# Patient Record
Sex: FEMALE | Race: BLACK OR AFRICAN AMERICAN | Employment: UNEMPLOYED | URBAN - METROPOLITAN AREA
[De-identification: names, ages, dates, MRNs, and addresses within clinical notes are randomized per-mention and may not be internally consistent; named-entity substitution may affect disease eponyms.]

---

## 2018-10-05 ENCOUNTER — HOSPITAL ENCOUNTER (OUTPATIENT)
Age: 33
Setting detail: SPECIMEN
Discharge: HOME | End: 2018-10-05
Payer: COMMERCIAL

## 2018-10-05 DIAGNOSIS — Z11.4 ENCOUNTER FOR SCREENING FOR HIV: ICD-10-CM

## 2018-10-05 DIAGNOSIS — Z13.220 SCREENING CHOLESTEROL LEVEL: ICD-10-CM

## 2018-10-05 DIAGNOSIS — E66.01 CLASS 3 SEVERE OBESITY DUE TO EXCESS CALORIES WITHOUT SERIOUS COMORBIDITY WITH BODY MASS INDEX (BMI) OF 60.0 TO 69.9 IN ADULT (HCC): ICD-10-CM

## 2018-10-05 DIAGNOSIS — Z13.1 ENCOUNTER FOR SCREENING FOR DIABETES MELLITUS: ICD-10-CM

## 2018-10-05 DIAGNOSIS — Z83.3 FAMILY HISTORY OF DIABETES MELLITUS TYPE II: ICD-10-CM

## 2018-10-05 LAB
ABSOLUTE EOS #: <0.03 K/UL (ref 0–0.44)
ABSOLUTE IMMATURE GRANULOCYTE: <0.03 K/UL (ref 0–0.3)
ABSOLUTE LYMPH #: 2.59 K/UL (ref 1.1–3.7)
ABSOLUTE MONO #: 0.8 K/UL (ref 0.1–1.2)
ALBUMIN SERPL-MCNC: 3.8 G/DL (ref 3.5–5.2)
ALBUMIN/GLOBULIN RATIO: 1.1 (ref 1–2.5)
ALP BLD-CCNC: 64 U/L (ref 35–104)
ALT SERPL-CCNC: 14 U/L (ref 5–33)
ANION GAP SERPL CALCULATED.3IONS-SCNC: 11 MMOL/L (ref 9–17)
AST SERPL-CCNC: 11 U/L
BASOPHILS # BLD: 0 % (ref 0–2)
BASOPHILS ABSOLUTE: 0.04 K/UL (ref 0–0.2)
BILIRUB SERPL-MCNC: 0.22 MG/DL (ref 0.3–1.2)
BUN BLDV-MCNC: 7 MG/DL (ref 6–20)
BUN/CREAT BLD: ABNORMAL (ref 9–20)
CALCIUM SERPL-MCNC: 9 MG/DL (ref 8.6–10.4)
CHLORIDE BLD-SCNC: 97 MMOL/L (ref 98–107)
CHOLESTEROL/HDL RATIO: 3.6
CHOLESTEROL: 205 MG/DL
CO2: 27 MMOL/L (ref 20–31)
CREAT SERPL-MCNC: 0.64 MG/DL (ref 0.5–0.9)
DIFFERENTIAL TYPE: ABNORMAL
EOSINOPHILS RELATIVE PERCENT: 0 % (ref 1–4)
ESTIMATED AVERAGE GLUCOSE: 123 MG/DL
GFR AFRICAN AMERICAN: >60 ML/MIN
GFR NON-AFRICAN AMERICAN: >60 ML/MIN
GFR SERPL CREATININE-BSD FRML MDRD: ABNORMAL ML/MIN/{1.73_M2}
GFR SERPL CREATININE-BSD FRML MDRD: ABNORMAL ML/MIN/{1.73_M2}
GLUCOSE BLD-MCNC: 98 MG/DL (ref 70–99)
HBA1C MFR BLD: 5.9 % (ref 4–6)
HCT VFR BLD CALC: 41.3 % (ref 36.3–47.1)
HDLC SERPL-MCNC: 57 MG/DL
HEMOGLOBIN: 12.8 G/DL (ref 11.9–15.1)
HIV AG/AB: NONREACTIVE
IMMATURE GRANULOCYTES: 0 %
LDL CHOLESTEROL: 134 MG/DL (ref 0–130)
LYMPHOCYTES # BLD: 29 % (ref 24–43)
MCH RBC QN AUTO: 26.5 PG (ref 25.2–33.5)
MCHC RBC AUTO-ENTMCNC: 31 G/DL (ref 28.4–34.8)
MCV RBC AUTO: 85.5 FL (ref 82.6–102.9)
MONOCYTES # BLD: 9 % (ref 3–12)
NRBC AUTOMATED: 0 PER 100 WBC
PDW BLD-RTO: 13.1 % (ref 11.8–14.4)
PLATELET # BLD: 366 K/UL (ref 138–453)
PLATELET ESTIMATE: ABNORMAL
PMV BLD AUTO: 11 FL (ref 8.1–13.5)
POTASSIUM SERPL-SCNC: 4.5 MMOL/L (ref 3.7–5.3)
RBC # BLD: 4.83 M/UL (ref 3.95–5.11)
RBC # BLD: ABNORMAL 10*6/UL
SEG NEUTROPHILS: 62 % (ref 36–65)
SEGMENTED NEUTROPHILS ABSOLUTE COUNT: 5.54 K/UL (ref 1.5–8.1)
SODIUM BLD-SCNC: 135 MMOL/L (ref 135–144)
TOTAL PROTEIN: 7.3 G/DL (ref 6.4–8.3)
TRIGL SERPL-MCNC: 69 MG/DL
VLDLC SERPL CALC-MCNC: ABNORMAL MG/DL (ref 1–30)
WBC # BLD: 9 K/UL (ref 3.5–11.3)
WBC # BLD: ABNORMAL 10*3/UL

## 2018-11-20 ENCOUNTER — HOSPITAL ENCOUNTER (OUTPATIENT)
Facility: CLINIC | Age: 33
Discharge: HOME | End: 2018-11-22
Payer: MEDICARE

## 2018-11-20 ENCOUNTER — HOSPITAL ENCOUNTER (OUTPATIENT)
Dept: GENERAL RADIOLOGY | Facility: CLINIC | Age: 33
Discharge: HOME | End: 2018-11-22
Payer: MEDICARE

## 2018-11-20 DIAGNOSIS — R07.1 CHEST PAIN ON BREATHING: ICD-10-CM

## 2018-11-20 DIAGNOSIS — J15.0: ICD-10-CM

## 2018-11-20 PROCEDURE — 71046 X-RAY EXAM CHEST 2 VIEWS: CPT

## 2019-02-06 PROBLEM — E66.01 CLASS 3 SEVERE OBESITY DUE TO EXCESS CALORIES WITHOUT SERIOUS COMORBIDITY WITH BODY MASS INDEX (BMI) OF 60.0 TO 69.9 IN ADULT (HCC): Status: ACTIVE | Noted: 2019-02-06

## 2019-02-06 PROBLEM — E66.813 CLASS 3 SEVERE OBESITY DUE TO EXCESS CALORIES WITHOUT SERIOUS COMORBIDITY WITH BODY MASS INDEX (BMI) OF 60.0 TO 69.9 IN ADULT: Status: ACTIVE | Noted: 2019-02-06

## 2019-02-06 PROBLEM — I10 ESSENTIAL HYPERTENSION: Status: ACTIVE | Noted: 2019-02-06

## 2019-04-15 ENCOUNTER — HOSPITAL ENCOUNTER (OUTPATIENT)
Age: 34
Setting detail: SPECIMEN
Discharge: HOME | End: 2019-04-15
Payer: MEDICARE

## 2019-04-15 ENCOUNTER — OFFICE VISIT (OUTPATIENT)
Dept: OBGYN CLINIC | Age: 34
End: 2019-04-15
Payer: MEDICARE

## 2019-04-15 VITALS
HEART RATE: 91 BPM | BODY MASS INDEX: 51.91 KG/M2 | SYSTOLIC BLOOD PRESSURE: 158 MMHG | DIASTOLIC BLOOD PRESSURE: 106 MMHG | WEIGHT: 293 LBS | HEIGHT: 63 IN

## 2019-04-15 DIAGNOSIS — Z01.419 WOMEN'S ANNUAL ROUTINE GYNECOLOGICAL EXAMINATION: Primary | ICD-10-CM

## 2019-04-15 DIAGNOSIS — E28.9 OVARIAN DYSFUNCTION: ICD-10-CM

## 2019-04-15 PROCEDURE — 99385 PREV VISIT NEW AGE 18-39: CPT | Performed by: OBSTETRICS & GYNECOLOGY

## 2019-04-15 ASSESSMENT — ENCOUNTER SYMPTOMS
COUGH: 0
ABDOMINAL PAIN: 0
SHORTNESS OF BREATH: 0
BACK PAIN: 0

## 2019-04-15 NOTE — PROGRESS NOTES
Tampons/Pads in a day: 4  Last Pap: 3       Any history of abnormal paps no    PriorColpo/Biopsy n/a     Last Mammogram n/a  Contraception: none  Complications: none  STDs: history of chlamydia during pregnancy in 2009  Psychosocial History: Occupation:   Nursing school and nurse aid at [address]   Caffeine Yes, 1 cup coffee    At risk for depression No    Abuse:   No  Seatbelt:   Yes  Exercise:  Rare    Social History     Socioeconomic History    Marital status: Single     Spouse name: Not on file    Number of children: Not on file    Years of education: Not on file    Highest education level: Not on file   Occupational History    Not on file   Social Needs    Financial resource strain: Not on file    Food insecurity:     Worry: Not on file     Inability: Not on file    Transportation needs:     Medical: Not on file     Non-medical: Not on file   Tobacco Use    Smoking status: Never Smoker    Smokeless tobacco: Never Used   Substance and Sexual Activity    Alcohol use: No    Drug use: No    Sexual activity: Not Currently   Lifestyle    Physical activity:     Days per week: Not on file     Minutes per session: Not on file    Stress: Not on file   Relationships    Social connections:     Talks on phone: Not on file     Gets together: Not on file     Attends Latter day service: Not on file     Active member of club or organization: Not on file     Attends meetings of clubs or organizations: Not on file     Relationship status: Not on file    Intimate partner violence:     Fear of current or ex partner: Not on file     Emotionally abused: Not on file     Physically abused: Not on file     Forced sexual activity: Not on file   Other Topics Concern    Not on file   Social History Narrative    ** Merged History Encounter **            Family History   Problem Relation Age of Onset    Heart Disease Mother     Other Mother     Heart Disease Father     Other Father     Depression Mother     Diabetes F. R.O.M. Neurologic Exam: Grossly intact without noted sensorimotor deficits and oriented x 3. Assessment/Plan:   Unremarkable annual Gyn exam.    Cervical Cytology Evaluation begins at 25years old. If Negative Cytology, Follow-up screening per current guidelines. Infertility - labs and u/s ordered. Semen analysis slip given for boyfriend to have done. If all normal will have pt do AMH and possibly HSG. Mammograms every 1year. If 38 yo and last mammogram was negative. Birth control and barrier recommendations discussed. STD counseling and prevention reviewed. Routine health maintenance per patients PCP.   Pt to follow up for annual exam in 1 year     Ab Walkre MD  [address]

## 2019-04-16 LAB
HPV SAMPLE: ABNORMAL
HPV, GENOTYPE 16: NOT DETECTED
HPV, GENOTYPE 18: NOT DETECTED
HPV, HIGH RISK OTHER: DETECTED
HPV, INTERPRETATION: ABNORMAL
SPECIMEN DESCRIPTION: ABNORMAL

## 2019-04-19 ENCOUNTER — HOSPITAL ENCOUNTER (OUTPATIENT)
Age: 34
Discharge: HOME | End: 2019-04-19
Payer: MEDICARE

## 2019-04-19 ENCOUNTER — HOSPITAL ENCOUNTER (OUTPATIENT)
Dept: ULTRASOUND IMAGING | Age: 34
Discharge: HOME | End: 2019-04-21
Payer: MEDICARE

## 2019-04-19 DIAGNOSIS — E28.9 OVARIAN DYSFUNCTION: ICD-10-CM

## 2019-04-19 LAB
HCT VFR BLD CALC: 37.7 % (ref 36–46)
HEMOGLOBIN: 12.4 G/DL (ref 12–16)
MCH RBC QN AUTO: 27 PG (ref 26–34)
MCHC RBC AUTO-ENTMCNC: 32.9 G/DL (ref 31–37)
MCV RBC AUTO: 82 FL (ref 80–100)
NRBC AUTOMATED: NORMAL PER 100 WBC
PDW BLD-RTO: 13.6 % (ref 11.5–14.9)
PLATELET # BLD: 373 K/UL (ref 150–450)
PMV BLD AUTO: 8.7 FL (ref 6–12)
PROGESTERONE LEVEL: 2.85 NG/ML
RBC # BLD: 4.59 M/UL (ref 4–5.2)
TSH SERPL DL<=0.05 MIU/L-ACNC: 2.3 MIU/L (ref 0.3–5)
WBC # BLD: 9.6 K/UL (ref 3.5–11)

## 2019-04-19 PROCEDURE — 85027 COMPLETE CBC AUTOMATED: CPT

## 2019-04-19 PROCEDURE — 84144 ASSAY OF PROGESTERONE: CPT

## 2019-04-19 PROCEDURE — 36415 COLL VENOUS BLD VENIPUNCTURE: CPT

## 2019-04-19 PROCEDURE — 84443 ASSAY THYROID STIM HORMONE: CPT

## 2019-04-19 PROCEDURE — 76856 US EXAM PELVIC COMPLETE: CPT

## 2019-04-22 ENCOUNTER — OFFICE VISIT (OUTPATIENT)
Dept: FAMILY MEDICINE CLINIC | Age: 34
End: 2019-04-22
Payer: MEDICARE

## 2019-04-22 VITALS
SYSTOLIC BLOOD PRESSURE: 167 MMHG | DIASTOLIC BLOOD PRESSURE: 102 MMHG | WEIGHT: 293 LBS | HEART RATE: 91 BPM | HEIGHT: 63 IN | RESPIRATION RATE: 16 BRPM | OXYGEN SATURATION: 98 % | BODY MASS INDEX: 51.91 KG/M2

## 2019-04-22 DIAGNOSIS — I10 ESSENTIAL HYPERTENSION: Primary | ICD-10-CM

## 2019-04-22 DIAGNOSIS — E66.01 CLASS 3 SEVERE OBESITY DUE TO EXCESS CALORIES WITHOUT SERIOUS COMORBIDITY WITH BODY MASS INDEX (BMI) OF 60.0 TO 69.9 IN ADULT (HCC): ICD-10-CM

## 2019-04-22 DIAGNOSIS — B37.9 CANDIDIASIS: ICD-10-CM

## 2019-04-22 PROCEDURE — G8417 CALC BMI ABV UP PARAM F/U: HCPCS | Performed by: FAMILY MEDICINE

## 2019-04-22 PROCEDURE — G8427 DOCREV CUR MEDS BY ELIG CLIN: HCPCS | Performed by: FAMILY MEDICINE

## 2019-04-22 PROCEDURE — 1036F TOBACCO NON-USER: CPT | Performed by: FAMILY MEDICINE

## 2019-04-22 PROCEDURE — 99204 OFFICE O/P NEW MOD 45 MIN: CPT | Performed by: FAMILY MEDICINE

## 2019-04-22 RX ORDER — PHENTERMINE HYDROCHLORIDE 37.5 MG/1
37.5 TABLET ORAL
Qty: 30 TABLET | Refills: 0 | Status: SHIPPED | OUTPATIENT
Start: 2019-04-22 | End: 2019-05-22 | Stop reason: SDUPTHER

## 2019-04-22 RX ORDER — NYSTATIN 100000 U/G
CREAM TOPICAL
Qty: 1 TUBE | Refills: 2 | Status: SHIPPED | OUTPATIENT
Start: 2019-04-22 | End: 2020-10-20

## 2019-04-22 RX ORDER — METOPROLOL SUCCINATE 100 MG/1
100 TABLET, EXTENDED RELEASE ORAL DAILY
Qty: 30 TABLET | Refills: 3 | Status: SHIPPED | OUTPATIENT
Start: 2019-04-22 | End: 2019-08-21 | Stop reason: SDUPTHER

## 2019-04-22 ASSESSMENT — PATIENT HEALTH QUESTIONNAIRE - PHQ9
SUM OF ALL RESPONSES TO PHQ QUESTIONS 1-9: 0
SUM OF ALL RESPONSES TO PHQ9 QUESTIONS 1 & 2: 0
2. FEELING DOWN, DEPRESSED OR HOPELESS: 0
1. LITTLE INTEREST OR PLEASURE IN DOING THINGS: 0
SUM OF ALL RESPONSES TO PHQ QUESTIONS 1-9: 0

## 2019-04-22 NOTE — PROGRESS NOTES
1 tablet by mouth daily 30 tablet 5    albuterol sulfate HFA (PROVENTIL HFA) 108 (90 Base) MCG/ACT inhaler Inhale 2 puffs into the lungs every 6 hours as needed for Wheezing 1 Inhaler 3    clotrimazole-betamethasone (LOTRISONE) 1-0.05 % cream Apply topically 2 times daily. 45 g 0     No current facility-administered medications for this visit. ALLERGIES:    Allergies   Allergen Reactions    Vancomycin Itching       Social History     Tobacco Use    Smoking status: Never Smoker    Smokeless tobacco: Never Used   Substance Use Topics    Alcohol use: No        LDL Cholesterol (mg/dL)   Date Value   10/05/2018 134 (H)     HDL (mg/dL)   Date Value   10/05/2018 57     BUN (mg/dL)   Date Value   10/05/2018 7     CREATININE (mg/dL)   Date Value   10/05/2018 0.64     Glucose (mg/dL)   Date Value   10/05/2018 98     Hemoglobin A1C (%)   Date Value   10/05/2018 5.9              Subjective:      HPI  Here today as a new patient to establish care she's had an ongoing history of hypertension which looks like it's not been very well controlled she is faithful about taking her medications daily  She had recent laboratory studies done which ESSENTIALLY normal other than she's had some elevated cholesterol  She also would like to do something to help with weight loss and wonders if there is any medications that will help with appetite    Is a rash underneath her abdominal fold    Review of Systems:     Constitutional: Negative for fever, appetite change and fatigue. Family social and medical history reviewed and unchanged     HENT: Negative. Negative for nosebleeds, trouble swallowing and neck pain. Eyes: Negative for photophobia and visual disturbance. Respiratory: Negative. Negative for chest tightness and shortness of breath. Cardiovascular: Negative. Negative for chest pain and leg swelling. Gastrointestinal: Negative. Negative for abdominal pain and blood in stool.    Endocrine: Negative for cold intolerance and polyuria. Genitourinary: Negative for dysuria and hematuria. Musculoskeletal: Negative. Skin: Negative for rash. Allergic/Immunologic: Negative. Neurological: Negative. Negative for dizziness, weakness and numbness. Hematological: Negative. Negative for adenopathy. Does not bruise/bleed easily. Psychiatric/Behavioral: Negative for sleep disturbance, dysphoric mood and  decreased concentration. The patient is not nervous/anxious. Objective:     Physical Exam:     Nursing note and vitals reviewed. BP (!) 167/102   Pulse 91   Resp 16   Ht 5' 3\" (1.6 m)   Wt (!) 350 lb (158.8 kg)   LMP 03/26/2019 (Approximate)   SpO2 98%   BMI 62.00 kg/m²   Constitutional: She is oriented to person, place, and time. She   appears well-developed and well-nourished. HENT:   Head: Normocephalic and atraumatic. Right Ear: External ear normal. Tympanic membrane is not erythematous. No middle ear effusion. Left Ear: External ear normal. Tympanic membrane is not erythematous. No middle ear effusion. Nose: No mucosal edema. Mouth/Throat: Oropharynx is clear and moist. No posterior oropharyngeal erythema. Eyes: Conjunctivae and EOM are normal. Pupils are equal, round, and reactive to light. Neck: Normal range of motion. Neck supple. No thyromegaly present. Cardiovascular: Normal rate, regular rhythm and normal heart sounds. No murmur heard. Pulmonary/Chest: Effort normal and breath sounds normal. She has no wheezes. Shehas no rales. Abdominal: Soft. Bowel sounds are normal. She exhibits no distension and no mass. There is no tenderness. There is no rebound and no guarding. erythema under her abdominal apron Genitourinary/Anorectal:deferred  Musculoskeletal: Normal range of motion. She exhibits no edema or tenderness. Lymphadenopathy: She has no cervical adenopathy. Neurological: She is alert and oriented to person, place, and time. She has normal reflexes.    Skin: Skin is warm and dry. No rash noted. Psychiatric: She has a normal mood and affect. Her   behavior is normal.       Assessment:      1. Essential hypertension    2. Class 3 severe obesity due to excess calories without serious comorbidity with body mass index (BMI) of 60.0 to 69.9 in [facility])    3. Candidiasis          Plan:      Call or return to clinic prn if these symptoms worsen or fail to improve as anticipated. I have reviewed the instructions with the patient, answering all questions to her satisfaction. No follow-ups on file. No orders of the defined types were placed in this encounter. Orders Placed This Encounter   Medications    metoprolol succinate (TOPROL XL) 100 MG extended release tablet     Sig: Take 1 tablet by mouth daily     Dispense:  30 tablet     Refill:  3    phentermine (ADIPEX-P) 37.5 MG tablet     Sig: Take 1 tablet by mouth every morning (before breakfast) for 30 days. Dispense:  30 tablet     Refill:  0    nystatin (MYCOSTATIN) 993802 UNIT/GM cream     Sig: Apply topically 2 times daily.      Dispense:  1 Tube     Refill:  2   Discussed current medications stop amlodipine follow-up in the office in one month Electronically signed by Alysia Chavez DO on 4/22/2019 at 3:18 PM

## 2019-04-24 LAB — CYTOLOGY REPORT: NORMAL

## 2019-05-22 ENCOUNTER — OFFICE VISIT (OUTPATIENT)
Dept: FAMILY MEDICINE CLINIC | Age: 34
End: 2019-05-22
Payer: MEDICARE

## 2019-05-22 VITALS
HEIGHT: 63 IN | SYSTOLIC BLOOD PRESSURE: 125 MMHG | OXYGEN SATURATION: 99 % | DIASTOLIC BLOOD PRESSURE: 67 MMHG | HEART RATE: 74 BPM | BODY MASS INDEX: 51.91 KG/M2 | WEIGHT: 293 LBS

## 2019-05-22 DIAGNOSIS — E66.01 CLASS 3 SEVERE OBESITY DUE TO EXCESS CALORIES WITHOUT SERIOUS COMORBIDITY WITH BODY MASS INDEX (BMI) OF 60.0 TO 69.9 IN ADULT (HCC): ICD-10-CM

## 2019-05-22 PROCEDURE — 1036F TOBACCO NON-USER: CPT | Performed by: FAMILY MEDICINE

## 2019-05-22 PROCEDURE — G8417 CALC BMI ABV UP PARAM F/U: HCPCS | Performed by: FAMILY MEDICINE

## 2019-05-22 PROCEDURE — 99213 OFFICE O/P EST LOW 20 MIN: CPT | Performed by: FAMILY MEDICINE

## 2019-05-22 PROCEDURE — G8427 DOCREV CUR MEDS BY ELIG CLIN: HCPCS | Performed by: FAMILY MEDICINE

## 2019-05-22 RX ORDER — PHENTERMINE HYDROCHLORIDE 37.5 MG/1
37.5 TABLET ORAL
Qty: 30 TABLET | Refills: 0 | Status: SHIPPED | OUTPATIENT
Start: 2019-05-22 | End: 2019-06-19 | Stop reason: SDUPTHER

## 2019-05-24 ENCOUNTER — HOSPITAL ENCOUNTER (OUTPATIENT)
Age: 34
Setting detail: SPECIMEN
Discharge: HOME | End: 2019-05-24
Payer: MEDICARE

## 2019-05-24 ENCOUNTER — PROCEDURE VISIT (OUTPATIENT)
Dept: OBGYN CLINIC | Age: 34
End: 2019-05-24
Payer: MEDICARE

## 2019-05-24 VITALS
HEIGHT: 63 IN | WEIGHT: 293 LBS | HEART RATE: 88 BPM | DIASTOLIC BLOOD PRESSURE: 88 MMHG | SYSTOLIC BLOOD PRESSURE: 138 MMHG | BODY MASS INDEX: 51.91 KG/M2

## 2019-05-24 DIAGNOSIS — R87.810 CERVICAL HIGH RISK HPV (HUMAN PAPILLOMAVIRUS) TEST POSITIVE: ICD-10-CM

## 2019-05-24 DIAGNOSIS — R87.612 LGSIL ON PAP SMEAR OF CERVIX: Primary | ICD-10-CM

## 2019-05-24 PROCEDURE — 57454 BX/CURETT OF CERVIX W/SCOPE: CPT | Performed by: OBSTETRICS & GYNECOLOGY

## 2019-05-29 LAB — SURGICAL PATHOLOGY REPORT: NORMAL

## 2019-06-19 ENCOUNTER — OFFICE VISIT (OUTPATIENT)
Dept: FAMILY MEDICINE CLINIC | Age: 34
End: 2019-06-19
Payer: MEDICARE

## 2019-06-19 VITALS
BODY MASS INDEX: 51.91 KG/M2 | HEART RATE: 79 BPM | HEIGHT: 63 IN | SYSTOLIC BLOOD PRESSURE: 114 MMHG | WEIGHT: 293 LBS | DIASTOLIC BLOOD PRESSURE: 78 MMHG

## 2019-06-19 DIAGNOSIS — E66.01 CLASS 3 SEVERE OBESITY DUE TO EXCESS CALORIES WITHOUT SERIOUS COMORBIDITY WITH BODY MASS INDEX (BMI) OF 60.0 TO 69.9 IN ADULT (HCC): ICD-10-CM

## 2019-06-19 PROCEDURE — G8417 CALC BMI ABV UP PARAM F/U: HCPCS | Performed by: FAMILY MEDICINE

## 2019-06-19 PROCEDURE — 1036F TOBACCO NON-USER: CPT | Performed by: FAMILY MEDICINE

## 2019-06-19 PROCEDURE — G8427 DOCREV CUR MEDS BY ELIG CLIN: HCPCS | Performed by: FAMILY MEDICINE

## 2019-06-19 PROCEDURE — 99213 OFFICE O/P EST LOW 20 MIN: CPT | Performed by: FAMILY MEDICINE

## 2019-06-19 RX ORDER — PHENTERMINE HYDROCHLORIDE 37.5 MG/1
37.5 TABLET ORAL
Qty: 30 TABLET | Refills: 0 | Status: SHIPPED | OUTPATIENT
Start: 2019-06-19 | End: 2019-06-21 | Stop reason: SDUPTHER

## 2019-06-19 ASSESSMENT — PATIENT HEALTH QUESTIONNAIRE - PHQ9
SUM OF ALL RESPONSES TO PHQ QUESTIONS 1-9: 0
1. LITTLE INTEREST OR PLEASURE IN DOING THINGS: 0
SUM OF ALL RESPONSES TO PHQ QUESTIONS 1-9: 0
SUM OF ALL RESPONSES TO PHQ9 QUESTIONS 1 & 2: 0
2. FEELING DOWN, DEPRESSED OR HOPELESS: 0

## 2019-06-19 NOTE — PROGRESS NOTES
tablet 5    albuterol sulfate HFA (PROVENTIL HFA) 108 (90 Base) MCG/ACT inhaler Inhale 2 puffs into the lungs every 6 hours as needed for Wheezing 1 Inhaler 3    clotrimazole-betamethasone (LOTRISONE) 1-0.05 % cream Apply topically 2 times daily. 45 g 0     No current facility-administered medications for this visit. ALLERGIES:    Allergies   Allergen Reactions    Vancomycin Itching       Social History     Tobacco Use    Smoking status: Never Smoker    Smokeless tobacco: Never Used   Substance Use Topics    Alcohol use: No        LDL Cholesterol (mg/dL)   Date Value   10/05/2018 134 (H)     HDL (mg/dL)   Date Value   10/05/2018 57     BUN (mg/dL)   Date Value   10/05/2018 7     CREATININE (mg/dL)   Date Value   10/05/2018 0.64     Glucose (mg/dL)   Date Value   10/05/2018 98     Hemoglobin A1C (%)   Date Value   10/05/2018 5.9              Subjective:      HPI  She is here today for weight check she is doing well with lost 4 pounds she is having no complaints or problems    Review of Systems:     Constitutional: Negative for fever, appetite change and fatigue. Family social and medical history reviewed and unchanged     HENT: Negative. Negative for nosebleeds, trouble swallowing and neck pain. Eyes: Negative for photophobia and visual disturbance. Respiratory: Negative. Negative for chest tightness and shortness of breath. Cardiovascular: Negative. Negative for chest pain and leg swelling. Gastrointestinal: Negative. Negative for abdominal pain and blood in stool. Endocrine: Negative for cold intolerance and polyuria. Genitourinary: Negative for dysuria and hematuria. Musculoskeletal: Negative. Skin: Negative for rash. Allergic/Immunologic: Negative. Neurological: Negative. Negative for dizziness, weakness and numbness. Hematological: Negative. Negative for adenopathy. Does not bruise/bleed easily.    Psychiatric/Behavioral: Negative for sleep disturbance, dysphoric mood and  decreased concentration. The patient is not nervous/anxious. Objective:     Physical Exam:     Nursing note and vitals reviewed. /78   Pulse 79   Ht 5' 2.99\" (1.6 m)   Wt (!) 330 lb (149.7 kg)   LMP 05/19/2019   BMI 58.47 kg/m²   Constitutional: She is oriented to person, place, and time. She   appears well-developed and well-nourished. HENT:   Head: Normocephalic and atraumatic. Right Ear: External ear normal. Tympanic membrane is not erythematous. No middle ear effusion. Left Ear: External ear normal. Tympanic membrane is not erythematous. No middle ear effusion. Nose: No mucosal edema. Mouth/Throat: Oropharynx is clear and moist. No posterior oropharyngeal erythema. Eyes: Conjunctivae and EOM are normal. Pupils are equal, round, and reactive to light. Neck: Normal range of motion. Neck supple. No thyromegaly present. Cardiovascular: Normal rate, regular rhythm and normal heart sounds. No murmur heard. Pulmonary/Chest: Effort normal and breath sounds normal. She has no wheezes. Shehas no rales. Abdominal: Soft. Bowel sounds are normal. She exhibits no distension and no mass. There is no tenderness. There is no rebound and no guarding. Genitourinary/Anorectal:deferred  Musculoskeletal: Normal range of motion. She exhibits no edema or tenderness. Lymphadenopathy: She has no cervical adenopathy. Neurological: She is alert and oriented to person, place, and time. She has normal reflexes. Skin: Skin is warm and dry. No rash noted. Psychiatric: She has a normal mood and affect. Her   behavior is normal.       Assessment:      1. Class 3 severe obesity due to excess calories without serious comorbidity with body mass index (BMI) of 60.0 to 69.9 in adult [facility])          Plan:      Call or return to clinic prn if these symptoms worsen or fail to improve as anticipated.   I have reviewed the instructions with the patient, answering all questions to her satisfaction. No follow-ups on file. No orders of the defined types were placed in this encounter. Orders Placed This Encounter   Medications    phentermine (ADIPEX-P) 37.5 MG tablet     Sig: Take 1 tablet by mouth every morning (before breakfast) for 30 days.      Dispense:  30 tablet     Refill:  0     Continue to diet and exercise and follow-up in the office in one month  Electronically signed by Ramu Briceño DO on 6/19/2019 at 1:09 PM

## 2019-06-21 ENCOUNTER — TELEPHONE (OUTPATIENT)
Dept: FAMILY MEDICINE CLINIC | Age: 34
End: 2019-06-21

## 2019-06-21 DIAGNOSIS — E66.01 CLASS 3 SEVERE OBESITY DUE TO EXCESS CALORIES WITHOUT SERIOUS COMORBIDITY WITH BODY MASS INDEX (BMI) OF 60.0 TO 69.9 IN ADULT (HCC): ICD-10-CM

## 2019-06-21 RX ORDER — PHENTERMINE HYDROCHLORIDE 37.5 MG/1
37.5 TABLET ORAL
Qty: 30 TABLET | Refills: 0 | Status: SHIPPED | OUTPATIENT
Start: 2019-06-21 | End: 2019-07-21

## 2019-06-21 NOTE — TELEPHONE ENCOUNTER
Pt requests her adipex script be sent to [facility] due to med being too expensive at [facility].

## 2019-07-17 ENCOUNTER — OFFICE VISIT (OUTPATIENT)
Dept: FAMILY MEDICINE CLINIC | Age: 34
End: 2019-07-17
Payer: MEDICARE

## 2019-07-17 VITALS
OXYGEN SATURATION: 100 % | WEIGHT: 293 LBS | RESPIRATION RATE: 18 BRPM | HEART RATE: 73 BPM | BODY MASS INDEX: 53.92 KG/M2 | HEIGHT: 62 IN | DIASTOLIC BLOOD PRESSURE: 99 MMHG | SYSTOLIC BLOOD PRESSURE: 145 MMHG

## 2019-07-17 DIAGNOSIS — E66.01 OBESITY, MORBID, BMI 50 OR HIGHER (HCC): Primary | ICD-10-CM

## 2019-07-17 PROCEDURE — G8417 CALC BMI ABV UP PARAM F/U: HCPCS | Performed by: FAMILY MEDICINE

## 2019-07-17 PROCEDURE — G8427 DOCREV CUR MEDS BY ELIG CLIN: HCPCS | Performed by: FAMILY MEDICINE

## 2019-07-17 PROCEDURE — 1036F TOBACCO NON-USER: CPT | Performed by: FAMILY MEDICINE

## 2019-07-17 PROCEDURE — 99213 OFFICE O/P EST LOW 20 MIN: CPT | Performed by: FAMILY MEDICINE

## 2019-07-17 ASSESSMENT — ENCOUNTER SYMPTOMS: SHORTNESS OF BREATH: 0

## 2019-07-17 NOTE — PROGRESS NOTES
Baljit Newsome MD  [address] MEDICINE  [id number] [address]  Dept: [phone]    Rosales Mayorga is a 36 y.o. female who presents today for hermedical conditions/complaints as noted below.   Rosales Mayorga is here today c/o 1 Month Follow-Up       HPI:     HPI    Here for weight check, on Adipex x 3 months  Denies tachycardia, headache, palpitations, tremors, insomnia  Has lost 5 lbs since last visit  Going through a lot of stress through school, sometimes emotionally overeats, cut out pop, walking more, doing portion control  Wt Readings from Last 3 Encounters:   19 (!) 325 lb 9.6 oz (147.7 kg)   19 (!) 330 lb (149.7 kg)   19 (!) 334 lb (151.5 kg)     BP Readings from Last 3 Encounters:   19 (!) 145/99   19 114/78   19 138/88       Patient Active Problem List   Diagnosis    Hyperlipidemia    Pre-diabetes    Class 3 severe obesity due to excess calories without serious comorbidity with body mass index (BMI) of 60.0 to 69.9 in [facility])    Essential hypertension       Past Medical History:   Diagnosis Date    Class 3 severe obesity due to excess calories without serious comorbidity with body mass index (BMI) of 60.0 to 69.9 in [facility]) 2019    Essential hypertension 2019    Hyperlipidemia 2016    Hyperlipidemia      Past Surgical History:   Procedure Laterality Date     SECTION       Family History   Problem Relation Age of Onset    Heart Disease Mother     Other Mother     Heart Disease Father     Other Father     Depression Mother     Diabetes Mother     High Blood Pressure Mother     Diabetes Maternal Grandmother     High Blood Pressure Maternal Grandmother     Kidney Disease Maternal Grandmother     Cancer Maternal Grandfather         prostate    Diabetes Maternal Grandfather     Heart Disease Maternal Grandfather     High Blood Pressure

## 2019-07-29 ENCOUNTER — HOSPITAL ENCOUNTER (OUTPATIENT)
Age: 34
Setting detail: SPECIMEN
Discharge: HOME | End: 2019-07-29
Payer: MEDICARE

## 2019-07-29 ENCOUNTER — OFFICE VISIT (OUTPATIENT)
Dept: OBGYN CLINIC | Age: 34
End: 2019-07-29
Payer: MEDICARE

## 2019-07-29 VITALS
HEART RATE: 80 BPM | BODY MASS INDEX: 51.91 KG/M2 | HEIGHT: 63 IN | SYSTOLIC BLOOD PRESSURE: 130 MMHG | WEIGHT: 293 LBS | DIASTOLIC BLOOD PRESSURE: 76 MMHG

## 2019-07-29 DIAGNOSIS — Z20.2 POSSIBLE EXPOSURE TO STD: ICD-10-CM

## 2019-07-29 DIAGNOSIS — N92.0 MENORRHAGIA WITH REGULAR CYCLE: Primary | ICD-10-CM

## 2019-07-29 PROCEDURE — 99213 OFFICE O/P EST LOW 20 MIN: CPT | Performed by: OBSTETRICS & GYNECOLOGY

## 2019-07-29 PROCEDURE — G8417 CALC BMI ABV UP PARAM F/U: HCPCS | Performed by: OBSTETRICS & GYNECOLOGY

## 2019-07-29 PROCEDURE — 1036F TOBACCO NON-USER: CPT | Performed by: OBSTETRICS & GYNECOLOGY

## 2019-07-29 PROCEDURE — G8427 DOCREV CUR MEDS BY ELIG CLIN: HCPCS | Performed by: OBSTETRICS & GYNECOLOGY

## 2019-07-29 ASSESSMENT — ENCOUNTER SYMPTOMS
BACK PAIN: 0
ABDOMINAL PAIN: 0
COUGH: 0
SHORTNESS OF BREATH: 0

## 2019-07-29 NOTE — PROGRESS NOTES
1 tablet by mouth daily 30 tablet 3    nystatin (MYCOSTATIN) 828202 UNIT/GM cream Apply topically 2 times daily. 1 Tube 2    albuterol sulfate HFA (PROVENTIL HFA) 108 (90 Base) MCG/ACT inhaler Inhale 2 puffs into the lungs every 6 hours as needed for Wheezing 1 Inhaler 3    clotrimazole-betamethasone (LOTRISONE) 1-0.05 % cream Apply topically 2 times daily. 45 g 0     No current facility-administered medications for this visit.       Social History     Socioeconomic History    Marital status: Single     Spouse name: Not on file    Number of children: 1    Years of education: Not on file    Highest education level: Not on file   Occupational History    Not on file   Social Needs    Financial resource strain: Not on file    Food insecurity:     Worry: Not on file     Inability: Not on file    Transportation needs:     Medical: Not on file     Non-medical: Not on file   Tobacco Use    Smoking status: Never Smoker    Smokeless tobacco: Never Used   Substance and Sexual Activity    Alcohol use: No    Drug use: No    Sexual activity: Not Currently   Lifestyle    Physical activity:     Days per week: Not on file     Minutes per session: Not on file    Stress: Not on file   Relationships    Social connections:     Talks on phone: Not on file     Gets together: Not on file     Attends Cheondoism service: Not on file     Active member of club or organization: Not on file     Attends meetings of clubs or organizations: Not on file     Relationship status: Not on file    Intimate partner violence:     Fear of current or ex partner: Not on file     Emotionally abused: Not on file     Physically abused: Not on file     Forced sexual activity: Not on file   Other Topics Concern    Not on file   Social History Narrative    ** Merged History Encounter **          Family History   Problem Relation Age of Onset    Heart Disease Mother     Other Mother     Heart Disease Father     Other Father     Depression

## 2019-07-30 LAB
C. TRACHOMATIS DNA ,URINE: NEGATIVE
N. GONORRHOEAE DNA, URINE: NEGATIVE
SOURCE: ABNORMAL
SPECIMEN DESCRIPTION: NORMAL
TRICHOMONAS VAGINALI, MOLECULAR: POSITIVE

## 2019-07-31 ENCOUNTER — TELEPHONE (OUTPATIENT)
Dept: OBGYN CLINIC | Age: 34
End: 2019-07-31

## 2019-07-31 RX ORDER — METRONIDAZOLE 500 MG/1
2000 TABLET ORAL ONCE
Qty: 4 TABLET | Refills: 0 | Status: SHIPPED | OUTPATIENT
Start: 2019-07-31 | End: 2019-07-31

## 2019-08-21 ENCOUNTER — OFFICE VISIT (OUTPATIENT)
Dept: FAMILY MEDICINE CLINIC | Age: 34
End: 2019-08-21
Payer: MEDICARE

## 2019-08-21 VITALS
SYSTOLIC BLOOD PRESSURE: 146 MMHG | OXYGEN SATURATION: 100 % | HEART RATE: 64 BPM | WEIGHT: 293 LBS | DIASTOLIC BLOOD PRESSURE: 99 MMHG | BODY MASS INDEX: 51.91 KG/M2 | HEIGHT: 63 IN

## 2019-08-21 DIAGNOSIS — I10 ESSENTIAL HYPERTENSION: ICD-10-CM

## 2019-08-21 DIAGNOSIS — E66.01 CLASS 3 SEVERE OBESITY DUE TO EXCESS CALORIES WITHOUT SERIOUS COMORBIDITY WITH BODY MASS INDEX (BMI) OF 60.0 TO 69.9 IN ADULT (HCC): Primary | ICD-10-CM

## 2019-08-21 PROCEDURE — G8417 CALC BMI ABV UP PARAM F/U: HCPCS | Performed by: FAMILY MEDICINE

## 2019-08-21 PROCEDURE — 1036F TOBACCO NON-USER: CPT | Performed by: FAMILY MEDICINE

## 2019-08-21 PROCEDURE — G8427 DOCREV CUR MEDS BY ELIG CLIN: HCPCS | Performed by: FAMILY MEDICINE

## 2019-08-21 PROCEDURE — 99213 OFFICE O/P EST LOW 20 MIN: CPT | Performed by: FAMILY MEDICINE

## 2019-08-21 RX ORDER — METOPROLOL SUCCINATE 100 MG/1
TABLET, EXTENDED RELEASE ORAL
Qty: 30 TABLET | Refills: 0 | Status: SHIPPED | OUTPATIENT
Start: 2019-08-21 | End: 2019-09-25 | Stop reason: SDUPTHER

## 2019-08-21 ASSESSMENT — PATIENT HEALTH QUESTIONNAIRE - PHQ9
SUM OF ALL RESPONSES TO PHQ QUESTIONS 1-9: 0
1. LITTLE INTEREST OR PLEASURE IN DOING THINGS: 0
SUM OF ALL RESPONSES TO PHQ QUESTIONS 1-9: 0
2. FEELING DOWN, DEPRESSED OR HOPELESS: 0
SUM OF ALL RESPONSES TO PHQ9 QUESTIONS 1 & 2: 0

## 2019-09-05 ENCOUNTER — TELEPHONE (OUTPATIENT)
Dept: BARIATRICS/WEIGHT MGMT | Age: 34
End: 2019-09-05

## 2019-09-25 DIAGNOSIS — I10 ESSENTIAL HYPERTENSION: ICD-10-CM

## 2019-09-25 RX ORDER — METOPROLOL SUCCINATE 100 MG/1
TABLET, EXTENDED RELEASE ORAL
Qty: 30 TABLET | Refills: 0 | Status: SHIPPED | OUTPATIENT
Start: 2019-09-25 | End: 2019-10-30 | Stop reason: SDUPTHER

## 2019-10-30 ENCOUNTER — OFFICE VISIT (OUTPATIENT)
Dept: FAMILY MEDICINE CLINIC | Age: 34
End: 2019-10-30
Payer: MEDICARE

## 2019-10-30 VITALS
BODY MASS INDEX: 51.91 KG/M2 | HEART RATE: 71 BPM | RESPIRATION RATE: 16 BRPM | OXYGEN SATURATION: 100 % | WEIGHT: 293 LBS | HEIGHT: 63 IN

## 2019-10-30 DIAGNOSIS — I10 ESSENTIAL HYPERTENSION: ICD-10-CM

## 2019-10-30 DIAGNOSIS — R73.03 PRE-DIABETES: Primary | ICD-10-CM

## 2019-10-30 DIAGNOSIS — E66.01 CLASS 3 SEVERE OBESITY DUE TO EXCESS CALORIES WITHOUT SERIOUS COMORBIDITY WITH BODY MASS INDEX (BMI) OF 60.0 TO 69.9 IN ADULT (HCC): ICD-10-CM

## 2019-10-30 DIAGNOSIS — Z23 NEED FOR INFLUENZA VACCINATION: ICD-10-CM

## 2019-10-30 LAB — HBA1C MFR BLD: 5.7 %

## 2019-10-30 PROCEDURE — G8482 FLU IMMUNIZE ORDER/ADMIN: HCPCS | Performed by: FAMILY MEDICINE

## 2019-10-30 PROCEDURE — 90686 IIV4 VACC NO PRSV 0.5 ML IM: CPT | Performed by: FAMILY MEDICINE

## 2019-10-30 PROCEDURE — 90471 IMMUNIZATION ADMIN: CPT | Performed by: FAMILY MEDICINE

## 2019-10-30 PROCEDURE — 99213 OFFICE O/P EST LOW 20 MIN: CPT | Performed by: FAMILY MEDICINE

## 2019-10-30 PROCEDURE — 83036 HEMOGLOBIN GLYCOSYLATED A1C: CPT | Performed by: FAMILY MEDICINE

## 2019-10-30 PROCEDURE — 1036F TOBACCO NON-USER: CPT | Performed by: FAMILY MEDICINE

## 2019-10-30 PROCEDURE — G8417 CALC BMI ABV UP PARAM F/U: HCPCS | Performed by: FAMILY MEDICINE

## 2019-10-30 PROCEDURE — G8427 DOCREV CUR MEDS BY ELIG CLIN: HCPCS | Performed by: FAMILY MEDICINE

## 2019-10-30 RX ORDER — METOPROLOL SUCCINATE 100 MG/1
TABLET, EXTENDED RELEASE ORAL
Qty: 30 TABLET | Refills: 5 | Status: SHIPPED | OUTPATIENT
Start: 2019-10-30 | End: 2020-05-28 | Stop reason: SDUPTHER

## 2019-10-30 RX ORDER — PHENTERMINE HYDROCHLORIDE 37.5 MG/1
37.5 TABLET ORAL
Qty: 30 TABLET | Refills: 0 | Status: SHIPPED | OUTPATIENT
Start: 2019-10-30 | End: 2019-11-29 | Stop reason: SDUPTHER

## 2019-10-30 ASSESSMENT — PATIENT HEALTH QUESTIONNAIRE - PHQ9
2. FEELING DOWN, DEPRESSED OR HOPELESS: 0
1. LITTLE INTEREST OR PLEASURE IN DOING THINGS: 0
SUM OF ALL RESPONSES TO PHQ QUESTIONS 1-9: 0
SUM OF ALL RESPONSES TO PHQ QUESTIONS 1-9: 0
SUM OF ALL RESPONSES TO PHQ9 QUESTIONS 1 & 2: 0

## 2019-11-04 ENCOUNTER — HOSPITAL ENCOUNTER (OUTPATIENT)
Age: 34
Setting detail: SPECIMEN
Discharge: HOME | End: 2019-11-04
Payer: MEDICARE

## 2019-11-04 ENCOUNTER — OFFICE VISIT (OUTPATIENT)
Dept: OBGYN CLINIC | Age: 34
End: 2019-11-04
Payer: MEDICARE

## 2019-11-04 VITALS
DIASTOLIC BLOOD PRESSURE: 89 MMHG | BODY MASS INDEX: 51.91 KG/M2 | SYSTOLIC BLOOD PRESSURE: 139 MMHG | HEIGHT: 63 IN | WEIGHT: 293 LBS | HEART RATE: 71 BPM

## 2019-11-04 DIAGNOSIS — Z86.19 HISTORY OF TRICHOMONAL VAGINITIS: Primary | ICD-10-CM

## 2019-11-04 DIAGNOSIS — Z86.19 HISTORY OF TRICHOMONAL VAGINITIS: ICD-10-CM

## 2019-11-04 DIAGNOSIS — Z11.3 SCREENING EXAMINATION FOR STD (SEXUALLY TRANSMITTED DISEASE): ICD-10-CM

## 2019-11-04 LAB
DIRECT EXAM: ABNORMAL
Lab: ABNORMAL
SPECIMEN DESCRIPTION: ABNORMAL

## 2019-11-04 PROCEDURE — G8417 CALC BMI ABV UP PARAM F/U: HCPCS | Performed by: OBSTETRICS & GYNECOLOGY

## 2019-11-04 PROCEDURE — 1036F TOBACCO NON-USER: CPT | Performed by: OBSTETRICS & GYNECOLOGY

## 2019-11-04 PROCEDURE — G8428 CUR MEDS NOT DOCUMENT: HCPCS | Performed by: OBSTETRICS & GYNECOLOGY

## 2019-11-04 PROCEDURE — G8482 FLU IMMUNIZE ORDER/ADMIN: HCPCS | Performed by: OBSTETRICS & GYNECOLOGY

## 2019-11-04 PROCEDURE — 99213 OFFICE O/P EST LOW 20 MIN: CPT | Performed by: OBSTETRICS & GYNECOLOGY

## 2019-11-04 ASSESSMENT — ENCOUNTER SYMPTOMS
SHORTNESS OF BREATH: 0
BACK PAIN: 0
ABDOMINAL PAIN: 0
COUGH: 0

## 2019-11-05 RX ORDER — METRONIDAZOLE 500 MG/1
500 TABLET ORAL 2 TIMES DAILY
Qty: 14 TABLET | Refills: 0 | Status: SHIPPED | OUTPATIENT
Start: 2019-11-05 | End: 2019-11-12

## 2019-11-29 ENCOUNTER — OFFICE VISIT (OUTPATIENT)
Dept: FAMILY MEDICINE CLINIC | Age: 34
End: 2019-11-29
Payer: MEDICARE

## 2019-11-29 VITALS
HEIGHT: 63 IN | SYSTOLIC BLOOD PRESSURE: 138 MMHG | DIASTOLIC BLOOD PRESSURE: 88 MMHG | RESPIRATION RATE: 16 BRPM | WEIGHT: 293 LBS | HEART RATE: 73 BPM | BODY MASS INDEX: 51.91 KG/M2 | OXYGEN SATURATION: 99 %

## 2019-11-29 DIAGNOSIS — E66.01 CLASS 3 SEVERE OBESITY DUE TO EXCESS CALORIES WITHOUT SERIOUS COMORBIDITY WITH BODY MASS INDEX (BMI) OF 60.0 TO 69.9 IN ADULT (HCC): ICD-10-CM

## 2019-11-29 PROCEDURE — 99213 OFFICE O/P EST LOW 20 MIN: CPT | Performed by: FAMILY MEDICINE

## 2019-11-29 PROCEDURE — G8482 FLU IMMUNIZE ORDER/ADMIN: HCPCS | Performed by: FAMILY MEDICINE

## 2019-11-29 PROCEDURE — G8417 CALC BMI ABV UP PARAM F/U: HCPCS | Performed by: FAMILY MEDICINE

## 2019-11-29 PROCEDURE — G8427 DOCREV CUR MEDS BY ELIG CLIN: HCPCS | Performed by: FAMILY MEDICINE

## 2019-11-29 PROCEDURE — 1036F TOBACCO NON-USER: CPT | Performed by: FAMILY MEDICINE

## 2019-11-29 RX ORDER — FLUCONAZOLE 150 MG/1
150 TABLET ORAL ONCE
Qty: 2 TABLET | Refills: 0 | Status: SHIPPED | OUTPATIENT
Start: 2019-11-29 | End: 2019-11-29

## 2019-11-29 RX ORDER — PHENTERMINE HYDROCHLORIDE 37.5 MG/1
37.5 TABLET ORAL
Qty: 30 TABLET | Refills: 0 | Status: SHIPPED | OUTPATIENT
Start: 2019-11-29 | End: 2020-07-08 | Stop reason: SDUPTHER

## 2020-05-05 ENCOUNTER — TELEMEDICINE (OUTPATIENT)
Dept: FAMILY MEDICINE CLINIC | Age: 35
End: 2020-05-05
Payer: MEDICARE

## 2020-05-05 VITALS — WEIGHT: 293 LBS | BODY MASS INDEX: 63.95 KG/M2

## 2020-05-05 PROCEDURE — 99213 OFFICE O/P EST LOW 20 MIN: CPT | Performed by: FAMILY MEDICINE

## 2020-05-05 PROCEDURE — G8417 CALC BMI ABV UP PARAM F/U: HCPCS | Performed by: FAMILY MEDICINE

## 2020-05-05 PROCEDURE — G8427 DOCREV CUR MEDS BY ELIG CLIN: HCPCS | Performed by: FAMILY MEDICINE

## 2020-05-05 PROCEDURE — 1036F TOBACCO NON-USER: CPT | Performed by: FAMILY MEDICINE

## 2020-05-05 RX ORDER — PHENTERMINE AND TOPIRAMATE 15; 92 MG/1; MG/1
1 CAPSULE, EXTENDED RELEASE ORAL DAILY
Qty: 30 CAPSULE | Refills: 0 | Status: SHIPPED | OUTPATIENT
Start: 2020-05-05 | End: 2020-06-04

## 2020-05-05 ASSESSMENT — PATIENT HEALTH QUESTIONNAIRE - PHQ9
SUM OF ALL RESPONSES TO PHQ9 QUESTIONS 1 & 2: 0
SUM OF ALL RESPONSES TO PHQ QUESTIONS 1-9: 0
SUM OF ALL RESPONSES TO PHQ QUESTIONS 1-9: 0
2. FEELING DOWN, DEPRESSED OR HOPELESS: 0
1. LITTLE INTEREST OR PLEASURE IN DOING THINGS: 0

## 2020-05-28 RX ORDER — METOPROLOL SUCCINATE 100 MG/1
TABLET, EXTENDED RELEASE ORAL
Qty: 30 TABLET | Refills: 5 | Status: SHIPPED | OUTPATIENT
Start: 2020-05-28 | End: 2021-01-20 | Stop reason: SDUPTHER

## 2020-07-08 ENCOUNTER — OFFICE VISIT (OUTPATIENT)
Dept: FAMILY MEDICINE CLINIC | Age: 35
End: 2020-07-08
Payer: MEDICARE

## 2020-07-08 VITALS
OXYGEN SATURATION: 100 % | WEIGHT: 293 LBS | TEMPERATURE: 97.7 F | SYSTOLIC BLOOD PRESSURE: 136 MMHG | BODY MASS INDEX: 53.92 KG/M2 | HEIGHT: 62 IN | DIASTOLIC BLOOD PRESSURE: 93 MMHG | HEART RATE: 68 BPM

## 2020-07-08 PROCEDURE — 1036F TOBACCO NON-USER: CPT | Performed by: FAMILY MEDICINE

## 2020-07-08 PROCEDURE — G8417 CALC BMI ABV UP PARAM F/U: HCPCS | Performed by: FAMILY MEDICINE

## 2020-07-08 PROCEDURE — 99214 OFFICE O/P EST MOD 30 MIN: CPT | Performed by: FAMILY MEDICINE

## 2020-07-08 PROCEDURE — G8427 DOCREV CUR MEDS BY ELIG CLIN: HCPCS | Performed by: FAMILY MEDICINE

## 2020-07-08 RX ORDER — PHENTERMINE HYDROCHLORIDE 37.5 MG/1
37.5 TABLET ORAL
Qty: 30 TABLET | Refills: 0 | Status: SHIPPED | OUTPATIENT
Start: 2020-07-08 | End: 2020-08-04 | Stop reason: SDUPTHER

## 2020-07-08 ASSESSMENT — PATIENT HEALTH QUESTIONNAIRE - PHQ9
2. FEELING DOWN, DEPRESSED OR HOPELESS: 0
SUM OF ALL RESPONSES TO PHQ9 QUESTIONS 1 & 2: 0
1. LITTLE INTEREST OR PLEASURE IN DOING THINGS: 0
SUM OF ALL RESPONSES TO PHQ QUESTIONS 1-9: 0
SUM OF ALL RESPONSES TO PHQ QUESTIONS 1-9: 0

## 2020-07-08 NOTE — PROGRESS NOTES
(90 Base) MCG/ACT inhaler Inhale 2 puffs into the lungs every 6 hours as needed for Wheezing (Patient not taking: Reported on 7/8/2020) 1 Inhaler 3    clotrimazole-betamethasone (LOTRISONE) 1-0.05 % cream Apply topically 2 times daily. (Patient not taking: Reported on 7/8/2020) 45 g 0     No current facility-administered medications for this visit. ALLERGIES:    Allergies   Allergen Reactions    Vancomycin Itching       Social History     Tobacco Use    Smoking status: Never Smoker    Smokeless tobacco: Never Used   Substance Use Topics    Alcohol use: No        LDL Cholesterol (mg/dL)   Date Value   10/05/2018 134 (H)     HDL (mg/dL)   Date Value   10/05/2018 57     BUN (mg/dL)   Date Value   10/05/2018 7     CREATININE (mg/dL)   Date Value   10/05/2018 0.64     Glucose (mg/dL)   Date Value   10/05/2018 98     Hemoglobin A1C (%)   Date Value   10/30/2019 5.7              Subjective:      HPI  she is here today for follow-up on hypertension her numbers are doing fairly well also she would like to get restarted on medication for weight management she is not having any other complaints or problems today she is due for laboratory studies    Review of Systems:     Constitutional: Negative for fever, appetite change and fatigue. Family social and medical history reviewed and unchanged     HENT: Negative. Negative for nosebleeds, trouble swallowing and neck pain. Eyes: Negative for photophobia and visual disturbance. Respiratory: Negative. Negative for chest tightness and shortness of breath. Cardiovascular: Negative. Negative for chest pain and leg swelling. Gastrointestinal: Negative. Negative for abdominal pain and blood in stool. Endocrine: Negative for cold intolerance and polyuria. Genitourinary: Negative for dysuria and hematuria. Musculoskeletal: Negative. Skin: Negative for rash. Allergic/Immunologic: Negative. Neurological: Negative.   Negative for dizziness, weakness and numbness. Hematological: Negative. Negative for adenopathy. Does not bruise/bleed easily. Psychiatric/Behavioral: Negative for sleep disturbance, dysphoric mood and  decreased concentration. The patient is not nervous/anxious. Objective:     Physical Exam:     Nursing note and vitals reviewed. BP (!) 136/93   Pulse 68   Temp 97.7 °F (36.5 °C)   Ht 5' 2\" (1.575 m)   Wt (!) 358 lb 6.4 oz (162.6 kg)   SpO2 100%   BMI 65.55 kg/m²   Constitutional: She is oriented to person, place, and time. She   appears well-developed and well-nourished. HENT:   Head: Normocephalic and atraumatic. Right Ear: External ear normal. Tympanic membrane is not erythematous. No middle ear effusion. Left Ear: External ear normal. Tympanic membrane is not erythematous. No middle ear effusion. Nose: No mucosal edema. Mouth/Throat: Oropharynx is clear and moist. No posterior oropharyngeal erythema. Eyes: Conjunctivae and EOM are normal. Pupils are equal, round, and reactive to light. Neck: Normal range of motion. Neck supple. No thyromegaly present. Cardiovascular: Normal rate, regular rhythm and normal heart sounds. No murmur heard. Pulmonary/Chest: Effort normal and breath sounds normal. She has no wheezes. Shehas no rales. Abdominal: Soft. Bowel sounds are normal. She exhibits no distension and no mass. There is no tenderness. There is no rebound and no guarding. Genitourinary/Anorectal:deferred  Musculoskeletal: Normal range of motion. She exhibits no edema or tenderness. Lymphadenopathy: She has no cervical adenopathy. Neurological: She is alert and oriented to person, place, and time. She has normal reflexes. Skin: Skin is warm and dry. No rash noted. Psychiatric: She has a normal mood and affect. Her   behavior is normal.       Assessment:      1. Essential hypertension    2. Pre-diabetes    3.  Class 3 severe obesity due to excess calories without serious comorbidity with body mass index (BMI) of 60.0 to 69.9 in adult [facility])    4. Obesity, morbid, BMI 50 or higher ([facility] [address]          Plan:      Call or return to clinic prn if these symptoms worsen or fail to improve as anticipated. I have reviewed the instructions with the patient, answering all questions to her satisfaction. No follow-ups on file. Orders Placed This Encounter   Procedures    CBC Auto Differential     Standing Status:   Future     Standing Expiration Date:   1/8/2021    Comprehensive Metabolic Panel     Standing Status:   Future     Standing Expiration Date:   1/8/2021    Lipid Panel     Standing Status:   Future     Standing Expiration Date:   1/8/2021     Order Specific Question:   Is Patient Fasting?/# of Hours     Answer:   yes    T4, Free     Standing Status:   Future     Standing Expiration Date:   1/8/2021    TSH without Reflex     Standing Status:   Future     Standing Expiration Date:   1/8/2021    Thyroid Peroxidase Antibody     Standing Status:   Future     Standing Expiration Date:   1/8/2021    Vitamin D 25 Hydroxy     Standing Status:   Future     Standing Expiration Date:   7/8/2021    Hemoglobin A1C     Standing Status:   Future     Standing Expiration Date:   8/8/2020     Orders Placed This Encounter   Medications    phentermine (ADIPEX-P) 37.5 MG tablet     Sig: Take 1 tablet by mouth every morning (before breakfast) for 30 days.      Dispense:  30 tablet     Refill:  0     May fill July 12 Fu in 1 m   Electronically signed by Alannah Salamanca DO on 7/8/2020 at 8:36 AM

## 2020-08-04 ENCOUNTER — TELEMEDICINE (OUTPATIENT)
Dept: FAMILY MEDICINE CLINIC | Age: 35
End: 2020-08-04
Payer: MEDICARE

## 2020-08-04 PROCEDURE — 1036F TOBACCO NON-USER: CPT | Performed by: FAMILY MEDICINE

## 2020-08-04 PROCEDURE — G8417 CALC BMI ABV UP PARAM F/U: HCPCS | Performed by: FAMILY MEDICINE

## 2020-08-04 PROCEDURE — G8427 DOCREV CUR MEDS BY ELIG CLIN: HCPCS | Performed by: FAMILY MEDICINE

## 2020-08-04 PROCEDURE — 99213 OFFICE O/P EST LOW 20 MIN: CPT | Performed by: FAMILY MEDICINE

## 2020-08-04 RX ORDER — PHENTERMINE HYDROCHLORIDE 37.5 MG/1
37.5 TABLET ORAL
Qty: 30 TABLET | Refills: 0 | Status: SHIPPED | OUTPATIENT
Start: 2020-08-04 | End: 2020-09-03 | Stop reason: SDUPTHER

## 2020-08-04 NOTE — PROGRESS NOTES
Substance Use Topics    Alcohol use: No    Drug use: No   ,   Family History   Problem Relation Age of Onset    Heart Disease Mother     Other Mother     Heart Disease Father     Other Father     Depression Mother     Diabetes Mother     High Blood Pressure Mother     Diabetes Maternal Grandmother     High Blood Pressure Maternal Grandmother     Kidney Disease Maternal Grandmother     Cancer Maternal Grandfather         prostate    Diabetes Maternal Grandfather     Heart Disease Maternal Grandfather     High Blood Pressure Maternal Grandfather     Cancer Paternal Grandmother         breast and brain       PHYSICAL EXAMINATION:  [ INSTRUCTIONS:  \"[x]\" Indicates a positive item  \"[]\" Indicates a negative item  -- DELETE ALL ITEMS NOT EXAMINED]  Vital Signs: (As obtained by patient/caregiver or practitioner observation)    Blood pressure-  Heart rate-    Respiratory rate-    Temperature-  Pulse oximetry-     Constitutional: [x] Appears well-developed and well-nourished [x] No apparent distress      [] Abnormal-   Mental status  [x] Alert and awake  [x] Oriented to person/place/time [x]Able to follow commands      Eyes:  EOM    [x]  Normal  [] Abnormal-  Sclera  [x]  Normal  [] Abnormal -         Discharge [x]  None visible  [] Abnormal -    HENT:   [x] Normocephalic, atraumatic.   [] Abnormal   [x] Mouth/Throat: Mucous membranes are moist.     External Ears [x] Normal  [] Abnormal-     Neck: [x] No visualized mass     Pulmonary/Chest: [x] Respiratory effort normal.  [x] No visualized signs of difficulty breathing or respiratory distress        [] Abnormal-      Musculoskeletal:   [x] Normal gait with no signs of ataxia         [x] Normal range of motion of neck        [] Abnormal-       Neurological:        [x] No Facial Asymmetry (Cranial nerve 7 motor function) (limited exam to video visit)          [x] No gaze palsy        [] Abnormal-         Skin:        [x] No significant exanthematous lesions or discoloration noted on facial skin         [] Abnormal-            Psychiatric:       [x] Normal Affect [x] No Hallucinations        [] Abnormal-     Other pertinent observable physical exam findings-   ASSESSMENT/PLAN:  1. Class 3 severe obesity due to excess calories without serious comorbidity with body mass index (BMI) of 60.0 to 69.9 in adult [facility])      No orders of the defined types were placed in this encounter. Requested Prescriptions     Signed Prescriptions Disp Refills    phentermine (ADIPEX-P) 37.5 MG tablet 30 tablet 0     Sig: Take 1 tablet by mouth every morning (before breakfast) for 30 days. Return in about 4 weeks (around 9/1/2020) for re-check. Jaiden Pugh is a 30 y.o. female being evaluated by a Virtual Visit (video visit) encounter to address concerns as mentioned above. A caregiver was present when appropriate. Due to this being a TeleHealth encounter (During CYUED-93 public health emergency), evaluation of the following organ systems was limited: Vitals/Constitutional/EENT/Resp/CV/GI//MS/Neuro/Skin/Heme-Lymph-Imm. Pursuant to the emergency declaration under the [address] [place] [address], [address] [place] [address] authority and the [email or web address] and [employer] Act, this Virtual Visit was conducted with patient's (and/or legal guardian's) consent, to reduce the patient's risk of exposure to COVID-19 and provide necessary medical care. The patient (and/or legal guardian) has also been advised to contact this office for worsening conditions or problems, and seek emergency medical treatment and/or call 911 if deemed necessary. Patient identification was verified at the start of the visit: Yes    Total time spent on this encounter: Not billed by time    Services were provided through a video synchronous discussion virtually to substitute for in-person clinic visit.  Patient and provider were located at their individual homes.    --Claus Odonnell, DO on 8/4/2020 at 2:51 PM    An electronic signature was used to authenticate this note.

## 2020-08-26 ENCOUNTER — OFFICE VISIT (OUTPATIENT)
Dept: OBGYN CLINIC | Age: 35
End: 2020-08-26
Payer: MEDICARE

## 2020-08-26 ENCOUNTER — HOSPITAL ENCOUNTER (OUTPATIENT)
Age: 35
Setting detail: SPECIMEN
Discharge: HOME | End: 2020-08-26
Payer: MEDICARE

## 2020-08-26 VITALS
BODY MASS INDEX: 51.91 KG/M2 | SYSTOLIC BLOOD PRESSURE: 134 MMHG | WEIGHT: 293 LBS | HEIGHT: 63 IN | DIASTOLIC BLOOD PRESSURE: 78 MMHG | HEART RATE: 76 BPM

## 2020-08-26 PROCEDURE — 99395 PREV VISIT EST AGE 18-39: CPT | Performed by: OBSTETRICS & GYNECOLOGY

## 2020-08-26 ASSESSMENT — ENCOUNTER SYMPTOMS
BACK PAIN: 0
SHORTNESS OF BREATH: 0
ABDOMINAL PAIN: 0
COUGH: 0

## 2020-08-26 NOTE — PROGRESS NOTES
[facility]ICIANS  [facility] - [address] [facility] [address] [facility]  Dept: [phone]    Chief complaint:   Chief Complaint   Patient presents with    Gynecologic Exam     prev pap 2019       History Present Illness: Dave Martinez is a 31 yo female who presents for her annual exam.  She sometimes has an odor that is there for a couple of days and then goes away. She is sexually active and they use condoms. She denies any dyspareunia. She is currently on adipex, and has been taking it for 2 months. She has lost about 10 lbs. She denies any bowel or bladder issues. Current Medications (OTC/Herbal):   Current Outpatient Medications   Medication Sig Dispense Refill    phentermine (ADIPEX-P) 37.5 MG tablet Take 1 tablet by mouth every morning (before breakfast) for 30 days. 30 tablet 0    metoprolol succinate (TOPROL XL) 100 MG extended release tablet TAKE ONE TABLET BY MOUTH DAILY 30 tablet 5    nystatin (MYCOSTATIN) 027853 UNIT/GM cream Apply topically 2 times daily. (Patient not taking: Reported on 2020) 1 Tube 2    albuterol sulfate HFA (PROVENTIL HFA) 108 (90 Base) MCG/ACT inhaler Inhale 2 puffs into the lungs every 6 hours as needed for Wheezing (Patient not taking: Reported on 2020) 1 Inhaler 3    clotrimazole-betamethasone (LOTRISONE) 1-0.05 % cream Apply topically 2 times daily. (Patient not taking: Reported on 2020) 45 g 0     No current facility-administered medications for this visit. Allergies:    Allergies   Allergen Reactions    Vancomycin Itching     Past Medical History:   Past Medical History:   Diagnosis Date    Class 3 severe obesity due to excess calories without serious comorbidity with body mass index (BMI) of 60.0 to 69.9 in adult [facility]) 2019    Essential hypertension 2019    Hyperlipidemia 2016    Hyperlipidemia      Past Surgical History:   Past Surgical History:   Procedure Laterality Date     SECTION Obstetric History:   1  Para 1  Gynecologic History: LMP 21   Menarche 13  Duration 4 d    Interval q  26-28 d  Tampons/Pads in a day: 4-5  Last Pap: 4/15/19       Any history of abnormal paps yes    PriorColpo/Biopsy colp 2019     Last Mammogram n/a  Contraception: condoms  Complications: none  STDs: chlamydia in   Psychosocial History: Occupation:   RN at [employer]   Caffeine Yes    At risk for depression No    Abuse:   No  Seatbelt:   Yes  Exercise:  Rare    Social History     Socioeconomic History    Marital status: Single     Spouse name: Not on file    Number of children: 1    Years of education: Not on file    Highest education level: Not on file   Occupational History    Not on file   Social Needs    Financial resource strain: Not on file    Food insecurity     Worry: Not on file     Inability: Not on file   [employer] needs     Medical: Not on file     Non-medical: Not on file   Tobacco Use    Smoking status: Never Smoker    Smokeless tobacco: Never Used   Substance and Sexual Activity    Alcohol use: No    Drug use: No    Sexual activity: Not Currently   Lifestyle    Physical activity     Days per week: Not on file     Minutes per session: Not on file    Stress: Not on file   Relationships    Social connections     Talks on phone: Not on file     Gets together: Not on file     Attends Zoroastrianism service: Not on file     Active member of club or organization: Not on file     Attends meetings of clubs or organizations: Not on file     Relationship status: Not on file    Intimate partner violence     Fear of current or ex partner: Not on file     Emotionally abused: Not on file     Physically abused: Not on file     Forced sexual activity: Not on file   Other Topics Concern    Not on file   Social History Narrative    ** Merged History Encounter **            Family History   Problem Relation Age of Onset    Heart Disease Mother    Sherrie May Other Mother     Heart Disease Father     Other Father     Depression Mother     Diabetes Mother     High Blood Pressure Mother     Diabetes Maternal Grandmother     High Blood Pressure Maternal Grandmother     Kidney Disease Maternal Grandmother     Cancer Maternal Grandfather         prostate    Diabetes Maternal Grandfather     Heart Disease Maternal Grandfather     High Blood Pressure Maternal Grandfather     Cancer Paternal Grandmother         breast and brain       Review of Systems:   Review of Systems   Constitutional: Negative for chills and fever. HENT: Negative for congestion. Respiratory: Negative for cough and shortness of breath. Cardiovascular: Negative for chest pain and palpitations. Gastrointestinal: Negative for abdominal pain. Genitourinary: Negative for dyspareunia and vaginal discharge. Musculoskeletal: Negative for back pain. Neurological: Negative for dizziness and light-headedness. Psychiatric/Behavioral: The patient is not nervous/anxious. Physical exam:  vitals:  Height   5  ft    3 in,  Weight    352 lbs,   134/78 BP  Gen: alert, no apparent distress  HEENT:No pathologic skin lesions noted,NC/AT,PERRL, normal midline nontender thyroid   Lung Exam: Clear to auscultation in all fields bilaterally, without wheezes,rales or rhonchi. Cardiac Exam: Normal sinus rhythm andrate, without murmurs, rubs or gallops appreciated. Breast Exam: Symmetric without pathological skin changes, nontender without discrete suspicious masses palpated, supraclavicular or axillary adenopathy or nipple discharge noted. Abdominal Exam: Nontender to deep palpation without organomegaly, masses or CVAT appreciated, BS positive. No spinal deformation or tenderness. External Genitalia: Normal development without vulvar,vaginal or cervical lesions noted. Normal vaginal discharge, uterus anterior, 4-6 weeks without CMT. Adnexa nontender without abnormal masses bilaterally.   Rectal Exam: Omitted. Extremities: Nontender without clubbing, cyanosis or edema. F.R.O.M. Neurologic Exam: Grossly intact without noted sensorimotor deficits and oriented x 3. Assessment/Plan:   Unremarkable annual Gyn exam.    Cervical Cytology Evaluation begins at 24years old. If Negative Cytology, Follow-up screening per current guidelines. Mammograms every 1year. If 37 yo and last mammogram was negative. Colonoscopy screening reviewed as well as onset for bone density testing. Birth control and barrier recommendations discussed. STD counseling and prevention reviewed. Routine health maintenance per patients PCP.   Pt to follow up for annual exam in 1 year    Sandee Lyle MD  [address]

## 2020-09-03 ENCOUNTER — TELEMEDICINE (OUTPATIENT)
Dept: FAMILY MEDICINE CLINIC | Age: 35
End: 2020-09-03
Payer: MEDICARE

## 2020-09-03 LAB — CYTOLOGY REPORT: NORMAL

## 2020-09-03 PROCEDURE — G8427 DOCREV CUR MEDS BY ELIG CLIN: HCPCS | Performed by: FAMILY MEDICINE

## 2020-09-03 PROCEDURE — 1036F TOBACCO NON-USER: CPT | Performed by: FAMILY MEDICINE

## 2020-09-03 PROCEDURE — G8417 CALC BMI ABV UP PARAM F/U: HCPCS | Performed by: FAMILY MEDICINE

## 2020-09-03 PROCEDURE — 99213 OFFICE O/P EST LOW 20 MIN: CPT | Performed by: FAMILY MEDICINE

## 2020-09-03 RX ORDER — PHENTERMINE HYDROCHLORIDE 37.5 MG/1
37.5 TABLET ORAL
Qty: 30 TABLET | Refills: 0 | Status: SHIPPED | OUTPATIENT
Start: 2020-09-03 | End: 2020-10-03

## 2020-09-03 ASSESSMENT — PATIENT HEALTH QUESTIONNAIRE - PHQ9
2. FEELING DOWN, DEPRESSED OR HOPELESS: 0
SUM OF ALL RESPONSES TO PHQ QUESTIONS 1-9: 0
1. LITTLE INTEREST OR PLEASURE IN DOING THINGS: 0
SUM OF ALL RESPONSES TO PHQ9 QUESTIONS 1 & 2: 0
SUM OF ALL RESPONSES TO PHQ QUESTIONS 1-9: 0

## 2020-09-03 NOTE — PROGRESS NOTES
9/3/2020    TELEHEALTH EVALUATION -- Audio/Visual (During YQRUJ-59 public health emergency)    HPI:    Mikayla Morejon (:  1985) has requested an audio/video evaluation for the following concern(s):    She is being seen today in virtual visit evaluation for follow-up on weight management she has lost 5 pounds she is having no new complaints or problems today    Review of Systems    Prior to Visit Medications    Medication Sig Taking? Authorizing Provider   phentermine (ADIPEX-P) 37.5 MG tablet Take 1 tablet by mouth every morning (before breakfast) for 30 days. Yes Malini Rodriguez, DO   metoprolol succinate (TOPROL XL) 100 MG extended release tablet TAKE ONE TABLET BY MOUTH DAILY  Malini Rodriguez, DO   nystatin (MYCOSTATIN) 484279 UNIT/GM cream Apply topically 2 times daily. Patient not taking: Reported on 2020  Malini Rodriguez DO   albuterol sulfate HFA (PROVENTIL HFA) 108 (90 Base) MCG/ACT inhaler Inhale 2 puffs into the lungs every 6 hours as needed for Wheezing  Patient not taking: Reported on 2020  Vicki Hernandez PA-C   clotrimazole-betamethasone (LOTRISONE) 1-0.05 % cream Apply topically 2 times daily. Patient not taking: Reported on 2020  Vicki Hernandez PA-C       Social History     Tobacco Use    Smoking status: Never Smoker    Smokeless tobacco: Never Used   Substance Use Topics    Alcohol use: No    Drug use: No        Allergies   Allergen Reactions    Vancomycin Itching   ,   Past Medical History:   Diagnosis Date    Class 3 severe obesity due to excess calories without serious comorbidity with body mass index (BMI) of 60.0 to 69.9 in adult [facility]) 2019    Essential hypertension 2019    Hyperlipidemia 2016    Hyperlipidemia    ,   Past Surgical History:   Procedure Laterality Date     SECTION     ,   Social History     Tobacco Use    Smoking status: Never Smoker    Smokeless tobacco: Never Used   Substance Use Topics    Alcohol use: No    Drug use:  No ,   Family History   Problem Relation Age of Onset    Heart Disease Mother     Other Mother     Heart Disease Father     Other Father     Depression Mother     Diabetes Mother     High Blood Pressure Mother     Diabetes Maternal Grandmother     High Blood Pressure Maternal Grandmother     Kidney Disease Maternal Grandmother     Cancer Maternal Grandfather         prostate    Diabetes Maternal Grandfather     Heart Disease Maternal Grandfather     High Blood Pressure Maternal Grandfather     Cancer Paternal Grandmother         breast and brain       PHYSICAL EXAMINATION:  [ INSTRUCTIONS:  \"[x]\" Indicates a positive item  \"[]\" Indicates a negative item  -- DELETE ALL ITEMS NOT EXAMINED]  Vital Signs: (As obtained by patient/caregiver or practitioner observation)    Blood pressure-  Heart rate-    Respiratory rate-    Temperature-  Pulse oximetry-     Constitutional: [x] Appears well-developed and well-nourished [x] No apparent distress      [] Abnormal-   Mental status  [x] Alert and awake  [x] Oriented to person/place/time [x]Able to follow commands      Eyes:  EOM    [x]  Normal  [] Abnormal-  Sclera  [x]  Normal  [] Abnormal -         Discharge [x]  None visible  [] Abnormal -    HENT:   [x] Normocephalic, atraumatic.   [] Abnormal   [x] Mouth/Throat: Mucous membranes are moist.     External Ears [x] Normal  [] Abnormal-     Neck: [x] No visualized mass     Pulmonary/Chest: [x] Respiratory effort normal.  [x] No visualized signs of difficulty breathing or respiratory distress        [] Abnormal-      Musculoskeletal:   [x] Normal gait with no signs of ataxia         [x] Normal range of motion of neck        [] Abnormal-       Neurological:        [x] No Facial Asymmetry (Cranial nerve 7 motor function) (limited exam to video visit)          [x] No gaze palsy        [] Abnormal-         Skin:        [x] No significant exanthematous lesions or discoloration noted on facial skin         [] Abnormal- Psychiatric:       [x] Normal Affect [x] No Hallucinations        [] Abnormal-     Other pertinent observable physical exam findings-     ASSESSMENT/PLAN:  1. Class 3 severe obesity due to excess calories without serious comorbidity with body mass index (BMI) of 60.0 to 69.9 in adult [facility])      No orders of the defined types were placed in this encounter. Requested Prescriptions     Signed Prescriptions Disp Refills    phentermine (ADIPEX-P) 37.5 MG tablet 30 tablet 0     Sig: Take 1 tablet by mouth every morning (before breakfast) for 30 days. Return in about 4 weeks (around 10/1/2020) for re-check. Dara Issa is a 30 y.o. female being evaluated by a Virtual Visit (video visit) encounter to address concerns as mentioned above. A caregiver was present when appropriate. Due to this being a TeleHealth encounter (During [facility]-30 public health emergency), evaluation of the following organ systems was limited: Vitals/Constitutional/EENT/Resp/CV/GI//MS/Neuro/Skin/Heme-Lymph-Imm. Pursuant to the emergency declaration under the [address] [place] [address], [address] [place] [address] authority and the [employer] and [employer] Act, this Virtual Visit was conducted with patient's (and/or legal guardian's) consent, to reduce the patient's risk of exposure to COVID-19 and provide necessary medical care. The patient (and/or legal guardian) has also been advised to contact this office for worsening conditions or problems, and seek emergency medical treatment and/or call 911 if deemed necessary. Patient identification was verified at the start of the visit: Yes    Total time spent on this encounter: Not billed by time    Services were provided through a video synchronous discussion virtually to substitute for in-person clinic visit. Patient and provider were located at their individual homes.     --Giorgi Pratt DO on 9/3/2020 at 1:59 PM    An

## 2020-10-13 ENCOUNTER — PROCEDURE VISIT (OUTPATIENT)
Dept: OBGYN CLINIC | Age: 35
End: 2020-10-13
Payer: MEDICARE

## 2020-10-13 ENCOUNTER — HOSPITAL ENCOUNTER (OUTPATIENT)
Age: 35
Setting detail: SPECIMEN
Discharge: HOME | End: 2020-10-13
Payer: MEDICARE

## 2020-10-13 VITALS
BODY MASS INDEX: 51.91 KG/M2 | HEART RATE: 74 BPM | SYSTOLIC BLOOD PRESSURE: 144 MMHG | HEIGHT: 63 IN | WEIGHT: 293 LBS | DIASTOLIC BLOOD PRESSURE: 84 MMHG

## 2020-10-13 PROBLEM — R87.810 ASCUS WITH POSITIVE HIGH RISK HPV CERVICAL: Status: ACTIVE | Noted: 2020-10-13

## 2020-10-13 PROBLEM — R87.610 ASCUS WITH POSITIVE HIGH RISK HPV CERVICAL: Status: ACTIVE | Noted: 2020-10-13

## 2020-10-13 PROBLEM — R87.611 ATYPICAL SQUAMOUS CELLS CANNOT EXCLUDE HIGH GRADE SQUAMOUS INTRAEPITHELIAL LESION ON CYTOLOGIC SMEAR OF CERVIX (ASC-H): Status: ACTIVE | Noted: 2020-10-13

## 2020-10-13 LAB
CONTROL: PRESENT
PREGNANCY TEST URINE, POC: NEGATIVE

## 2020-10-13 PROCEDURE — 57454 BX/CURETT OF CERVIX W/SCOPE: CPT | Performed by: OBSTETRICS & GYNECOLOGY

## 2020-10-13 PROCEDURE — 81025 URINE PREGNANCY TEST: CPT | Performed by: OBSTETRICS & GYNECOLOGY

## 2020-10-13 NOTE — PROGRESS NOTES
[facility]  [facility] Shirlene Allen  [address]   Dept: [phone]    COLPOSCOPY PROCEDURE FORM    Chief Complaint:   Chief Complaint   Patient presents with    Procedure     [place] [employer] +HPV         10/13/2020  Beatriz Dhaliwal  Patient's last menstrual period was 2020.  30 y.o.      Past Medical History:   Diagnosis Date    Class 3 severe obesity due to excess calories without serious comorbidity with body mass index (BMI) of 60.0 to 69.9 in adult [facility]) 2019    Essential hypertension 2019    Hyperlipidemia 2016    Hyperlipidemia          Past Surgical History:   Procedure Laterality Date     SECTION         Family History   Problem Relation Age of Onset    Heart Disease Mother     Other Mother     Heart Disease Father     Other Father     Depression Mother     Diabetes Mother     High Blood Pressure Mother     Diabetes Maternal Grandmother     High Blood Pressure Maternal Grandmother     Kidney Disease Maternal Grandmother     Cancer Maternal Grandfather         prostate    Diabetes Maternal Grandfather     Heart Disease Maternal Grandfather     High Blood Pressure Maternal Grandfather     Cancer Paternal Grandmother         breast and brain       Social History     Socioeconomic History    Marital status: Single     Spouse name: Not on file    Number of children: 1    Years of education: Not on file    Highest education level: Not on file   Occupational History    Not on file   Social Needs    Financial resource strain: Not on file    Food insecurity     Worry: Not on file     Inability: Not on file   [employer] needs     Medical: Not on file     Non-medical: Not on file   Tobacco Use    Smoking status: Never Smoker    Smokeless tobacco: Never Used   Substance and Sexual Activity    Alcohol use: No    Drug use: No    Sexual activity: Not Currently   Lifestyle    Physical activity Days per week: Not on file     Minutes per session: Not on file    Stress: Not on file   Relationships    Social connections     Talks on phone: Not on file     Gets together: Not on file     Attends Jainism service: Not on file     Active member of club or organization: Not on file     Attends meetings of clubs or organizations: Not on file     Relationship status: Not on file    Intimate partner violence     Fear of current or ex partner: Not on file     Emotionally abused: Not on file     Physically abused: Not on file     Forced sexual activity: Not on file   Other Topics Concern    Not on file   Social History Narrative    ** Merged History Encounter **            Current Outpatient Medications on File Prior to Visit   Medication Sig Dispense Refill    metoprolol succinate (TOPROL XL) 100 MG extended release tablet TAKE ONE TABLET BY MOUTH DAILY 30 tablet 5    nystatin (MYCOSTATIN) 778735 UNIT/GM cream Apply topically 2 times daily. (Patient not taking: Reported on 7/8/2020) 1 Tube 2    albuterol sulfate HFA (PROVENTIL HFA) 108 (90 Base) MCG/ACT inhaler Inhale 2 puffs into the lungs every 6 hours as needed for Wheezing (Patient not taking: Reported on 7/8/2020) 1 Inhaler 3    clotrimazole-betamethasone (LOTRISONE) 1-0.05 % cream Apply topically 2 times daily. (Patient not taking: Reported on 7/8/2020) 45 g 0     No current facility-administered medications on file prior to visit. Allergies as of 10/13/2020 - Review Complete 10/13/2020   Allergen Reaction Noted    Vancomycin Itching            INDICATIONS:   1.  Atypical squamous cells cannot exclude high grade squamous intraepithelial lesion on cytologic smear of cervix (ASC-H)                   CG: negative         HPV:   positive      Birth Control Method: condoms  Abnormal Cytology and Colposcopy History: LGSIL in 2019, no MIKE on colp in 2019    COLPOSCOPIC EXAMINATION:                Blood pressure (!) 144/84, pulse 74, height 5' 3\" (1.6 m), weight (!) 353 lb 4 oz (160.2 kg), last menstrual period 09/22/2020, not currently breastfeeding. Gross observations: negative  Satisfactory: Yes   Unsatisfactory: No    Physical Exam  Genitourinary:                   ECC performed:  Yes    Lugols Iodine Applied:   No       IMPRESSIONS: MIKE 1-2  Biopsy sites: 6 and 10 o'clock      Assessment:   Diagnosis Orders   1. Atypical squamous cells cannot exclude high grade squamous intraepithelial lesion on cytologic smear of cervix (ASC-H)           PLAN:   1. The patient was given formal restriction guidelines. She is instructed to report any increase in vaginal bleeding, discharge, or any temperature more than 100.4   F. Strict pelvic rest precautions were reviewed with lifting restrictions. Will call pt with results of colposcopy.          Hema Camacho MD

## 2020-10-15 LAB — SURGICAL PATHOLOGY REPORT: NORMAL

## 2020-10-19 ENCOUNTER — NURSE TRIAGE (OUTPATIENT)
Dept: OTHER | Facility: CLINIC | Age: 35
End: 2020-10-19

## 2020-10-20 ENCOUNTER — OFFICE VISIT (OUTPATIENT)
Dept: FAMILY MEDICINE CLINIC | Age: 35
End: 2020-10-20
Payer: MEDICARE

## 2020-10-20 VITALS
BODY MASS INDEX: 51.91 KG/M2 | HEART RATE: 78 BPM | DIASTOLIC BLOOD PRESSURE: 98 MMHG | HEIGHT: 63 IN | OXYGEN SATURATION: 98 % | WEIGHT: 293 LBS | SYSTOLIC BLOOD PRESSURE: 140 MMHG

## 2020-10-20 PROCEDURE — 99213 OFFICE O/P EST LOW 20 MIN: CPT | Performed by: FAMILY MEDICINE

## 2020-10-20 RX ORDER — AMOXICILLIN 500 MG/1
CAPSULE ORAL
Qty: 40 CAPSULE | Refills: 0 | Status: SHIPPED | OUTPATIENT
Start: 2020-10-20 | End: 2021-01-05 | Stop reason: ALTCHOICE

## 2020-10-20 RX ORDER — FLUTICASONE PROPIONATE 50 MCG
1 SPRAY, SUSPENSION (ML) NASAL DAILY
Qty: 1 BOTTLE | Refills: 0 | Status: SHIPPED | OUTPATIENT
Start: 2020-10-20 | End: 2021-12-29

## 2020-10-20 RX ORDER — FEXOFENADINE HYDROCHLORIDE 60 MG/1
60 TABLET, FILM COATED ORAL 2 TIMES DAILY
Qty: 20 TABLET | Refills: 0 | Status: SHIPPED | OUTPATIENT
Start: 2020-10-20 | End: 2020-10-30

## 2020-10-20 ASSESSMENT — ENCOUNTER SYMPTOMS
SHORTNESS OF BREATH: 0
WHEEZING: 0
NAUSEA: 0
SORE THROAT: 0
CHEST TIGHTNESS: 0
COUGH: 0
SINUS PAIN: 0
DIARRHEA: 0
RHINORRHEA: 0

## 2020-10-20 NOTE — PROGRESS NOTES
Subjective:     Patient ID: Danial Bonilla is a 30 y.o. female    Presents with a 10-day history of pain in the left ear associated with some decreased hearing as well as some ringing. She denies any history of sore throat fever cough cold or other symptoms. She has not used any over-the-counter remedies. Medical history includes pretension      Review of Systems   Constitutional: Negative for fatigue and fever. HENT: Positive for ear pain and tinnitus. Negative for congestion, ear discharge, postnasal drip, rhinorrhea, sinus pain, sneezing and sore throat. Eyes: Negative for visual disturbance. Respiratory: Negative for cough, chest tightness, shortness of breath and wheezing. Cardiovascular: Negative for chest pain and palpitations. Gastrointestinal: Negative for diarrhea and nausea. Genitourinary: Negative for dysuria. Skin: Negative for rash. Neurological: Positive for dizziness. Hematological: Negative for adenopathy. Objective:     Physical Exam  Vitals signs and nursing note reviewed. Constitutional:       Appearance: She is obese. She is not ill-appearing. HENT:      Head: Normocephalic. Right Ear: Ear canal and external ear normal. Tenderness present. There is no impacted cerumen. Tympanic membrane is injected and erythematous. Left Ear: Tenderness present. There is no impacted cerumen. Tympanic membrane is injected and erythematous. Ears:      Comments: Right ear exam is normal left ear exam pinna is not red ear canal is somewhat swollen TM shows fluid level with callum pus     Nose: No congestion or rhinorrhea. Mouth/Throat:      Pharynx: No oropharyngeal exudate or posterior oropharyngeal erythema. Neck:      Musculoskeletal: No muscular tenderness. Cardiovascular:      Rate and Rhythm: Normal rate and regular rhythm. Heart sounds: No murmur.    Pulmonary:      Effort: Pulmonary effort is normal.      Breath sounds: Normal breath

## 2021-01-03 ENCOUNTER — PATIENT MESSAGE (OUTPATIENT)
Dept: FAMILY MEDICINE CLINIC | Age: 36
End: 2021-01-03

## 2021-01-04 NOTE — TELEPHONE ENCOUNTER
From: Jong Benavides  To:  Hallie Lee DO  Sent: 1/3/2021 4:31 PM EST  Subject: Non-Urgent Medical Question    I was wondering if I could start on adpiex again I'm not real sure how long it's been since I have been on

## 2021-01-05 ENCOUNTER — PROCEDURE VISIT (OUTPATIENT)
Dept: OBGYN CLINIC | Age: 36
End: 2021-01-05
Payer: MEDICARE

## 2021-01-05 VITALS
SYSTOLIC BLOOD PRESSURE: 151 MMHG | HEIGHT: 63 IN | BODY MASS INDEX: 51.91 KG/M2 | HEART RATE: 68 BPM | DIASTOLIC BLOOD PRESSURE: 93 MMHG | WEIGHT: 293 LBS

## 2021-01-05 DIAGNOSIS — Z30.430 ENCOUNTER FOR IUD INSERTION: ICD-10-CM

## 2021-01-05 DIAGNOSIS — N94.6 DYSMENORRHEA: Primary | ICD-10-CM

## 2021-01-05 PROCEDURE — 58300 INSERT INTRAUTERINE DEVICE: CPT | Performed by: OBSTETRICS & GYNECOLOGY

## 2021-01-05 NOTE — PROGRESS NOTES
[facility] PHYSICIANS  [facility] - [address] [address] [facility]  Dept: [phone]    IUD Insertion Note        Tunde Parra  2021  Patient's last menstrual period was 2021. HPI: The patient is requesting that a Mirena IUD be inserted for Dysmenorrhea. She is known to have painful menses that interfere with her ADL's. She has tried NSAIDS in the past with success. She wishes to continue to utilize barrier contraception for family planning and STD precautions. The patient was counseled on the procedure. Risks, benefits and alternatives were reviewed. The side effect profile of the IUD was reviewed. A consent was reviewed and obtained. The patient was counseled on the need for string checks after her menses and coital activity. She is to notify the office if she can not locate the IUD string at anytime. She is aware that she will need a speculum exam and possibly an ultrasound to confirm the proper orientation of the IUD. She has no other chief complaint today. All other forms of family planning and options for treatment of her dysmennorhea were reviewed with the patient. She is not a smoker. The patient denies any family member or herself as having a blood clot in their leg, lung, brain or that any member of her family has had a sudden cardiac death under the age of 35 yo.     Past Medical History:   Diagnosis Date    Class 3 severe obesity due to excess calories without serious comorbidity with body mass index (BMI) of 60.0 to 69.9 in adult [facility]) 2019    Essential hypertension 2019    Hyperlipidemia 2016    Hyperlipidemia        Past Surgical History:   Procedure Laterality Date     SECTION         Social History     Tobacco Use    Smoking status: Never Smoker    Smokeless tobacco: Never Used   Substance Use Topics    Alcohol use: No    Drug use: No           MEDICATIONS:  Current Outpatient Medications Medication Sig Dispense Refill    metoprolol succinate (TOPROL XL) 100 MG extended release tablet TAKE ONE TABLET BY MOUTH DAILY 30 tablet 5    fluticasone (FLONASE) 50 MCG/ACT nasal spray 1 spray by Each Nostril route daily (Patient not taking: Reported on 1/5/2021) 1 Bottle 0     No current facility-administered medications for this visit. ALLERGIES:  Allergies as of 01/05/2021 - Review Complete 01/05/2021   Allergen Reaction Noted    Vancomycin Itching          Vitals:    01/05/21 1145 01/05/21 1148   BP: (!) 158/95 (!) 151/93   Site: Left Lower Arm Right Lower Arm   Position: Sitting Sitting   Cuff Size: Large Adult Large Adult   Pulse: 74 68   Weight: (!) 364 lb 2 oz (165.2 kg)    Height: 5' 3\" (1.6 m)        Urine pregnancy test: pt currently on menses      Chaperone for Intimate Exam   Chaperone was offered as part of the rooming process. Patient declined and agrees to continue with exam without a chaperone.  Chaperone: Billee Siemens        The patient was positioned comfortably on the exam table. After a bi-manual exam; the uterus was found to be  anteverted. There was no cervical motion tenderness or adnexal masses. The bladder was smooth, non-tender and without palpable masses. A sterile speculum was placed without incident. The site was then cleansed with betadine and the uterus was sounded to 6 cm. The Mirena IUD was opened and loaded into the delivery system. The wand was inserted to just past the internal portio and the button was retracted to the first line. The wand was held in place for 10 seconds and then the button was retracted to its final position while the IUD was moved to the fundus. The string was trimmed in standard fashion. Post procedure restrictions were reviewed and given to the patient. She was instructed to use barrier protection for sexually transmitted disease prevention as well as string checks/timing. The patient tolerated the procedure without difficulty. She was instructed to abstain for two weeks and use sydney-pads for the first 8 weeks. She is to notify the office or go to the nearest Emergency Department if she experiences Abdominal Pain, Temperatures more than 101 F, Odiferous Vaginal Discharge, Dizziness or Shortness of breath. ASSESSMENT:  IUD Insertion   Diagnosis Orders   1. Dysmenorrhea     2. Encounter for IUD insertion       Patient Active Problem List    Diagnosis Date Noted    Atypical squamous cells cannot exclude high grade squamous intraepithelial lesion on cytologic smear of cervix (ASC-H) 10/13/2020     [place] 10/13/20      Class 3 severe obesity due to excess calories without serious comorbidity with body mass index (BMI) of 60.0 to 69.9 in adult [facility]) 02/06/2019    Essential hypertension 02/06/2019    Hyperlipidemia 05/08/2016    Pre-diabetes 05/08/2016     Family Planning    reports that she has never smoked.  She has never used smokeless tobacco.      PLAN:  Family Planning Counseling Completed  Barrier Recommendations  Removal of the Mirena IUD will be in 6 years on 1/5/2027   Annual health follow-up  8/2021  Return to office in 4 wks for IUD check    Jeannette Davis MD

## 2021-01-06 ENCOUNTER — PATIENT MESSAGE (OUTPATIENT)
Dept: FAMILY MEDICINE CLINIC | Age: 36
End: 2021-01-06

## 2021-01-06 NOTE — TELEPHONE ENCOUNTER
From: Myriam Soares  To: Kory Carpio DO  Sent: 1/6/2021 9:12 AM EST  Subject: Non-Urgent Medical Question    Hello I keep getting swelling in legs I'm drinking lots of water and I'm urinating fine but it seems like water is still retaining and thing I can take for that ?

## 2021-01-20 DIAGNOSIS — I10 ESSENTIAL HYPERTENSION: ICD-10-CM

## 2021-01-20 RX ORDER — METOPROLOL SUCCINATE 100 MG/1
TABLET, EXTENDED RELEASE ORAL
Qty: 30 TABLET | Refills: 5 | Status: SHIPPED | OUTPATIENT
Start: 2021-01-20 | End: 2021-03-03 | Stop reason: SDUPTHER

## 2021-01-20 NOTE — TELEPHONE ENCOUNTER
Fannie Loaiza is calling to request a refill on the following medication(s):    Last Visit Date (If Applicable):  2/3/1383    Next Visit Date:    1/20/2021    Medication Request:  Requested Prescriptions     Pending Prescriptions Disp Refills    metoprolol succinate (TOPROL XL) 100 MG extended release tablet 30 tablet 5     Sig: TAKE ONE TABLET BY MOUTH DAILY

## 2021-02-03 ENCOUNTER — OFFICE VISIT (OUTPATIENT)
Dept: OBGYN CLINIC | Age: 36
End: 2021-02-03
Payer: MEDICARE

## 2021-02-03 VITALS
TEMPERATURE: 97.2 F | DIASTOLIC BLOOD PRESSURE: 94 MMHG | BODY MASS INDEX: 65.29 KG/M2 | WEIGHT: 293 LBS | SYSTOLIC BLOOD PRESSURE: 165 MMHG | HEART RATE: 76 BPM

## 2021-02-03 DIAGNOSIS — Z30.431 IUD CHECK UP: Primary | ICD-10-CM

## 2021-02-03 PROCEDURE — G8484 FLU IMMUNIZE NO ADMIN: HCPCS | Performed by: OBSTETRICS & GYNECOLOGY

## 2021-02-03 PROCEDURE — G8427 DOCREV CUR MEDS BY ELIG CLIN: HCPCS | Performed by: OBSTETRICS & GYNECOLOGY

## 2021-02-03 PROCEDURE — G8417 CALC BMI ABV UP PARAM F/U: HCPCS | Performed by: OBSTETRICS & GYNECOLOGY

## 2021-02-03 PROCEDURE — 1036F TOBACCO NON-USER: CPT | Performed by: OBSTETRICS & GYNECOLOGY

## 2021-02-03 PROCEDURE — 99212 OFFICE O/P EST SF 10 MIN: CPT | Performed by: OBSTETRICS & GYNECOLOGY

## 2021-02-03 ASSESSMENT — ENCOUNTER SYMPTOMS
SHORTNESS OF BREATH: 0
ABDOMINAL PAIN: 0
BACK PAIN: 0
COUGH: 0

## 2021-02-03 NOTE — PROGRESS NOTES
[facility] PHYSICIANS  [facility] [address] [facility] [address]  Dept: [phone]  2/3/21    Chief Complaint   Patient presents with    Follow-up     mirena IUD check, no periods       Magali Mann is a 31 yo female whopresents to the office for an IUD check. The Mirena IUD was placed on 1/5/21. She has not any period since the IUD was placed. She denies cramping since it was placed. She is happy with the IUD. She has had intercourse since it was placed, and denies any complications. Review of Systems   Constitutional: Negative for chills and fever. HENT: Negative for congestion. Respiratory: Negative for cough and shortness of breath. Cardiovascular: Negative for chest pain and palpitations. Gastrointestinal: Negative for abdominal pain. Genitourinary: Negative for dyspareunia, pelvic pain and vaginal discharge. Musculoskeletal: Negative for back pain. Neurological: Negative for dizziness and light-headedness. Psychiatric/Behavioral: The patient is not nervous/anxious. Blood pressure (!) 165/94, pulse 76, temperature 97.2 °F (36.2 °C), weight (!) 368 lb 9.6 oz (167.2 kg), not currently breastfeeding. Gen:  Alert, no apparent distress  Pelvic:  Normal appearing vagina and cervix. IUD strings visualized. A/P:   Diagnosis Orders   1.  IUD check up       Discussed continuing barrier protection    Pt to follow up for her BP with her PCP  Pt to follow up for her annual exam in 6 months  Rika Yang, [address] [facility]

## 2021-02-11 ENCOUNTER — TELEMEDICINE (OUTPATIENT)
Dept: FAMILY MEDICINE CLINIC | Age: 36
End: 2021-02-11
Payer: MEDICARE

## 2021-02-11 DIAGNOSIS — I10 ESSENTIAL HYPERTENSION: Primary | ICD-10-CM

## 2021-02-11 PROCEDURE — G8484 FLU IMMUNIZE NO ADMIN: HCPCS | Performed by: FAMILY MEDICINE

## 2021-02-11 PROCEDURE — 1036F TOBACCO NON-USER: CPT | Performed by: FAMILY MEDICINE

## 2021-02-11 PROCEDURE — G8427 DOCREV CUR MEDS BY ELIG CLIN: HCPCS | Performed by: FAMILY MEDICINE

## 2021-02-11 PROCEDURE — 99213 OFFICE O/P EST LOW 20 MIN: CPT | Performed by: FAMILY MEDICINE

## 2021-02-11 PROCEDURE — G8417 CALC BMI ABV UP PARAM F/U: HCPCS | Performed by: FAMILY MEDICINE

## 2021-02-11 RX ORDER — LOSARTAN POTASSIUM AND HYDROCHLOROTHIAZIDE 25; 100 MG/1; MG/1
1 TABLET ORAL DAILY
Qty: 30 TABLET | Refills: 3 | Status: SHIPPED | OUTPATIENT
Start: 2021-02-11 | End: 2021-03-03 | Stop reason: SDUPTHER

## 2021-02-11 ASSESSMENT — PATIENT HEALTH QUESTIONNAIRE - PHQ9: SUM OF ALL RESPONSES TO PHQ QUESTIONS 1-9: 0

## 2021-02-11 NOTE — PROGRESS NOTES
 Diabetes Maternal Grandmother     High Blood Pressure Maternal Grandmother     Kidney Disease Maternal Grandmother     Cancer Maternal Grandfather         prostate    Diabetes Maternal Grandfather     Heart Disease Maternal Grandfather     High Blood Pressure Maternal Grandfather     Cancer Paternal Grandmother         breast and brain       PHYSICAL EXAMINATION:  [ INSTRUCTIONS:  \"[x]\" Indicates a positive item  \"[]\" Indicates a negative item  -- DELETE ALL ITEMS NOT EXAMINED]  Vital Signs: (As obtained by patient/caregiver or practitioner observation)    Blood pressure-  Heart rate-    Respiratory rate-    Temperature-  Pulse oximetry-     Constitutional: [x] Appears well-developed and well-nourished [x] No apparent distress      [] Abnormal-   Mental status  [x] Alert and awake  [x] Oriented to person/place/time [x]Able to follow commands      Eyes:  EOM    [x]  Normal  [] Abnormal-  Sclera  [x]  Normal  [] Abnormal -         Discharge [x]  None visible  [] Abnormal -    HENT:   [x] Normocephalic, atraumatic. [] Abnormal   [x] Mouth/Throat: Mucous membranes are moist.     External Ears [x] Normal  [] Abnormal-     Neck: [x] No visualized mass     Pulmonary/Chest: [x] Respiratory effort normal.  [x] No visualized signs of difficulty breathing or respiratory distress        [] Abnormal-      Musculoskeletal:   [x] Normal gait with no signs of ataxia         [x] Normal range of motion of neck        [] Abnormal-       Neurological:        [x] No Facial Asymmetry (Cranial nerve 7 motor function) (limited exam to video visit)          [x] No gaze palsy        [] Abnormal-         Skin:        [x] No significant exanthematous lesions or discoloration noted on facial skin         [] Abnormal-            Psychiatric:       [x] Normal Affect [x] No Hallucinations        [] Abnormal-     Other pertinent observable physical exam findings-     ASSESSMENT/PLAN:   Diagnosis Orders   1.  Essential hypertension

## 2021-03-03 ENCOUNTER — OFFICE VISIT (OUTPATIENT)
Dept: FAMILY MEDICINE CLINIC | Age: 36
End: 2021-03-03
Payer: MEDICARE

## 2021-03-03 VITALS
OXYGEN SATURATION: 98 % | BODY MASS INDEX: 51.91 KG/M2 | SYSTOLIC BLOOD PRESSURE: 134 MMHG | HEART RATE: 67 BPM | DIASTOLIC BLOOD PRESSURE: 83 MMHG | TEMPERATURE: 97.4 F | WEIGHT: 293 LBS | HEIGHT: 63 IN

## 2021-03-03 DIAGNOSIS — R73.03 PRE-DIABETES: ICD-10-CM

## 2021-03-03 DIAGNOSIS — Z00.00 WELL ADULT EXAM: ICD-10-CM

## 2021-03-03 DIAGNOSIS — E66.01 CLASS 3 SEVERE OBESITY DUE TO EXCESS CALORIES WITHOUT SERIOUS COMORBIDITY WITH BODY MASS INDEX (BMI) OF 60.0 TO 69.9 IN ADULT (HCC): ICD-10-CM

## 2021-03-03 DIAGNOSIS — I10 ESSENTIAL HYPERTENSION: Primary | ICD-10-CM

## 2021-03-03 PROCEDURE — 99214 OFFICE O/P EST MOD 30 MIN: CPT | Performed by: FAMILY MEDICINE

## 2021-03-03 PROCEDURE — G8427 DOCREV CUR MEDS BY ELIG CLIN: HCPCS | Performed by: FAMILY MEDICINE

## 2021-03-03 PROCEDURE — G8417 CALC BMI ABV UP PARAM F/U: HCPCS | Performed by: FAMILY MEDICINE

## 2021-03-03 PROCEDURE — 1036F TOBACCO NON-USER: CPT | Performed by: FAMILY MEDICINE

## 2021-03-03 PROCEDURE — G8484 FLU IMMUNIZE NO ADMIN: HCPCS | Performed by: FAMILY MEDICINE

## 2021-03-03 RX ORDER — METOPROLOL SUCCINATE 100 MG/1
TABLET, EXTENDED RELEASE ORAL
Qty: 90 TABLET | Refills: 1 | Status: SHIPPED | OUTPATIENT
Start: 2021-03-03 | End: 2022-02-15

## 2021-03-03 RX ORDER — LOSARTAN POTASSIUM AND HYDROCHLOROTHIAZIDE 25; 100 MG/1; MG/1
1 TABLET ORAL DAILY
Qty: 90 TABLET | Refills: 1 | Status: SHIPPED | OUTPATIENT
Start: 2021-03-03 | End: 2021-06-13 | Stop reason: SDUPTHER

## 2021-03-03 NOTE — PROGRESS NOTES
[facility]  [address] [place] [address] [address] [place] [address]  Dept: [phone]      Parag Ramon is a 30 y.o. female who presents today for follow up on her  medical conditions as noted below.       Chief Complaint   Patient presents with    Medication Refill       Patient Active Problem List:     Hyperlipidemia     Pre-diabetes     Class 3 severe obesity due to excess calories without serious comorbidity with body mass index (BMI) of 60.0 to 69.9 in adult [facility])     Essential hypertension     Atypical squamous cells cannot exclude high grade squamous intraepithelial lesion on cytologic smear of cervix (ASC-H)     Past Medical History:   Diagnosis Date    Class 3 severe obesity due to excess calories without serious comorbidity with body mass index (BMI) of 60.0 to 69.9 in adult [facility]) 2019    Essential hypertension 2019    Hyperlipidemia 2016    Hyperlipidemia       Past Surgical History:   Procedure Laterality Date     SECTION      INTRAUTERINE DEVICE INSERTION  2021    Mirena [address] [address]-986-24 Lot [id number] Exp 2023     Family History   Problem Relation Age of Onset    Heart Disease Mother     Other Mother     Heart Disease Father     Other Father     Depression Mother     Diabetes Mother     High Blood Pressure Mother     Diabetes Maternal Grandmother     High Blood Pressure Maternal Grandmother     Kidney Disease Maternal Grandmother     Cancer Maternal Grandfather         prostate    Diabetes Maternal Grandfather     Heart Disease Maternal Grandfather     High Blood Pressure Maternal Grandfather     Cancer Paternal Grandmother         breast and brain       Current Outpatient Medications   Medication Sig Dispense Refill    metoprolol succinate (TOPROL XL) 100 MG extended release tablet TAKE ONE TABLET BY MOUTH DAILY 90 tablet 1  losartan-hydroCHLOROthiazide (HYZAAR) 100-25 MG per tablet Take 1 tablet by mouth daily 90 tablet 1    fluticasone (FLONASE) 50 MCG/ACT nasal spray 1 spray by Each Nostril route daily 1 Bottle 0     Current Facility-Administered Medications   Medication Dose Route Frequency Provider Last Rate Last Admin    levonorgestrel (MIRENA) IUD 52 mg 1 each  1 each Intrauterine Once Talya Romero MD   1 each at 01/05/21 1210     ALLERGIES:    Allergies   Allergen Reactions    Vancomycin Itching       Social History     Tobacco Use    Smoking status: Never Smoker    Smokeless tobacco: Never Used   Substance Use Topics    Alcohol use: No        LDL Cholesterol (mg/dL)   Date Value   10/05/2018 134 (H)     HDL (mg/dL)   Date Value   10/05/2018 57     BUN (mg/dL)   Date Value   10/05/2018 7     CREATININE (mg/dL)   Date Value   10/05/2018 0.64     Glucose (mg/dL)   Date Value   10/05/2018 98     Hemoglobin A1C (%)   Date Value   10/30/2019 5.7              Subjective:      HPI  She is here today for follow-up on her high blood pressure is doing quite well today she will be due for laboratory studies she is not really having any new other new problems today    Review of Systems:     Constitutional: Negative for fever, appetite change and fatigue. Family social and medical history reviewed and unchanged     HENT: Negative. Negative for nosebleeds, trouble swallowing and neck pain. Eyes: Negative for photophobia and visual disturbance. Respiratory: Negative. Negative for chest tightness and shortness of breath. Cardiovascular: Negative. Negative for chest pain and leg swelling. Gastrointestinal: Negative. Negative for abdominal pain and blood in stool. Endocrine: Negative for cold intolerance and polyuria. Genitourinary: Negative for dysuria and hematuria. Musculoskeletal: Negative. Skin: Negative for rash. Allergic/Immunologic: Negative. Neurological: Negative. Negative for dizziness, weakness and numbness. Hematological: Negative. Negative for adenopathy. Does not bruise/bleed easily. Psychiatric/Behavioral: Negative for sleep disturbance, dysphoric mood and  decreased concentration. The patient is not nervous/anxious. Objective:     Physical Exam:     Nursing note and vitals reviewed. /83   Pulse 67   Temp 97.4 °F (36.3 °C)   Ht 5' 3\" (1.6 m)   Wt (!) 362 lb (164.2 kg)   SpO2 98%   BMI 64.13 kg/m²   Constitutional: She is oriented to person, place, and time. She   appears well-developed and well-nourished. HENT:   Head: Normocephalic and atraumatic. Right Ear: External ear normal. Tympanic membrane is not erythematous. No middle ear effusion. Left Ear: External ear normal. Tympanic membrane is not erythematous. No middle ear effusion. Nose: No mucosal edema. Mouth/Throat: Oropharynx is clear and moist. No posterior oropharyngeal erythema. Eyes: Conjunctivae and EOM are normal. Pupils are equal, round, and reactive to light. Neck: Normal range of motion. Neck supple. No thyromegaly present. Cardiovascular: Normal rate, regular rhythm and normal heart sounds. No murmur heard. Pulmonary/Chest: Effort normal and breath sounds normal. She has no wheezes. Shehas no rales. Abdominal: Soft. Bowel sounds are normal. She exhibits no distension and no mass. There is no tenderness. There is no rebound and no guarding. Genitourinary/Anorectal:deferred  Musculoskeletal: Normal range of motion. She exhibits no edema or tenderness. Lymphadenopathy: She has no cervical adenopathy. Neurological: She is alert and oriented to person, place, and time. She has normal reflexes. Skin: Skin is warm and dry. No rash noted. Psychiatric: She has a normal mood and affect. Her   behavior is normal.       Assessment:      1. Essential hypertension    2.  Pre-diabetes

## 2021-03-06 ENCOUNTER — HOSPITAL ENCOUNTER (OUTPATIENT)
Age: 36
Discharge: HOME | End: 2021-03-06
Payer: MEDICARE

## 2021-03-06 DIAGNOSIS — Z00.00 WELL ADULT EXAM: ICD-10-CM

## 2021-03-06 DIAGNOSIS — E66.01 CLASS 3 SEVERE OBESITY DUE TO EXCESS CALORIES WITHOUT SERIOUS COMORBIDITY WITH BODY MASS INDEX (BMI) OF 60.0 TO 69.9 IN ADULT (HCC): ICD-10-CM

## 2021-03-06 DIAGNOSIS — I10 ESSENTIAL HYPERTENSION: ICD-10-CM

## 2021-03-06 DIAGNOSIS — R73.03 PRE-DIABETES: ICD-10-CM

## 2021-03-06 LAB
ABSOLUTE EOS #: 0.1 K/UL (ref 0–0.4)
ABSOLUTE IMMATURE GRANULOCYTE: ABNORMAL K/UL (ref 0–0.3)
ABSOLUTE LYMPH #: 2.6 K/UL (ref 1–4.8)
ABSOLUTE MONO #: 0.7 K/UL (ref 0.1–1.3)
ALBUMIN SERPL-MCNC: 3.8 G/DL (ref 3.5–5.2)
ALBUMIN/GLOBULIN RATIO: ABNORMAL (ref 1–2.5)
ALP BLD-CCNC: 74 U/L (ref 35–104)
ALT SERPL-CCNC: 18 U/L (ref 5–33)
ANION GAP SERPL CALCULATED.3IONS-SCNC: 9 MMOL/L (ref 9–17)
AST SERPL-CCNC: 14 U/L
BASOPHILS # BLD: 1 % (ref 0–2)
BASOPHILS ABSOLUTE: 0 K/UL (ref 0–0.2)
BILIRUB SERPL-MCNC: 0.24 MG/DL (ref 0.3–1.2)
BUN BLDV-MCNC: 17 MG/DL (ref 6–20)
BUN/CREAT BLD: ABNORMAL (ref 9–20)
CALCIUM SERPL-MCNC: 8.8 MG/DL (ref 8.6–10.4)
CHLORIDE BLD-SCNC: 101 MMOL/L (ref 98–107)
CHOLESTEROL/HDL RATIO: 4
CHOLESTEROL: 202 MG/DL
CO2: 27 MMOL/L (ref 20–31)
CREAT SERPL-MCNC: 0.6 MG/DL (ref 0.5–0.9)
DIFFERENTIAL TYPE: ABNORMAL
EOSINOPHILS RELATIVE PERCENT: 1 % (ref 0–4)
FOLATE: 7 NG/ML
GFR AFRICAN AMERICAN: >60 ML/MIN
GFR NON-AFRICAN AMERICAN: >60 ML/MIN
GFR SERPL CREATININE-BSD FRML MDRD: ABNORMAL ML/MIN/{1.73_M2}
GFR SERPL CREATININE-BSD FRML MDRD: ABNORMAL ML/MIN/{1.73_M2}
GLUCOSE BLD-MCNC: 110 MG/DL (ref 70–99)
HCT VFR BLD CALC: 37.3 % (ref 36–46)
HDLC SERPL-MCNC: 51 MG/DL
HEMOGLOBIN: 12.3 G/DL (ref 12–16)
IMMATURE GRANULOCYTES: ABNORMAL %
IRON SATURATION: 19 % (ref 20–55)
IRON: 68 UG/DL (ref 37–145)
LDL CHOLESTEROL: 137 MG/DL (ref 0–130)
LYMPHOCYTES # BLD: 30 % (ref 24–44)
MCH RBC QN AUTO: 26.7 PG (ref 26–34)
MCHC RBC AUTO-ENTMCNC: 33 G/DL (ref 31–37)
MCV RBC AUTO: 81.1 FL (ref 80–100)
MONOCYTES # BLD: 8 % (ref 1–7)
NRBC AUTOMATED: ABNORMAL PER 100 WBC
PDW BLD-RTO: 13.8 % (ref 11.5–14.9)
PLATELET # BLD: 381 K/UL (ref 150–450)
PLATELET ESTIMATE: ABNORMAL
PMV BLD AUTO: 8.5 FL (ref 6–12)
POTASSIUM SERPL-SCNC: 4 MMOL/L (ref 3.7–5.3)
RBC # BLD: 4.6 M/UL (ref 4–5.2)
RBC # BLD: ABNORMAL 10*6/UL
SEG NEUTROPHILS: 60 % (ref 36–66)
SEGMENTED NEUTROPHILS ABSOLUTE COUNT: 5.3 K/UL (ref 1.3–9.1)
SODIUM BLD-SCNC: 137 MMOL/L (ref 135–144)
THYROXINE, FREE: 1.02 NG/DL (ref 0.93–1.7)
TOTAL IRON BINDING CAPACITY: 349 UG/DL (ref 250–450)
TOTAL PROTEIN: 7.4 G/DL (ref 6.4–8.3)
TRIGL SERPL-MCNC: 71 MG/DL
TSH SERPL DL<=0.05 MIU/L-ACNC: 2.28 MIU/L (ref 0.3–5)
UNSATURATED IRON BINDING CAPACITY: 281 UG/DL (ref 112–347)
VITAMIN B-12: 426 PG/ML (ref 232–1245)
VITAMIN D 25-HYDROXY: 14.1 NG/ML (ref 30–100)
VLDLC SERPL CALC-MCNC: ABNORMAL MG/DL (ref 1–30)
WBC # BLD: 8.8 K/UL (ref 3.5–11)
WBC # BLD: ABNORMAL 10*3/UL

## 2021-03-06 PROCEDURE — 36415 COLL VENOUS BLD VENIPUNCTURE: CPT

## 2021-03-06 PROCEDURE — 83550 IRON BINDING TEST: CPT

## 2021-03-06 PROCEDURE — 84443 ASSAY THYROID STIM HORMONE: CPT

## 2021-03-06 PROCEDURE — 84439 ASSAY OF FREE THYROXINE: CPT

## 2021-03-06 PROCEDURE — 82746 ASSAY OF FOLIC ACID SERUM: CPT

## 2021-03-06 PROCEDURE — 83036 HEMOGLOBIN GLYCOSYLATED A1C: CPT

## 2021-03-06 PROCEDURE — 82607 VITAMIN B-12: CPT

## 2021-03-06 PROCEDURE — 80053 COMPREHEN METABOLIC PANEL: CPT

## 2021-03-06 PROCEDURE — 80061 LIPID PANEL: CPT

## 2021-03-06 PROCEDURE — 83540 ASSAY OF IRON: CPT

## 2021-03-06 PROCEDURE — 82306 VITAMIN D 25 HYDROXY: CPT

## 2021-03-06 PROCEDURE — 85025 COMPLETE CBC W/AUTO DIFF WBC: CPT

## 2021-03-07 LAB
ESTIMATED AVERAGE GLUCOSE: 131 MG/DL
HBA1C MFR BLD: 6.2 % (ref 4–6)

## 2021-04-02 ENCOUNTER — PATIENT MESSAGE (OUTPATIENT)
Dept: FAMILY MEDICINE CLINIC | Age: 36
End: 2021-04-02

## 2021-04-02 DIAGNOSIS — E66.01 CLASS 3 SEVERE OBESITY DUE TO EXCESS CALORIES WITHOUT SERIOUS COMORBIDITY WITH BODY MASS INDEX (BMI) OF 60.0 TO 69.9 IN ADULT (HCC): Primary | ICD-10-CM

## 2021-04-02 NOTE — TELEPHONE ENCOUNTER
From: Snow Warner  To:  Marlena Lopez DO  Sent: 4/2/2021 11:09 AM EDT  Subject: Non-Urgent Medical Question    I wanted to discuss a referral so I can look into the weight loss surgery and which one would be right for me I have [employer] insurance which is state insurance I know it's some requirements but I have tired to do this on my own and it's not working so I'm look into surgery now

## 2021-04-28 ENCOUNTER — PATIENT MESSAGE (OUTPATIENT)
Dept: FAMILY MEDICINE CLINIC | Age: 36
End: 2021-04-28

## 2021-04-28 ENCOUNTER — OFFICE VISIT (OUTPATIENT)
Dept: BARIATRICS/WEIGHT MGMT | Age: 36
End: 2021-04-28
Payer: MEDICARE

## 2021-04-28 VITALS
SYSTOLIC BLOOD PRESSURE: 120 MMHG | RESPIRATION RATE: 20 BRPM | DIASTOLIC BLOOD PRESSURE: 82 MMHG | BODY MASS INDEX: 51.91 KG/M2 | HEIGHT: 63 IN | WEIGHT: 293 LBS | HEART RATE: 76 BPM

## 2021-04-28 DIAGNOSIS — E66.01 MORBID OBESITY WITH BMI OF 60.0-69.9, ADULT (HCC): ICD-10-CM

## 2021-04-28 DIAGNOSIS — R06.83 SNORING: ICD-10-CM

## 2021-04-28 DIAGNOSIS — R06.09 DYSPNEA ON EXERTION: Primary | ICD-10-CM

## 2021-04-28 DIAGNOSIS — R13.19 ESOPHAGEAL DYSPHAGIA: ICD-10-CM

## 2021-04-28 PROCEDURE — G8427 DOCREV CUR MEDS BY ELIG CLIN: HCPCS | Performed by: SURGERY

## 2021-04-28 PROCEDURE — 99204 OFFICE O/P NEW MOD 45 MIN: CPT | Performed by: SURGERY

## 2021-04-28 PROCEDURE — G8417 CALC BMI ABV UP PARAM F/U: HCPCS | Performed by: SURGERY

## 2021-04-28 PROCEDURE — 1036F TOBACCO NON-USER: CPT | Performed by: SURGERY

## 2021-04-28 NOTE — PROGRESS NOTES
[facility]  [facility]RIC [facility]G  [address] [address]  Dept: [phone]    SURGICAL WEIGHT MANAGEMENT PROGRAM  PROGRESS NOTE INITIAL EVALUATION     Patient: Sadi Carballo        Service Date: 2021     HPI:     Chief Complaint   Patient presents with    Bariatric, Initial Visit    Weight Loss       The patient is a pleasant 30y.o. year old female  with morbid obesity, who stands Height: 5' 3\" (160 cm) tall with a weight of Weight: (!) 372 lb (168.7 kg) , resulting in a BMI of Body mass index is 65.9 kg/m². . The patient suffers from multiple co-morbidities as a result of morbid obesity, including: Hyperlipidemia and Dyspnea on Exertion. She has suffered from obesity for many years. She complains of frequent dysphagia    The patient denies  a history of myocardial infarction, deep vein thrombosis, pulmonary embolism, renal failure, hepatic failure and stroke. The patient has failed multiple attempts at non-surgical weight loss, and is now seeking surgical intervention to promote permanent and consistent weight loss. She  has chosen Michael-en-Y Gastric Bypass and Sleeve Gastrectomy. She is well educated regarding it, as she has recently viewed our weight loss surgery informational seminar .      Medical History:  Past Medical History:   Diagnosis Date    Class 3 severe obesity due to excess calories without serious comorbidity with body mass index (BMI) of 60.0 to 69.9 in adult [facility]) 2019    Essential hypertension 2019    Hyperlipidemia 2016    Hyperlipidemia        Surgical History:  Past Surgical History:   Procedure Laterality Date     SECTION      INTRAUTERINE DEVICE INSERTION  2021    [address] [id number] Lot [id number] Exp 2023       Family History:      Problem Relation Age of Onset    Heart Disease Mother     Other Mother     Heart Disease Father     Other Father     Depression Mother     Diabetes Mother  High Blood Pressure Mother     Diabetes Maternal Grandmother     High Blood Pressure Maternal Grandmother     Kidney Disease Maternal Grandmother     Cancer Maternal Grandfather         prostate    Diabetes Maternal Grandfather     Heart Disease Maternal Grandfather     High Blood Pressure Maternal Grandfather     Cancer Paternal Grandmother         breast and brain       Social History:   Social History     Tobacco Use    Smoking status: Never Smoker    Smokeless tobacco: Never Used   Substance Use Topics    Alcohol use: No    Drug use: No       Current Med List:  Current Outpatient Medications   Medication Sig Dispense Refill    metoprolol succinate (TOPROL XL) 100 MG extended release tablet TAKE ONE TABLET BY MOUTH DAILY 90 tablet 1    losartan-hydroCHLOROthiazide (HYZAAR) 100-25 MG per tablet Take 1 tablet by mouth daily 90 tablet 1    fluticasone (FLONASE) 50 MCG/ACT nasal spray 1 spray by Each Nostril route daily 1 Bottle 0     Current Facility-Administered Medications   Medication Dose Route Frequency Provider Last Rate Last Admin    levonorgestrel (MIRENA) IUD 52 mg 1 each  1 each Intrauterine Once Cassiebiarden Carroll MD   1 each at 01/05/21 1210        Allergies   Allergen Reactions    Vancomycin Itching       SOCIAL:      This patient is alone for the evaluation today.       [] HIV Risk Factors (i.e.) intravenous drug abuser; at risk sexual behavior; received blood products    [] TB Risk Factors (i.e.) Medically underserved, institutional care, foreign born, endemic area; exposure to active case    [] Hepatitis B&C Risk Factors (i.e.) Received blood transfusion prior to 1992; recreational drug use; high risk sexual behaviors; tattoos or body piercings; contact with blood or needle sticks in the workplace    Comprehension    Ability to grasp concepts and respond to questions:   [x] High   [] Medium   [] Low    Motivation    [x] Asks Questions; eager to learn   [] Needs education   [] Extreme anxiety    [] uncooperative   [] Denies need for education    English Speaking Ability    [x] Speaks English well   [x] Reads English well   [x] Understands spoken english    [x] Understands written English   [] No need for interpretive support      [] Might benefit from interpretive support   []  required for all services     REVIEW OF SYSTEMS: (Negative unless marked otherwise)       Do you or have you had any of the following?   Cardiovascular YES NO Respiratory YES NO   High Blood Pressure   [x]   [] COPD   []   []   Heart Attack   []   [] TB/Positive skin Test   []   []   Congestive Heart Failure   []   [] Obstructive Sleep Apnea   []   []   Coronary Artery Disease   []   [] Asthma   []   [x]   Circulation Problems   []   []      Activity Intolerance   []   [] Gastrointestinal YES NO   Peripheral Vascular Disease   []   [] Gastric Problems   []   []        Colorectal problems   []   []   Hematological YES NO Ulcer disease   []   []   Bleeding Tendencies   []   [] Liver disease   []   []   Blood Transfusion last 30d   []   [] Gallstones   []   []   Anemia   []   [] Refulx or Heartburn   []   []   Blood Clots   []   []      High Cholesterol   []   [] Muscoloskeletal YES NO   High Triglycerides   []   [] Joint Limitations   []   []      Muscle Weakness   []   []   Eyes, Ears, Nose, Throat YES NO Multiple Sclerosis   []   []   Cataracts   []   [] Arthritis   []   []   Glasses   [x]   []      Blurred Vision   []   [] Cancer   []   []   Hearing Aids   []   [] Type:     Ringing in Ears   []   []      Difficulty Swallowing   []   [] Encodrine YES NO      Diabetes   []   []   Neurological YES NO Thyroid   []   []   Stroke   []   []      Seizure   []   [] Psychiatric Disorder YES NO   Dizziness/Blackouts/Fainting   []   [] Depression   []   []   Memory Impairement   []   [] Bipolar   []   []   Parkinson's   []   [] Anxiety disorder   []   []           Genitourinary/Gyn YES NO Skin Intact   [x]   [] Urinary Infection   [x]   []      Stones   []   [] Sleep   YES NO   Kidney Disease   []   [] Excessive daytime sleepiness   [x]   []   Incontinent   []   [] Snoring   [x]   []   Irregular menstrual cycles   []   [] Unrefreshed sleep   [x]   []   Possibly Pregnant? []     [] Other:      Date of LMP:   Preferred location:            PRESENT ILLNESS:     Weight Parameters  Weight (!) 372 lb (168.7 kg)   Height 5' 3\" (1.6 m)   BMI Body mass index is 65.9 kg/m². IBW     EBW               IMMUNIZATION STATUS  Immunization History   Administered Date(s) Administered    Influenza Vaccine, unspecified formulation 09/28/2018    Influenza Virus Vaccine 09/28/2018    Influenza, [place], IM, PF (6 mo and older Fluzone, Flulaval, Fluarix, and 3 yrs and older Afluria) 09/26/2018, 10/30/2019    MMR 09/15/1999, 04/26/2018    PPD Test 01/29/2019    Td vaccine (adult) 09/15/1999    Tdap (Boostrix, Adacel) 04/26/2018       FALLS ASSESSMENT    [x] LOW RISK FOR FALLS    [] MODERATE RISK FOR FALLS    [] Difficulty walking/selfcare    [] Falls in the past 2 months    [] Suspicion of Clinician    [] Other:      SMOKING CESSATION     [x] Not needed     [] Instructed to stop smoking    [] Pamphlet community resources given     VTE SCREEN    [] Family hx DVT/PE  /   [] Personal hx of DVT/PE    [x] Denies any family or personal hx of DVT/PE    Physician Review    [x] Past medical, family, & social history reviewed and discussed with patient.      Review of surgery and post-surgical changes (by surgeon for surgical patients only)    [x] Lifelong diet expectations reviewed with patient    [x] Need for lifelong vitamin supplementation reviewed with patient    PHYSICAL EXAMINATION:      /82 (Site: Right Upper Arm, Position: Sitting, Cuff Size: Large Adult)   Pulse 76   Resp 20   Ht 5' 3\" (1.6 m)   Wt (!) 372 lb (168.7 kg)   BMI 65.90 kg/m²     Constitutional:  Vital signs are normal. The patient appears well-developed   HEENT: Head: Normocephalic. Atraumatic     Eyes: pupils are equal and reactive. No scleral icterus is present. Neck: No mass and no thyromegaly present. Cardiovascular: Normal rate, regular rhythm, S1 normal and S2 normal.  Bilateral pulses present. Pulmonary/Chest: Effort normal and breath sounds normal. No retractions. Abdominal: Soft. Normal appearance. There is no organomegaly. No tenderness. There is no rigidity, no rebound, no guarding and no Ortega's sign. Musculoskeletal:      Right lower leg: Normal. No tenderness and no edema. Left lower leg: Normal. No tenderness and no edema. Lymphadenopathy:     No cervical adenopathy, No Exrtemity Adenopathy. Neurological: The patient is alert and oriented. Moving all four extremities equally, sensation grossly intact bilateral.  Skin: Skin is warm, dry and intact. Psychiatric: The patient has a normal mood and affect. Speech is normal and behavior is normal. Judgment and thought content normal. Cognition and memory are normal.     RECOMMENDATIONS:     We spent a great deal of time discussing the risks and benefits of Michael-en-Y Gastric Bypass and Sleeve Gastrectomy, including but not limited to injury to intra-abdominal organs, breakdown of the gastric staple line, the need for re-operative therapy,  prolonged hospitalization,  mechanical ventilation,  and death. We discussed the possibility of bleeding, the need for blood transfusions, blood clots, hospital-acquired and intra-abdominal infection, anastomotic stricture, and worsening GERD. And we discussed the need for post-operative visit compliance, behavior modifications and diet changes, protein and vitamin supplementation, as well as routine scheduled and dedicated exercise. I instructed the patient to utilize the exercise log that will be given to them at their fist dietician appointment.   We discussed the potential weight loss benefit of approximately 60-70% of her excess body weight at 12-18

## 2021-05-05 ENCOUNTER — NURSE ONLY (OUTPATIENT)
Dept: BARIATRICS/WEIGHT MGMT | Age: 36
End: 2021-05-05

## 2021-05-05 VITALS — BODY MASS INDEX: 66.07 KG/M2 | WEIGHT: 293 LBS

## 2021-05-05 NOTE — PROGRESS NOTES
Medical Nutrition Therapy  Initial Nutrition Assessment for Metabolic/ Bariatric Surgery  Required insurance visit prior to surgery:  3  Shared with patient the importance of documenting exercise and staying at or below start weight during visits. Pt reports:     Veronique Quach is a 30 y.o. female with a date of birth of 1985. There were no vitals filed for this visit. BMI: Body mass index is 66.07 kg/m². Obesity Classification: Class III    Weight History: Wt Readings from Last 3 Encounters:   05/05/21 (!) 373 lb (169.2 kg)   04/28/21 (!) 372 lb (168.7 kg)   03/03/21 (!) 362 lb (164.2 kg)        How does your weight affect your daily activities? Short of breath a lot      What would be different in your life if you felt healthier and fit? Why is that important to you now? Nothing works to [employer]; only gaining or regaining  Do you drink alcohol? . NO    Do you use tobacco in the form of cigarettes, cigars, chew or any vapor appliance? No    Weight History      Russ Shultz's highest adult weight was 380 lbs at age . Patient was at her highest weight for  . Patient's triggers/known causes to her highest weight are . Darrel Shultz's lowest adult weight was 320 lbs at age . Patient was at her lowest weight for  . The lowest weight was achieved through  . Physical Activity  Do you participate in a structured exercise program, step counting or regular physical activity? yes      Instructions and exercise logs were provided to patient today see goal sheet and plan. Previous weight loss attempts  Patient has participated in the following weight loss programs:   [employer], , Austin Trejo      Nutrition History  Have you ever been diagnosed with an eating disorder? No  Have you ever had problems tolerating a multivitamin or mineral supplement?no  Have you ever been diagnosed with a vitamin or mineral deficiency?  no   P Patient  have Mandaen/cultural food concerns. Patient dines out to a sit down restaurant 2 times per week. Patient dines out to a fast food restaurant 4 times per week. Patient does have grazing. Patient does not have night eating. Patient does have a history of emotional eating. Patient does have a history of  eating out of boredom. Drinks throughout the day: water and coffee    24 hour recall/food frequency: has been scanned into chart unless completed below. Surgery  Patient's greatest concern about having surgery is: death and losing too much weight. How will you know when you've been successful? Patient has attended an information session where the long term changes of MBS were presented. Rating on a scale of 0-10 with 0 being none and 10 being the highest you have ever felt rate each of the three areas:    ability How confident are you that you can change now?    willingness How important is it for you to change at this time? Readiness How ready are you to change at this time? Assessment:  Nutritional Needs:  Men: 1500kcal daily minimum     Women 1200kcal daily minimum. 60-80gm of protein daily  PES Statement:  Obesity related to a complex combination of decreased energy needs, disordered eating patterns, physical inactivity, and increased psychological/life stress as evidenced by BMI Body mass index is 66.07 kg/m². and inability to maintain a significant amount of weight loss through conventional weight loss interventions. Goals    All goals were planned with and agreed on by the patient. I want to improve my health because Live a long and healthy life. appt # NA G What is your next step? C 1 2 3 4 5 6 7 8 9     0  one I will read the education binder provided to me and the                   2 I will make my pschological evaluation appoinment. x              0  3 I will bring this goal card to every appointment.                 x 4 I will eliminate all Deuce

## 2021-05-19 ENCOUNTER — PATIENT MESSAGE (OUTPATIENT)
Dept: FAMILY MEDICINE CLINIC | Age: 36
End: 2021-05-19

## 2021-05-19 NOTE — TELEPHONE ENCOUNTER
From: Tadeo Ruiz  To:  Christopher Zaidi DO  Sent: 5/19/2021 1:43 PM EDT  Subject: Non-Urgent Medical Question    Hello for the past week I have been having light headed dizzy spells periodically through out the days I have changed anything that I have been doing it happens around mid morning I'm not sure what could be going on I check my b/p when this happens and it's within normal limits for me

## 2021-05-23 ENCOUNTER — HOSPITAL ENCOUNTER (EMERGENCY)
Age: 36
Discharge: HOME | End: 2021-05-23
Attending: EMERGENCY MEDICINE
Payer: MEDICARE

## 2021-05-23 VITALS
RESPIRATION RATE: 18 BRPM | TEMPERATURE: 98.4 F | BODY MASS INDEX: 63.77 KG/M2 | DIASTOLIC BLOOD PRESSURE: 74 MMHG | WEIGHT: 293 LBS | OXYGEN SATURATION: 98 % | HEART RATE: 72 BPM | SYSTOLIC BLOOD PRESSURE: 138 MMHG

## 2021-05-23 DIAGNOSIS — N93.9 VAGINAL BLEEDING: Primary | ICD-10-CM

## 2021-05-23 LAB
ABO/RH: NORMAL
ABSOLUTE EOS #: 0.2 K/UL (ref 0–0.4)
ABSOLUTE IMMATURE GRANULOCYTE: ABNORMAL K/UL (ref 0–0.3)
ABSOLUTE LYMPH #: 2.8 K/UL (ref 1–4.8)
ABSOLUTE MONO #: 0.9 K/UL (ref 0.1–1.3)
ALBUMIN SERPL-MCNC: 3.9 G/DL (ref 3.5–5.2)
ALBUMIN/GLOBULIN RATIO: ABNORMAL (ref 1–2.5)
ALP BLD-CCNC: 84 U/L (ref 35–104)
ALT SERPL-CCNC: 20 U/L (ref 5–33)
ANION GAP SERPL CALCULATED.3IONS-SCNC: 8 MMOL/L (ref 9–17)
ANTIBODY SCREEN: NEGATIVE
ARM BAND NUMBER: NORMAL
AST SERPL-CCNC: 18 U/L
BASOPHILS # BLD: 1 % (ref 0–2)
BASOPHILS ABSOLUTE: 0.1 K/UL (ref 0–0.2)
BILIRUB SERPL-MCNC: <0.15 MG/DL (ref 0.3–1.2)
BUN BLDV-MCNC: 16 MG/DL (ref 6–20)
BUN/CREAT BLD: ABNORMAL (ref 9–20)
CALCIUM SERPL-MCNC: 9.2 MG/DL (ref 8.6–10.4)
CHLORIDE BLD-SCNC: 102 MMOL/L (ref 98–107)
CO2: 28 MMOL/L (ref 20–31)
CREAT SERPL-MCNC: 0.69 MG/DL (ref 0.5–0.9)
DIFFERENTIAL TYPE: ABNORMAL
EOSINOPHILS RELATIVE PERCENT: 2 % (ref 0–4)
EXPIRATION DATE: NORMAL
GFR AFRICAN AMERICAN: >60 ML/MIN
GFR NON-AFRICAN AMERICAN: >60 ML/MIN
GFR SERPL CREATININE-BSD FRML MDRD: ABNORMAL ML/MIN/{1.73_M2}
GFR SERPL CREATININE-BSD FRML MDRD: ABNORMAL ML/MIN/{1.73_M2}
GLUCOSE BLD-MCNC: 109 MG/DL (ref 70–99)
HCG QUALITATIVE: NEGATIVE
HCT VFR BLD CALC: 34.1 % (ref 36–46)
HEMOGLOBIN: 11.4 G/DL (ref 12–16)
IMMATURE GRANULOCYTES: ABNORMAL %
INR BLD: 1
LYMPHOCYTES # BLD: 28 % (ref 24–44)
MCH RBC QN AUTO: 27.2 PG (ref 26–34)
MCHC RBC AUTO-ENTMCNC: 33.3 G/DL (ref 31–37)
MCV RBC AUTO: 81.6 FL (ref 80–100)
MONOCYTES # BLD: 9 % (ref 1–7)
NRBC AUTOMATED: ABNORMAL PER 100 WBC
PDW BLD-RTO: 14.3 % (ref 11.5–14.9)
PLATELET # BLD: 400 K/UL (ref 150–450)
PLATELET ESTIMATE: ABNORMAL
PMV BLD AUTO: 7.8 FL (ref 6–12)
POTASSIUM SERPL-SCNC: 4.2 MMOL/L (ref 3.7–5.3)
PROTHROMBIN TIME: 13 SEC (ref 11.8–14.6)
RBC # BLD: 4.17 M/UL (ref 4–5.2)
RBC # BLD: ABNORMAL 10*6/UL
SEG NEUTROPHILS: 60 % (ref 36–66)
SEGMENTED NEUTROPHILS ABSOLUTE COUNT: 6.3 K/UL (ref 1.3–9.1)
SODIUM BLD-SCNC: 138 MMOL/L (ref 135–144)
TOTAL PROTEIN: 7.5 G/DL (ref 6.4–8.3)
WBC # BLD: 10.3 K/UL (ref 3.5–11)
WBC # BLD: ABNORMAL 10*3/UL

## 2021-05-23 PROCEDURE — 86850 RBC ANTIBODY SCREEN: CPT

## 2021-05-23 PROCEDURE — 99283 EMERGENCY DEPT VISIT LOW MDM: CPT

## 2021-05-23 PROCEDURE — 86901 BLOOD TYPING SEROLOGIC RH(D): CPT

## 2021-05-23 PROCEDURE — 80053 COMPREHEN METABOLIC PANEL: CPT

## 2021-05-23 PROCEDURE — 85610 PROTHROMBIN TIME: CPT

## 2021-05-23 PROCEDURE — 86900 BLOOD TYPING SEROLOGIC ABO: CPT

## 2021-05-23 PROCEDURE — 84703 CHORIONIC GONADOTROPIN ASSAY: CPT

## 2021-05-23 PROCEDURE — 85025 COMPLETE CBC W/AUTO DIFF WBC: CPT

## 2021-05-23 PROCEDURE — 36415 COLL VENOUS BLD VENIPUNCTURE: CPT

## 2021-05-23 RX ORDER — ACETAMINOPHEN 500 MG
1000 TABLET ORAL EVERY 6 HOURS PRN
Qty: 60 TABLET | Refills: 0 | Status: SHIPPED | OUTPATIENT
Start: 2021-05-23 | End: 2021-12-29

## 2021-05-23 RX ORDER — ACETAMINOPHEN 500 MG
1000 TABLET ORAL ONCE
Status: DISCONTINUED | OUTPATIENT
Start: 2021-05-23 | End: 2021-05-23 | Stop reason: HOSPADM

## 2021-05-23 RX ORDER — NAPROXEN 500 MG/1
500 TABLET ORAL 2 TIMES DAILY
Qty: 20 TABLET | Refills: 0 | Status: SHIPPED | OUTPATIENT
Start: 2021-05-23 | End: 2021-12-29

## 2021-05-23 RX ORDER — NAPROXEN 250 MG/1
500 TABLET ORAL ONCE
Status: DISCONTINUED | OUTPATIENT
Start: 2021-05-23 | End: 2021-05-23 | Stop reason: HOSPADM

## 2021-05-23 ASSESSMENT — PAIN DESCRIPTION - PAIN TYPE: TYPE: ACUTE PAIN

## 2021-05-23 ASSESSMENT — PAIN DESCRIPTION - LOCATION: LOCATION: ABDOMEN

## 2021-05-23 ASSESSMENT — PAIN DESCRIPTION - FREQUENCY: FREQUENCY: CONTINUOUS

## 2021-05-23 ASSESSMENT — PAIN DESCRIPTION - DESCRIPTORS: DESCRIPTORS: CRAMPING

## 2021-05-23 ASSESSMENT — PAIN SCALES - GENERAL: PAINLEVEL_OUTOF10: 4

## 2021-05-23 ASSESSMENT — PAIN DESCRIPTION - ORIENTATION: ORIENTATION: LOWER

## 2021-05-23 NOTE — ED PROVIDER NOTES
EMERGENCY DEPARTMENT ENCOUNTER    Pt Name: London Alonso  MRN: [id number]  [place] 1985  Date of evaluation: 21  CHIEF COMPLAINT       Chief Complaint   Patient presents with    Vaginal Bleeding     Pt states vaginal bleeding passing several large clots since 8:45 am today. Pt states implant placed Feb stating after intercourse since she bleeds. Pt states she has never passed clots     HISTORY OF PRESENT ILLNESS     Vaginal Bleeding  Quality:  Clots and dark red  Severity:  Severe  Onset quality:  Gradual  Duration:  2 hours  Timing:  Constant  Chronicity:  New  Possible pregnancy: no    Context: after intercourse    Context comment:  Had intercourse yesterday  Relieved by:  Nothing  Worsened by:  Nothing  Ineffective treatments:  None tried    Has Mirena IUD, placed in January by Dr Dru Rivera  Has had vaginal bleeding after intercourse since the IUD has been in place    REVIEW OF SYSTEMS     Review of Systems   Genitourinary: Positive for vaginal bleeding. All other systems reviewed and are negative.     PASTMEDICAL HISTORY     Past Medical History:   Diagnosis Date    Class 3 severe obesity due to excess calories without serious comorbidity with body mass index (BMI) of 60.0 to 69.9 in adult [facility]) 2019    Essential hypertension 2019    Hyperlipidemia 2016    Hyperlipidemia      Past Problem List  Patient Active Problem List   Diagnosis Code    Hyperlipidemia E78.5    Pre-diabetes R73.03    Class 3 severe obesity due to excess calories without serious comorbidity with body mass index (BMI) of 60.0 to 69.9 in adult ([facility] [address] E66.01, Z68.44    Essential hypertension I10    Atypical squamous cells cannot exclude high grade squamous intraepithelial lesion on cytologic smear of cervix (ASC-H) R87.611     SURGICAL HISTORY       Past Surgical History:   Procedure Laterality Date     SECTION      INTRAUTERINE DEVICE INSERTION  2021    Mirena [address] [id number] Lot [id number] Exp 2023     CURRENT MEDICATIONS       Previous Medications    FLUTICASONE (FLONASE) 50 MCG/ACT NASAL SPRAY    1 spray by Each Nostril route daily    LOSARTAN-HYDROCHLOROTHIAZIDE (HYZAAR) 100-25 MG PER TABLET    Take 1 tablet by mouth daily    METOPROLOL SUCCINATE (TOPROL XL) 100 MG EXTENDED RELEASE TABLET    TAKE ONE TABLET BY MOUTH DAILY     ALLERGIES     is allergic to vancomycin. FAMILY HISTORY     She indicated that her mother is alive. She indicated that her father is alive. She indicated that her maternal grandmother is . She indicated that her maternal grandfather is . She indicated that her paternal grandmother is alive. She indicated that her paternal grandfather is alive. SOCIAL HISTORY       Social History     Tobacco Use    Smoking status: Never Smoker    Smokeless tobacco: Never Used   Vaping Use    Vaping Use: Never used   Substance Use Topics    Alcohol use: No    Drug use: No     PHYSICAL EXAM     INITIAL VITALS: /74   Pulse 72   Temp 98.4 °F (36.9 °C)   Resp 18   Wt (!) 360 lb (163.3 kg)   LMP 2021 (Exact Date)   SpO2 98%   BMI 63.77 kg/m²    Physical Exam  Constitutional:       General: She is not in acute distress. Appearance: Normal appearance. She is well-developed. She is not diaphoretic. HENT:      Head: Normocephalic and atraumatic. Right Ear: External ear normal.      Left Ear: External ear normal.      Nose: Nose normal. No congestion. Mouth/Throat:      Mouth: Mucous membranes are moist.      Pharynx: Oropharynx is clear. Eyes:      General:         Right eye: No discharge. Left eye: No discharge. Conjunctiva/sclera: Conjunctivae normal.      Pupils: Pupils are equal, round, and reactive to light. Neck:      Trachea: No tracheal deviation. Cardiovascular:      Rate and Rhythm: Normal rate and regular rhythm. Pulses: Normal pulses. Heart sounds: Normal heart sounds.    Pulmonary:      Effort: Pulmonary effort is normal. No respiratory distress. Breath sounds: Normal breath sounds. No stridor. No wheezing or rales. Abdominal:      Palpations: Abdomen is soft. Tenderness: There is no abdominal tenderness. There is no guarding or rebound. Genitourinary:     Comments: Female pct chaperone present Ayana Mccrary  No active bleeding  Mild blood in vaginal cavity  Cervix visualized, IUD strings visualized  No discharge present  Musculoskeletal:         General: No tenderness or deformity. Normal range of motion. Cervical back: Normal range of motion and neck supple. Skin:     General: Skin is warm and dry. Capillary Refill: Capillary refill takes less than 2 seconds. Findings: No erythema or rash. Neurological:      General: No focal deficit present. Mental Status: She is alert and oriented to person, place, and time. Cranial Nerves: No cranial nerve deficit. Coordination: Coordination normal.   Psychiatric:         Mood and Affect: Mood normal.         Behavior: Behavior normal.         Thought Content: Thought content normal.         Judgment: Judgment normal.         MEDICAL DECISION MAKIN:08 PM EDT  Reviewed labs  Serum preg negative  Do not suspect ectopic  Bleeding stopped  Discussed with patient anticipatory guidance, discharge instructions, follow up her GYN 24 hours           Procedures    DIAGNOSTIC RESULTS     LABS: All lab results were reviewed by myself, and all abnormals are listed below.   Labs Reviewed   CBC WITH AUTO DIFFERENTIAL - Abnormal; Notable for the following components:       Result Value    Hemoglobin 11.4 (*)     Hematocrit 34.1 (*)     Monocytes 9 (*)     All other components within normal limits   COMPREHENSIVE METABOLIC PANEL - Abnormal; Notable for the following components:    Glucose 109 (*)     Anion Gap 8 (*)     Total Bilirubin <0.15 (*)     All other components within normal limits   PROTIME-INR   HCG, SERUM, QUALITATIVE   TYPE AND SCREEN       EMERGENCY DEPARTMENTCOURSE:         Vitals:    Vitals:    05/23/21 1035   BP: 138/74   Pulse: 72   Resp: 18   Temp: 98.4 °F (36.9 °C)   SpO2: 98%   Weight: (!) 360 lb (163.3 kg)       The patient was given the following medications while in the emergency department:  Orders Placed This Encounter   Medications    acetaminophen (TYLENOL) 500 MG tablet     Sig: Take 2 tablets by mouth every 6 hours as needed for Pain     Dispense:  60 tablet     Refill:  0    naproxen (NAPROSYN) 500 MG tablet     Sig: Take 1 tablet by mouth 2 times daily     Dispense:  20 tablet     Refill:  0    acetaminophen (TYLENOL) tablet 1,000 mg    naproxen (NAPROSYN) tablet 500 mg       FINAL IMPRESSION      1.  Vaginal bleeding          DISPOSITION/PLAN   DISPOSITION Decision To Discharge 05/23/2021 12:06:18 PM      PATIENT REFERRED TO:  Donald Jordan MD  [address] [place] [address]  [phone]    Schedule an appointment as soon as possible for a visit in 1 day      DISCHARGE MEDICATIONS:  New Prescriptions    ACETAMINOPHEN (TYLENOL) 500 MG TABLET    Take 2 tablets by mouth every 6 hours as needed for Pain    NAPROXEN (NAPROSYN) 500 MG TABLET    Take 1 tablet by mouth 2 times daily     Maribel White MD  Attending Emergency Physician                    Maribel White MD  05/23/21 5713

## 2021-05-26 ENCOUNTER — NURSE ONLY (OUTPATIENT)
Dept: BARIATRICS/WEIGHT MGMT | Age: 36
End: 2021-05-26

## 2021-05-28 ENCOUNTER — HOSPITAL ENCOUNTER (OUTPATIENT)
Dept: SLEEP CENTER | Age: 36
Discharge: HOME | End: 2021-05-30
Payer: MEDICARE

## 2021-05-28 VITALS
RESPIRATION RATE: 28 BRPM | WEIGHT: 293 LBS | HEART RATE: 89 BPM | HEIGHT: 63 IN | OXYGEN SATURATION: 95 % | BODY MASS INDEX: 51.91 KG/M2

## 2021-05-28 DIAGNOSIS — R06.83 SNORING: ICD-10-CM

## 2021-05-28 DIAGNOSIS — R06.09 DYSPNEA ON EXERTION: ICD-10-CM

## 2021-05-28 PROCEDURE — 95810 POLYSOM 6/> YRS 4/> PARAM: CPT

## 2021-06-02 ENCOUNTER — OFFICE VISIT (OUTPATIENT)
Dept: BARIATRICS/WEIGHT MGMT | Age: 36
End: 2021-06-02
Payer: MEDICARE

## 2021-06-02 VITALS
DIASTOLIC BLOOD PRESSURE: 86 MMHG | HEIGHT: 63 IN | HEART RATE: 76 BPM | SYSTOLIC BLOOD PRESSURE: 138 MMHG | WEIGHT: 293 LBS | BODY MASS INDEX: 51.91 KG/M2

## 2021-06-02 DIAGNOSIS — E66.01 MORBID OBESITY WITH BMI OF 60.0-69.9, ADULT (HCC): ICD-10-CM

## 2021-06-02 DIAGNOSIS — I10 ESSENTIAL HYPERTENSION: Primary | ICD-10-CM

## 2021-06-02 DIAGNOSIS — E55.9 VITAMIN D DEFICIENCY: ICD-10-CM

## 2021-06-02 DIAGNOSIS — E78.5 HYPERLIPIDEMIA, UNSPECIFIED HYPERLIPIDEMIA TYPE: ICD-10-CM

## 2021-06-02 DIAGNOSIS — R73.03 PRE-DIABETES: ICD-10-CM

## 2021-06-02 PROCEDURE — G8427 DOCREV CUR MEDS BY ELIG CLIN: HCPCS | Performed by: NURSE PRACTITIONER

## 2021-06-02 PROCEDURE — 1036F TOBACCO NON-USER: CPT | Performed by: NURSE PRACTITIONER

## 2021-06-02 PROCEDURE — 99213 OFFICE O/P EST LOW 20 MIN: CPT | Performed by: NURSE PRACTITIONER

## 2021-06-02 PROCEDURE — G8417 CALC BMI ABV UP PARAM F/U: HCPCS | Performed by: NURSE PRACTITIONER

## 2021-06-02 NOTE — PROGRESS NOTES
Medical Weight Management Progress Note    Subjective      Patient being seen for medically supervised weight loss for the chronic conditions of HTN, HLD, Prediabetes. She is working on the behavior changes discussed at the initial appointment. Patient continues on diet plan. Physical activity includes walking. Weight loss since last visit is 2 lbs. Sleep study completed and pending report. Psych eval scheduled 21. Needs to schedule EGD. No current issues. Working toward bariatric surgery:    [] Sleeve Gastrectomy                                                           [x] Michael-en-Y Gastric Bypass    Allergies: Allergies   Allergen Reactions    Vancomycin Itching       Past Medical History:     Past Medical History:   Diagnosis Date    Class 3 severe obesity due to excess calories without serious comorbidity with body mass index (BMI) of 60.0 to 69.9 in adult [facility]) 2019    Essential hypertension 2019    Hyperlipidemia 2016    Hyperlipidemia    .     Past Surgical History:  Past Surgical History:   Procedure Laterality Date     SECTION      INTRAUTERINE DEVICE INSERTION  2021    [address] [address]166-198-67 Lot [id number] Exp 2023       Family History:  Family History   Problem Relation Age of Onset    Heart Disease Mother     Other Mother     Heart Disease Father     Other Father     Depression Mother     Diabetes Mother     High Blood Pressure Mother     Diabetes Maternal Grandmother     High Blood Pressure Maternal Grandmother     Kidney Disease Maternal Grandmother     Cancer Maternal Grandfather         prostate    Diabetes Maternal Grandfather     Heart Disease Maternal Grandfather     High Blood Pressure Maternal Grandfather     Cancer Paternal Grandmother         breast and brain       Social History:  Social History     Socioeconomic History    Marital status: Single     Spouse name: Not on file    Number of children: 1    Years of education: Not on file    Highest education level: Not on file   Occupational History    Not on file   Tobacco Use    Smoking status: Never Smoker    Smokeless tobacco: Never Used   Vaping Use    Vaping Use: Never used   Substance and Sexual Activity    Alcohol use: No    Drug use: No    Sexual activity: Not Currently   Other Topics Concern    Not on file   Social History Narrative    ** Merged History Encounter **          Social Determinants of Health     Financial Resource Strain:     Difficulty of Paying Living Expenses:    Food Insecurity:     Worried About Running Out of Food in the Last Year:     Ran Out of Food in the Last Year:    Transportation Needs:     Lack of Transportation (Medical):      Lack of Transportation (Non-Medical):    Physical Activity:     Days of Exercise per Week:     Minutes of Exercise per Session:    Stress:     Feeling of Stress :    Social Connections:     Frequency of Communication with Friends and Family:     Frequency of Social Gatherings with Friends and Family:     Attends [employer] Services:     Active Member of Clubs or Organizations:     Attends Club or Organization Meetings:     Marital Status:    Intimate Partner Violence:     Fear of Current or Ex-Partner:     Emotionally Abused:     Physically Abused:     Sexually Abused:        Current Medications:  Current Outpatient Medications   Medication Sig Dispense Refill    acetaminophen (TYLENOL) 500 MG tablet Take 2 tablets by mouth every 6 hours as needed for Pain 60 tablet 0    naproxen (NAPROSYN) 500 MG tablet Take 1 tablet by mouth 2 times daily 20 tablet 0    metoprolol succinate (TOPROL XL) 100 MG extended release tablet TAKE ONE TABLET BY MOUTH DAILY 90 tablet 1    losartan-hydroCHLOROthiazide (HYZAAR) 100-25 MG per tablet Take 1 tablet by mouth daily 90 tablet 1    fluticasone (FLONASE) 50 MCG/ACT nasal spray 1 spray by Each Nostril route daily 1 Bottle 0     Current Facility-Administered Medications   Medication Dose Route Frequency Provider Last Rate Last Admin    levonorgestrel (MIRENA) IUD 52 mg 1 each  1 each Intrauterine Once Lissa Barajas MD   1 each at 01/05/21 1210       Vital Signs:  /86 (Site: Right Upper Arm, Position: Sitting, Cuff Size: Large Adult)   Pulse 76   Ht 5' 3\" (1.6 m)   Wt (!) 370 lb (167.8 kg)   LMP 05/07/2021 (Exact Date)   BMI 65.54 kg/m²     BMI/Height/Weight:  Body mass index is 65.54 kg/m². Review of Systems - A review of systems was performed. All was negative unless otherwise documented in HPI. Constitutional: Negative for fever, chills and diaphoresis. HENT: Negative for hearing loss and trouble swallowing. Eyes: Negative for photophobia and visual disturbance. Respiratory: Negative for cough, shortness of breath and wheezing. Cardiovascular: Negative for chest pain and palpitations. Gastrointestinal: Negative for nausea, vomiting, abdominal pain, diarrhea, constipation, blood in stool and abdominal distention. Endocrine: Negative for polydipsia, polyphagia and polyuria. Genitourinary: Negative for dysuria, frequency, hematuria and difficulty urinating. Musculoskeletal: Negative for myalgias, joint swelling. Skin: Negative for pallor and rash. Neurological: Negative for dizziness, tremors, light-headedness and headaches. Psychiatric/Behavioral: Negative for sleep disturbance and dysphoric mood. Objective:      Physical Exam   Vital signs reviewed. General: Well-developed and well-nourished. No acute distress. Skin: Warm, dry and intact. HEENT: Normocephalic. EOMs intact. Conjunctivae normal. Neck supple. Cardiovascular: Normal rate, regular rhythm. Pulmonary/Chest: Normal effort. Lungs clear to auscultation. No rales, rhonchi or wheezing. Abdominal: Positive bowel sounds. Soft, nontender. Nondistended. Musculoskeletal: Movement x4. No edema.   Neurological: Gait normal. Alert and oriented to person, place, and time. Psychiatric: Normal mood and affect. Speech and behavior normal. Judgment and thought content normal. Cognition and memory intact. Assessment:       Diagnosis Orders   1. Essential hypertension  Vitamin B1    Vitamin A    Zinc    Magnesium    PTH, Intact    Urine Drug Screen   2. Pre-diabetes  Vitamin B1    Vitamin A    Zinc    Magnesium    PTH, Intact    Urine Drug Screen   3. Hyperlipidemia, unspecified hyperlipidemia type  Vitamin B1    Vitamin A    Zinc    Magnesium    PTH, Intact    Urine Drug Screen   4. Morbid obesity with BMI of 60.0-69.9, adult (HCC)  Vitamin B1    Vitamin A    Zinc    Magnesium    PTH, Intact    Urine Drug Screen   5. Vitamin D deficiency  Vitamin B1    Vitamin A    Zinc    Magnesium    PTH, Intact    Urine Drug Screen       Plan:    Dietitian visit today. Patient was encouraged to journal all food intake. Keep calorie level at approximately 0605-1579. Protein intake is to be a minimum of 60-80 grams per day. Water drinking was encouraged with a goal of 64oz-128oz daily. Beverages to be calorie free except for milk. Every other beverage should be water. Avoid soda. Continue to increase level of physical activity. Encouraged use of exercise log. Additional bariatric labs ordered. Follow-up  Return in about 1 month (around 7/2/2021).     Orders this encounter:  Orders Placed This Encounter   Procedures    Vitamin B1     Standing Status:   Future     Standing Expiration Date:   6/2/2022    Vitamin A     Standing Status:   Future     Standing Expiration Date:   6/2/2022    Zinc     Standing Status:   Future     Standing Expiration Date:   6/2/2022    Magnesium     Standing Status:   Future     Standing Expiration Date:   6/2/2022    PTH, Intact     Standing Status:   Future     Standing Expiration Date:   6/2/2022    Urine Drug Screen     Standing Status:   Future     Standing Expiration Date:   6/2/2022       Prescriptions this encounter:  No orders of

## 2021-06-02 NOTE — PROGRESS NOTES
Medical Nutrition Therapy   Metabolic and Bariatric Surgery         Supervised diet and exercise preparation  Visit 1 out of 3  Pt reports:      Changes in eating patterns to promote health are noted below on the goals number 22-25    Vitals: Wt Readings from Last 3 Encounters:   06/02/21 (!) 370 lb (167.8 kg)   05/28/21 (!) 360 lb (163.3 kg)   05/23/21 (!) 360 lb (163.3 kg)         Nutrition Assessment:   PES: Knowledge deficit related to healthy behaviors that support weight management post weight loss surgery as evidenced by Body mass index is 65.54 kg/m². Nutrition Assessment of Goal Attainment:  TREATMENT GOALS:    1. Pt  Completed 3 out of 3 goals. 2.TREATMENT GOALS FOR UPCOMING WEEK: continue all previous goals and add: # 23    All goals were planned with and agreed on by the patient. I want to improve my health because Live a long and healthy life. appt # NA G What is your next step? C 1 2 3 4 5 6 7 8 9     0  one I will read the education binder provided to me and the   x 100               2 I will make my pschological evaluation appoinment. x              1  3 I will bring this goal card to every appointment. 100              x 4 I will eliminate all tobacco/nicotine. x 5 I will limit alcoholic beverages to 1-5VR per week. x 6 I will limit dining out to 3 times per week or less. x 7 I will eliminate sugary beverages. x 8 I will eliminate carbonated beverages. x 9 I will eliminate drinking with a straw. 10 I will limit caffeinated beverages to 16oz daily. 11 I will limit cold cereals prepared with milk. 12 I will do a 5 minute reflection. 13 I will food journal daily. 1  14 I will log my exercise daily. 100               15 I will determine my  calcium and multivitamin plan. 16 I will purchase multivitamin. 17 I will start taking multivitamins following my plan. 18 I will have 1-2 servings of protein present at each meal.                 19 I will eat every 3-5 hours. x 20 I will drink 64oz of fluid daily. 21 I will follow the 15-30-15 guideline. 22 I will eat protein first at all meals followed by vegetables  Fruit and lastly whole grains. 1  23 My first one diet neutral approach is:  I will continue to portion control. 24 my second diet neutral approach is:                 25 My third diet neutral approach is:                                                                        Do you understand your goals? y    Do you have the information you need to achieve your goals? y    Do you have any questions  right now? n        [x]  Consistent goal achievement in the program thus far and further success with goals is expected. []  Unable to consistently make progress in goal achievement. At this time patient is not moving forward  in developing the skills needed for success after surgery. Plan:    Continue to follow monthly and review goals.          [x]  Nutrition visits complete    []

## 2021-06-14 NOTE — TELEPHONE ENCOUNTER
Mando Montoya is calling to request a refill on the following medication(s):    Last Visit Date (If Applicable):  5/1/8969    Next Visit Date:    Visit date not found    Medication Request:  Requested Prescriptions     Pending Prescriptions Disp Refills    losartan-hydroCHLOROthiazide (HYZAAR) 100-25 MG per tablet 90 tablet 1     Sig: Take 1 tablet by mouth daily

## 2021-06-15 RX ORDER — LOSARTAN POTASSIUM AND HYDROCHLOROTHIAZIDE 25; 100 MG/1; MG/1
1 TABLET ORAL DAILY
Qty: 90 TABLET | Refills: 1 | Status: SHIPPED | OUTPATIENT
Start: 2021-06-15 | End: 2022-01-12 | Stop reason: SDUPTHER

## 2021-06-16 ENCOUNTER — OFFICE VISIT (OUTPATIENT)
Dept: OBGYN CLINIC | Age: 36
End: 2021-06-16
Payer: MEDICARE

## 2021-06-16 VITALS
BODY MASS INDEX: 66.43 KG/M2 | HEART RATE: 76 BPM | TEMPERATURE: 96.8 F | WEIGHT: 293 LBS | DIASTOLIC BLOOD PRESSURE: 77 MMHG | SYSTOLIC BLOOD PRESSURE: 123 MMHG

## 2021-06-16 DIAGNOSIS — N92.1 BREAKTHROUGH BLEEDING ASSOCIATED WITH INTRAUTERINE DEVICE (IUD): Primary | ICD-10-CM

## 2021-06-16 DIAGNOSIS — Z97.5 BREAKTHROUGH BLEEDING ASSOCIATED WITH INTRAUTERINE DEVICE (IUD): Primary | ICD-10-CM

## 2021-06-16 DIAGNOSIS — N93.9 ABNORMAL UTERINE BLEEDING (AUB): ICD-10-CM

## 2021-06-16 LAB — STATUS: NORMAL

## 2021-06-16 PROCEDURE — G8417 CALC BMI ABV UP PARAM F/U: HCPCS | Performed by: OBSTETRICS & GYNECOLOGY

## 2021-06-16 PROCEDURE — 99213 OFFICE O/P EST LOW 20 MIN: CPT | Performed by: OBSTETRICS & GYNECOLOGY

## 2021-06-16 PROCEDURE — 1036F TOBACCO NON-USER: CPT | Performed by: OBSTETRICS & GYNECOLOGY

## 2021-06-16 PROCEDURE — G8427 DOCREV CUR MEDS BY ELIG CLIN: HCPCS | Performed by: OBSTETRICS & GYNECOLOGY

## 2021-06-16 ASSESSMENT — ENCOUNTER SYMPTOMS
ABDOMINAL PAIN: 0
SHORTNESS OF BREATH: 0
BACK PAIN: 0
COUGH: 0

## 2021-06-16 NOTE — PROGRESS NOTES
[facility] & [facility] PHYSICIANS  [facility] - [address] [address] [facility]  Dept: [phone]  Dept Fax: [phone]    21    Chief Complaint   Patient presents with    Menstrual Problem     heavy periods  since IUd plascement in 2021 , after sex she bleeds, bleeding constant for a month , lmp unknown        [address] 30 y.o. has a complaint of heavy bleeding and passing large clots. She had a Mirena IUD placed 21. She says that anytime she has sex she bleeds. She says that she won't stop bleeding for a couple weeks. She started bleeding  after intercourse and has been having bleeding daily since then. Review of Systems   Constitutional: Negative for chills and fever. HENT: Negative for congestion. Respiratory: Negative for cough and shortness of breath. Cardiovascular: Negative for chest pain and palpitations. Gastrointestinal: Negative for abdominal pain. Genitourinary: Positive for menstrual problem. Negative for dyspareunia and vaginal discharge. Musculoskeletal: Negative for back pain. Neurological: Negative for dizziness and light-headedness. Psychiatric/Behavioral: The patient is not nervous/anxious. Gynecologic History  No LMP recorded (lmp unknown). (Menstrual status: Other - See Notes).   Contraception: IUD  Last Pap: 20  Results: abnormal  Last Mammogram: n/a    Obstetric History  : 1  Para: 1  AB: 0    Past Medical History:   Diagnosis Date    Class 3 severe obesity due to excess calories without serious comorbidity with body mass index (BMI) of 60.0 to 69.9 in adult [facility]) 2019    Essential hypertension 2019    Hyperlipidemia 2016    Hyperlipidemia      Past Surgical History:   Procedure Laterality Date     SECTION      INTRAUTERINE DEVICE INSERTION  2021    Mirena [address] [address]322-081-72 Lot [id number] Exp 2023     Allergies   Allergen Reactions    Vancomycin Itching Gatherings with Friends and Family:     Attends [employer] Services:     Active Member of Clubs or Organizations:     Attends Club or Organization Meetings:     Marital Status:    Intimate Partner Violence:     Fear of Current or Ex-Partner:     Emotionally Abused:     Physically Abused:     Sexually Abused:      Family History   Problem Relation Age of Onset    Heart Disease Mother     Other Mother     Heart Disease Father     Other Father     Depression Mother     Diabetes Mother     High Blood Pressure Mother     Diabetes Maternal Grandmother     High Blood Pressure Maternal Grandmother     Kidney Disease Maternal Grandmother     Cancer Maternal Grandfather         prostate    Diabetes Maternal Grandfather     Heart Disease Maternal Grandfather     High Blood Pressure Maternal Grandfather     Cancer Paternal Grandmother         breast and brain       Physical exam Physical Exam  Constitutional:       Appearance: Normal appearance. She is obese. HENT:      Head: Normocephalic. Eyes:      Extraocular Movements: Extraocular movements intact. Pulmonary:      Effort: Pulmonary effort is normal.   Neurological:      Mental Status: She is alert and oriented to person, place, and time. Psychiatric:         Mood and Affect: Mood normal.         Behavior: Behavior normal.         Thought Content: Thought content normal.         Judgment: Judgment normal.         Assessment/Plan  1. Breakthrough bleeding associated with intrauterine device (IUD)  2. Abnormal uterine bleeding (AUB)  - Will have pt perform ultrasound to see if IUD is in the correct location or not since she continues to have large blood clots postcoitally. If IUD is in the correct place pt is ok with seeing if the issue resolves in a few more months. If the IUD is in an abnormal location will remove and replace.    - US PELVIS COMPLETE; Future  - US NON OB TRANSVAGINAL;  Future    Pt to follow up RAMEZ Hatfield MD [address]

## 2021-06-17 DIAGNOSIS — R06.83 SNORING: Primary | ICD-10-CM

## 2021-06-17 DIAGNOSIS — G47.33 OSA (OBSTRUCTIVE SLEEP APNEA): ICD-10-CM

## 2021-06-21 ENCOUNTER — HOSPITAL ENCOUNTER (OUTPATIENT)
Dept: ULTRASOUND IMAGING | Age: 36
Discharge: HOME | End: 2021-06-23
Payer: MEDICARE

## 2021-06-21 DIAGNOSIS — N92.1 BREAKTHROUGH BLEEDING ASSOCIATED WITH INTRAUTERINE DEVICE (IUD): ICD-10-CM

## 2021-06-21 DIAGNOSIS — Z97.5 BREAKTHROUGH BLEEDING ASSOCIATED WITH INTRAUTERINE DEVICE (IUD): ICD-10-CM

## 2021-06-21 DIAGNOSIS — N93.9 ABNORMAL UTERINE BLEEDING (AUB): ICD-10-CM

## 2021-06-21 PROCEDURE — 76830 TRANSVAGINAL US NON-OB: CPT

## 2021-06-21 PROCEDURE — 76856 US EXAM PELVIC COMPLETE: CPT

## 2021-07-02 ENCOUNTER — PROCEDURE VISIT (OUTPATIENT)
Dept: OBGYN CLINIC | Age: 36
End: 2021-07-02
Payer: MEDICARE

## 2021-07-02 VITALS
HEART RATE: 92 BPM | HEIGHT: 63 IN | SYSTOLIC BLOOD PRESSURE: 134 MMHG | WEIGHT: 293 LBS | BODY MASS INDEX: 51.91 KG/M2 | DIASTOLIC BLOOD PRESSURE: 85 MMHG

## 2021-07-02 DIAGNOSIS — Z30.433 ENCOUNTER FOR REMOVAL AND REINSERTION OF IUD: Primary | ICD-10-CM

## 2021-07-02 DIAGNOSIS — T83.32XA INTRAUTERINE DEVICE (IUD) MIGRATION, INITIAL ENCOUNTER: ICD-10-CM

## 2021-07-02 PROCEDURE — 58300 INSERT INTRAUTERINE DEVICE: CPT | Performed by: OBSTETRICS & GYNECOLOGY

## 2021-07-02 PROCEDURE — 58301 REMOVE INTRAUTERINE DEVICE: CPT | Performed by: OBSTETRICS & GYNECOLOGY

## 2021-07-02 NOTE — PROGRESS NOTES
[facility] & [facility] PHYSICIANS  [facility] [place]  [address] [facility] [address]  Dept: [phone]    IUD Removal & Reinsertion Note        Sarah Quiros  2021  No LMP recorded (lmp unknown). (Menstrual status: Other - See Notes). HPI: The patient is requesting has a Mirena IUD in place that was placed 6 months ago. She has continued to have heavy almost constant bleeding and large clots. She had an IUD previously and had no periods. After an ultrasound was done it was determined the IUD was lower in the uterus and not quite in the right spot. She would like to have it removed and a new one placed to see if that makes a difference since she has previously liked having an IUD.       Past Medical History:   Diagnosis Date    Class 3 severe obesity due to excess calories without serious comorbidity with body mass index (BMI) of 60.0 to 69.9 in adult [facility]) 2019    Essential hypertension 2019    Hyperlipidemia 2016    Hyperlipidemia        Past Surgical History:   Procedure Laterality Date     SECTION      INTRAUTERINE DEVICE INSERTION  2021    Mirena [address] [id number] Lot [id number] Exp 2023       Social History     Tobacco Use    Smoking status: Never Smoker    Smokeless tobacco: Never Used   Vaping Use    Vaping Use: Never used   Substance Use Topics    Alcohol use: No    Drug use: No           MEDICATIONS:  Current Outpatient Medications   Medication Sig Dispense Refill    losartan-hydroCHLOROthiazide (HYZAAR) 100-25 MG per tablet Take 1 tablet by mouth daily 90 tablet 1    metoprolol succinate (TOPROL XL) 100 MG extended release tablet TAKE ONE TABLET BY MOUTH DAILY 90 tablet 1    acetaminophen (TYLENOL) 500 MG tablet Take 2 tablets by mouth every 6 hours as needed for Pain 60 tablet 0    naproxen (NAPROSYN) 500 MG tablet Take 1 tablet by mouth 2 times daily 20 tablet 0    fluticasone (FLONASE) 50 MCG/ACT nasal spray 1 spray by Each Nostril route daily 1 Bottle 0     Current Facility-Administered Medications   Medication Dose Route Frequency Provider Last Rate Last Admin    levonorgestrel (MIRENA) IUD 52 mg 1 each  1 each Intrauterine Once Santiago Helton MD   1 each at 01/05/21 1210         ALLERGIES:  Allergies as of 07/02/2021 - Fully Reviewed 07/02/2021   Allergen Reaction Noted    Vancomycin Itching          Vitals:    07/02/21 0738 07/02/21 0740   BP: (!) 143/85 134/85   Site: Left Lower Arm Right Lower Arm   Position: Sitting Sitting   Cuff Size: Large Adult Large Adult   Pulse: 93 92   Weight: (!) 378 lb 4 oz (171.6 kg)    Height: 5' 3\" (1.6 m)          Chaperone for Intimate Exam   Chaperone was offered and accepted as part of the rooming process.  Chaperone: Todd Stapleton        The patient was positioned comfortably on the exam table. After a bi-manual exam; the uterus was found to be  anteverted. There was no cervical motion tenderness or adnexal masses. The bladder was smooth, non-tender and without palpable masses. A sterile speculum was placed without incident. The site was then cleansed with betadine and the uterus was sounded to 7 cm. The Mirena IUD was opened and loaded into the delivery system. The wand was inserted to just past the internal portio and the button was retracted to the first line. The wand was held in place for 10 seconds and then the button was retracted to its final position while the IUD was moved to the fundus. The string was trimmed in standard fashion. Post procedure restrictions were reviewed and given to the patient. She was instructed to use barrier protection for sexually transmitted disease prevention as well as string checks/timing. The patient tolerated the procedure without difficulty. She was instructed to abstain for two weeks and use sydney-pads for the first 8 weeks.  She is to notify the office or go to the nearest Emergency Department if she experiences Abdominal Pain, Temperatures more than 101 F, Odiferous Vaginal Discharge, Dizziness or Shortness of breath. ASSESSMENT:  IUD Insertion   Diagnosis Orders   1. Encounter for removal and reinsertion of IUD  RI REMOVE INTRAUTERINE DEVICE    RI INSERT INTRAUTERINE DEVICE   2. Intrauterine device (IUD) migration, initial encounter  [address] [facility]     Patient Active Problem List    Diagnosis Date Noted    IUD migration 07/02/2021    Vitamin D deficiency 06/02/2021    Atypical squamous cells cannot exclude high grade squamous intraepithelial lesion on cytologic smear of cervix (ASC-H) 10/13/2020     [place] 10/13/20      Morbid obesity with BMI of 60.0-69.9, adult ([facility] [address] 02/06/2019    Essential hypertension 02/06/2019    Hyperlipidemia 05/08/2016    Pre-diabetes 05/08/2016     Family Planning    reports that she has never smoked.  She has never used smokeless tobacco.      PLAN:  Family Planning Counseling Completed  Barrier Recommendations  Removal of the Mirena IUD will be in 6 years on 7/2/27   Annual health follow-up  8/2021  Return to office in 4 wks for IUD check and annual    Boom Liz MD

## 2021-07-13 ENCOUNTER — OFFICE VISIT (OUTPATIENT)
Dept: BARIATRICS/WEIGHT MGMT | Age: 36
End: 2021-07-13
Payer: MEDICARE

## 2021-07-13 VITALS
WEIGHT: 293 LBS | SYSTOLIC BLOOD PRESSURE: 118 MMHG | DIASTOLIC BLOOD PRESSURE: 74 MMHG | HEART RATE: 68 BPM | BODY MASS INDEX: 51.91 KG/M2 | HEIGHT: 63 IN

## 2021-07-13 DIAGNOSIS — R73.03 PRE-DIABETES: ICD-10-CM

## 2021-07-13 DIAGNOSIS — E78.5 HYPERLIPIDEMIA, UNSPECIFIED HYPERLIPIDEMIA TYPE: ICD-10-CM

## 2021-07-13 DIAGNOSIS — I10 ESSENTIAL HYPERTENSION: Primary | ICD-10-CM

## 2021-07-13 DIAGNOSIS — E66.01 MORBID OBESITY WITH BMI OF 60.0-69.9, ADULT (HCC): ICD-10-CM

## 2021-07-13 PROCEDURE — 99213 OFFICE O/P EST LOW 20 MIN: CPT | Performed by: NURSE PRACTITIONER

## 2021-07-13 NOTE — PROGRESS NOTES
Medical Weight Management Progress Note    Subjective      Patient being seen for medically supervised weight loss for the chronic conditions of HTN, HLD, Prediabetes. She is working on the behavior changes discussed at the initial appointment. Patient continues on diet plan. Physical activity includes walking. Weight gain of 3 lbs since last visit. Sleep study completed and no CPAP needed. Psych eval scheduled 21. EGD scheduled 21. Needs to have labs drawn. No current issues. Working toward bariatric surgery:    [] Sleeve Gastrectomy                                                           [x] Michael-en-Y Gastric Bypass    Allergies: Allergies   Allergen Reactions    Vancomycin Itching       Past Medical History:     Past Medical History:   Diagnosis Date    Class 3 severe obesity due to excess calories without serious comorbidity with body mass index (BMI) of 60.0 to 69.9 in adult [facility]) 2019    Essential hypertension 2019    Hyperlipidemia 2016    Hyperlipidemia    .     Past Surgical History:  Past Surgical History:   Procedure Laterality Date     SECTION      INTRAUTERINE DEVICE INSERTION  2021    [address] [id number] Lot [id number] Exp 2023       Family History:  Family History   Problem Relation Age of Onset    Heart Disease Mother     Other Mother     Heart Disease Father     Other Father     Depression Mother     Diabetes Mother     High Blood Pressure Mother     Diabetes Maternal Grandmother     High Blood Pressure Maternal Grandmother     Kidney Disease Maternal Grandmother     Cancer Maternal Grandfather         prostate    Diabetes Maternal Grandfather     Heart Disease Maternal Grandfather     High Blood Pressure Maternal Grandfather     Cancer Paternal Grandmother         breast and brain       Social History:  Social History     Socioeconomic History    Marital status: Single     Spouse name: Not on file    Number of children: 1    Years of education: Not on file    Highest education level: Not on file   Occupational History    Not on file   Tobacco Use    Smoking status: Never Smoker    Smokeless tobacco: Never Used   Vaping Use    Vaping Use: Never used   Substance and Sexual Activity    Alcohol use: No    Drug use: No    Sexual activity: Not Currently   Other Topics Concern    Not on file   Social History Narrative    ** Merged History Encounter **          Social Determinants of Health     Financial Resource Strain:     Difficulty of Paying Living Expenses:    Food Insecurity:     Worried About Running Out of Food in the Last Year:     [address] in the Last Year:    Transportation Needs:     Lack of Transportation (Medical):      Lack of Transportation (Non-Medical):    Physical Activity:     Days of Exercise per Week:     Minutes of Exercise per Session:    Stress:     Feeling of Stress :    Social Connections:     Frequency of Communication with Friends and Family:     Frequency of Social Gatherings with Friends and Family:     Attends Yarsani Services:     Active Member of Clubs or Organizations:     Attends Club or Organization Meetings:     Marital Status:    Intimate Partner Violence:     Fear of Current or Ex-Partner:     Emotionally Abused:     Physically Abused:     Sexually Abused:        Current Medications:  Current Outpatient Medications   Medication Sig Dispense Refill    losartan-hydroCHLOROthiazide (HYZAAR) 100-25 MG per tablet Take 1 tablet by mouth daily 90 tablet 1    acetaminophen (TYLENOL) 500 MG tablet Take 2 tablets by mouth every 6 hours as needed for Pain 60 tablet 0    naproxen (NAPROSYN) 500 MG tablet Take 1 tablet by mouth 2 times daily 20 tablet 0    metoprolol succinate (TOPROL XL) 100 MG extended release tablet TAKE ONE TABLET BY MOUTH DAILY 90 tablet 1    fluticasone (FLONASE) 50 MCG/ACT nasal spray 1 spray by Each Nostril route daily 1 Bottle 0 Current Facility-Administered Medications   Medication Dose Route Frequency Provider Last Rate Last Admin    levonorgestrel (MIRENA) IUD 52 mg 1 each  1 each Intrauterine Once Siomara Fowler MD   1 each at 07/02/21 1144    levonorgestrel (MIRENA) IUD 52 mg 1 each  1 each Intrauterine Once Siomara Fowler MD   1 each at 01/05/21 1210       Vital Signs:  /74 (Site: Right Upper Arm, Position: Sitting, Cuff Size: Large Adult)   Pulse 68   Ht 5' 3\" (1.6 m)   Wt (!) 373 lb (169.2 kg)   LMP  (LMP Unknown)   BMI 66.07 kg/m²     BMI/Height/Weight:  Body mass index is 66.07 kg/m². Review of Systems - A review of systems was performed. All was negative unless otherwise documented in HPI. Constitutional: Negative for fever, chills and diaphoresis. HENT: Negative for hearing loss and trouble swallowing. Eyes: Negative for photophobia and visual disturbance. Respiratory: Negative for cough, shortness of breath and wheezing. Cardiovascular: Negative for chest pain and palpitations. Gastrointestinal: Negative for nausea, vomiting, abdominal pain, diarrhea, constipation, blood in stool and abdominal distention. Endocrine: Negative for polydipsia, polyphagia and polyuria. Genitourinary: Negative for dysuria, frequency, hematuria and difficulty urinating. Musculoskeletal: Negative for myalgias, joint swelling. Skin: Negative for pallor and rash. Neurological: Negative for dizziness, tremors, light-headedness and headaches. Psychiatric/Behavioral: Negative for sleep disturbance and dysphoric mood. Objective:      Physical Exam   Vital signs reviewed. General: Well-developed and well-nourished. No acute distress. Skin: Warm, dry and intact. HEENT: Normocephalic. EOMs intact. Conjunctivae normal. Neck supple. Cardiovascular: Normal rate, regular rhythm. Pulmonary/Chest: Normal effort. Lungs clear to auscultation. No rales, rhonchi or wheezing.    Abdominal: Positive bowel sounds. Soft, nontender. Nondistended. Musculoskeletal: Movement x4. No edema. Neurological: Gait normal. Alert and oriented to person, place, and time. Psychiatric: Normal mood and affect. Speech and behavior normal. Judgment and thought content normal. Cognition and memory intact. Assessment:       Diagnosis Orders   1. Essential hypertension     2. Pre-diabetes     3. Hyperlipidemia, unspecified hyperlipidemia type     4. Morbid obesity with BMI of 60.0-69.9, adult [facility])         Plan:    Dietitian visit today. Patient was encouraged to journal all food intake. Keep calorie level at approximately 2803-9006. Protein intake is to be a minimum of 60-80 grams per day. Water drinking was encouraged with a goal of 64oz-128oz daily. Beverages to be calorie free except for milk. Every other beverage should be water. Avoid soda. Continue to increase level of physical activity. Encouraged use of exercise log. Follow-up  Return in about 1 month (around 8/13/2021). Orders this encounter:  No orders of the defined types were placed in this encounter. Prescriptions this encounter:  No orders of the defined types were placed in this encounter.       Electronically signed by:  Lana Hdz CNP

## 2021-07-13 NOTE — PROGRESS NOTES
Medical Nutrition Therapy   Metabolic and Bariatric Surgery         Supervised diet and exercise preparation  Visit 2 out of 3  Pt reports:      Changes in eating patterns to promote health are noted below on the goals number 22-25    Vitals: Wt Readings from Last 3 Encounters:   07/13/21 (!) 373 lb (169.2 kg)   07/02/21 (!) 378 lb 4 oz (171.6 kg)   06/16/21 (!) 375 lb (170.1 kg)         Nutrition Assessment:   PES: Knowledge deficit related to healthy behaviors that support weight management post weight loss surgery as evidenced by Body mass index is 66.07 kg/m². Nutrition Assessment of Goal Attainment:  TREATMENT GOALS:    1. Pt  Completed 2 out of 3 goals. 2.TREATMENT GOALS FOR UPCOMING WEEK: continue all previous goals and add: # 15 21 & 22    All goals were planned with and agreed on by the patient. I want to improve my health because Live a long and healthy life. appt # NA G What is your next step? C 1 2 3 4 5 6 7 8 9     0  one I will read the education binder provided to me and the   x 100               2 I will make my pschological evaluation appoinment. x              2  3 I will bring this goal card to every appointment. 100 100             x 4 I will eliminate all tobacco/nicotine. x 5 I will limit alcoholic beverages to 3-3IT per week. x 6 I will limit dining out to 3 times per week or less. x 7 I will eliminate sugary beverages. x 8 I will eliminate carbonated beverages. x 9 I will eliminate drinking with a straw. x 10 I will limit caffeinated beverages to 16oz daily. 11 I will limit cold cereals prepared with milk. 12 I will do a 5 minute reflection. 13 I will food journal daily. 2  14 I will log my exercise daily. 100 100              15 I will determine my  calcium and multivitamin plan.                  16 I will purchase multivitamin. 17 I will start taking multivitamins following my plan. 18 I will have 1-2 servings of protein present at each meal. x                19 I will eat every 3-5 hours. x               x 20 I will drink 64oz of fluid daily. 2  21 I will follow the 15-30-15 guideline. 2  22 I will eat protein first at all meals followed by vegetables  Fruit and lastly whole grains. 2  23 My first one diet neutral approach is:  I will continue to portion control. 100              24 my second diet neutral approach is:                 25 My third diet neutral approach is:                                                                                                                             Do you understand your goals? y    Do you have the information you need to achieve your goals? y    Do you have any questions  right now? n        [x]  Consistent goal achievement in the program thus far and further success with goals is expected. []  Unable to consistently make progress in goal achievement. At this time patient is not moving forward  in developing the skills needed for success after surgery. Plan:    Continue to follow monthly and review goals.          [x]  Nutrition visits complete    []

## 2021-08-06 ENCOUNTER — HOSPITAL ENCOUNTER (OUTPATIENT)
Age: 36
Discharge: HOME | End: 2021-08-06
Payer: MEDICARE

## 2021-08-06 DIAGNOSIS — E66.01 MORBID OBESITY WITH BMI OF 60.0-69.9, ADULT (HCC): ICD-10-CM

## 2021-08-06 DIAGNOSIS — E55.9 VITAMIN D DEFICIENCY: ICD-10-CM

## 2021-08-06 DIAGNOSIS — I10 ESSENTIAL HYPERTENSION: ICD-10-CM

## 2021-08-06 DIAGNOSIS — R73.03 PRE-DIABETES: ICD-10-CM

## 2021-08-06 DIAGNOSIS — E78.5 HYPERLIPIDEMIA, UNSPECIFIED HYPERLIPIDEMIA TYPE: ICD-10-CM

## 2021-08-06 LAB
MAGNESIUM: 2 MG/DL (ref 1.6–2.6)
PTH INTACT: 74.4 PG/ML (ref 15–65)

## 2021-08-06 PROCEDURE — 83735 ASSAY OF MAGNESIUM: CPT

## 2021-08-06 PROCEDURE — 84425 ASSAY OF VITAMIN B-1: CPT

## 2021-08-06 PROCEDURE — 83970 ASSAY OF PARATHORMONE: CPT

## 2021-08-06 PROCEDURE — 80307 DRUG TEST PRSMV CHEM ANLYZR: CPT

## 2021-08-06 PROCEDURE — 36415 COLL VENOUS BLD VENIPUNCTURE: CPT

## 2021-08-06 PROCEDURE — 84630 ASSAY OF ZINC: CPT

## 2021-08-06 PROCEDURE — 84590 ASSAY OF VITAMIN A: CPT

## 2021-08-10 LAB
RETINYL PALMITATE: 0.04 MG/L (ref 0–0.1)
VITAMIN A LEVEL: 0.4 MG/L (ref 0.3–1.2)
VITAMIN A, INTERP: NORMAL

## 2021-08-11 LAB
VITAMIN B1 WHOLE BLOOD: 86 NMOL/L (ref 70–180)
ZINC: 68.1 UG/DL (ref 60–120)

## 2021-08-16 ENCOUNTER — OFFICE VISIT (OUTPATIENT)
Dept: BARIATRICS/WEIGHT MGMT | Age: 36
End: 2021-08-16
Payer: MEDICARE

## 2021-08-16 ENCOUNTER — HOSPITAL ENCOUNTER (OUTPATIENT)
Dept: LAB | Age: 36
Setting detail: SPECIMEN
Discharge: HOME | End: 2021-08-16
Payer: MEDICARE

## 2021-08-16 VITALS
BODY MASS INDEX: 51.91 KG/M2 | HEIGHT: 63 IN | WEIGHT: 293 LBS | HEART RATE: 70 BPM | SYSTOLIC BLOOD PRESSURE: 120 MMHG | DIASTOLIC BLOOD PRESSURE: 72 MMHG

## 2021-08-16 DIAGNOSIS — E78.5 HYPERLIPIDEMIA, UNSPECIFIED HYPERLIPIDEMIA TYPE: ICD-10-CM

## 2021-08-16 DIAGNOSIS — Z01.818 PRE-OP TESTING: Primary | ICD-10-CM

## 2021-08-16 DIAGNOSIS — R73.03 PRE-DIABETES: ICD-10-CM

## 2021-08-16 DIAGNOSIS — E66.01 MORBID OBESITY WITH BMI OF 60.0-69.9, ADULT (HCC): ICD-10-CM

## 2021-08-16 DIAGNOSIS — I10 ESSENTIAL HYPERTENSION: Primary | ICD-10-CM

## 2021-08-16 LAB
SARS-COV-2: NORMAL
SARS-COV-2: NOT DETECTED
SOURCE: NORMAL

## 2021-08-16 PROCEDURE — 99213 OFFICE O/P EST LOW 20 MIN: CPT | Performed by: NURSE PRACTITIONER

## 2021-08-16 PROCEDURE — U0005 INFEC AGEN DETEC AMPLI PROBE: HCPCS

## 2021-08-16 PROCEDURE — 1036F TOBACCO NON-USER: CPT | Performed by: NURSE PRACTITIONER

## 2021-08-16 PROCEDURE — G8427 DOCREV CUR MEDS BY ELIG CLIN: HCPCS | Performed by: NURSE PRACTITIONER

## 2021-08-16 PROCEDURE — U0003 INFECTIOUS AGENT DETECTION BY NUCLEIC ACID (DNA OR RNA); SEVERE ACUTE RESPIRATORY SYNDROME CORONAVIRUS 2 (SARS-COV-2) (CORONAVIRUS DISEASE [COVID-19]), AMPLIFIED PROBE TECHNIQUE, MAKING USE OF HIGH THROUGHPUT TECHNOLOGIES AS DESCRIBED BY CMS-2020-01-R: HCPCS

## 2021-08-16 PROCEDURE — G8417 CALC BMI ABV UP PARAM F/U: HCPCS | Performed by: NURSE PRACTITIONER

## 2021-08-16 NOTE — PROGRESS NOTES
Medical Nutrition Therapy   Metabolic and Bariatric Surgery         Supervised diet and exercise preparation  Visit 3 out of 3  Pt reports:  No concerns     Changes in eating patterns to promote health are noted below on the goals number 22-25    Vitals: Wt Readings from Last 3 Encounters:   08/16/21 (!) 375 lb (170.1 kg)   07/13/21 (!) 373 lb (169.2 kg)   07/02/21 (!) 378 lb 4 oz (171.6 kg)         Nutrition Assessment:   PES: Knowledge deficit related to healthy behaviors that support weight management post weight loss surgery as evidenced by Body mass index is 66.43 kg/m². Nutrition Assessment of Goal Attainment:  TREATMENT GOALS:    1. Pt  Completed 4 out of 6 goals. 2.TREATMENT GOALS FOR UPCOMING WEEK: continue all previous goals and add: # 0    All goals were planned with and agreed on by the patient. I want to improve my health because Live a long and healthy life. appt # NA G What is your next step? C 1 2 3 4 5 6 7 8 9     0  one I will read the education binder provided to me and the   x 100               2 I will make my pschological evaluation appoinment. x              3  3 I will bring this goal card to every appointment. 100 100 100            x 4 I will eliminate all tobacco/nicotine. x 5 I will limit alcoholic beverages to 0-4MO per week. x 6 I will limit dining out to 3 times per week or less. x 7 I will eliminate sugary beverages. x 8 I will eliminate carbonated beverages. x 9 I will eliminate drinking with a straw. x 10 I will limit caffeinated beverages to 16oz daily. 11 I will limit cold cereals prepared with milk. 12 I will do a 5 minute reflection. 13 I will food journal daily. 3  14 I will log my exercise daily. 100 100 100           2  15 I will determine my  calcium and multivitamin plan.  x   100            x 16 I will purchase multivitamin. x 17 I will start taking multivitamins following my plan. 18 I will have 1-2 servings of protein present at each meal. x                19 I will eat every 3-5 hours. x               x 20 I will drink 64oz of fluid daily. 3  21 I will follow the 15-30-15 guideline. 75           2  22 I will eat protein first at all meals followed by vegetables  Fruit and lastly whole grains. x   100           3  23 My first one diet neutral approach is:  I will continue to portion control. 100 80             24 my second diet neutral approach is:                 25 My third diet neutral approach is:                                                                        Do you understand your goals? y    Do you have the information you need to achieve your goals? y    Do you have any questions  right now? n        [x]  Consistent goal achievement in the program thus far and further success with goals is expected. []  Unable to consistently make progress in goal achievement. At this time patient is not moving forward  in developing the skills needed for success after surgery. Plan:    Continue to follow monthly and review goals.          []  Nutrition visits complete    [x]

## 2021-08-16 NOTE — PROGRESS NOTES
Medical Weight Management Progress Note    Subjective      Patient being seen for medically supervised weight loss for the chronic conditions of HTN, HLD, Prediabetes. She is working on the behavior changes discussed at the initial appointment. Patient continues on diet plan. Physical activity includes walking. Weight gain of 2 lbs since last visit. Sleep study completed and no CPAP needed. Psych eval completed and pending report. EGD scheduled 21. No current issues. Working toward bariatric surgery:    [] Sleeve Gastrectomy                                                           [x] Michael-en-Y Gastric Bypass    Allergies: Allergies   Allergen Reactions    Vancomycin Itching       Past Medical History:     Past Medical History:   Diagnosis Date    Class 3 severe obesity due to excess calories without serious comorbidity with body mass index (BMI) of 60.0 to 69.9 in adult [facility]) 2019    Essential hypertension 2019    Hyperlipidemia 2016    Hyperlipidemia    .     Past Surgical History:  Past Surgical History:   Procedure Laterality Date     SECTION      INTRAUTERINE DEVICE INSERTION  2021    [address] [id number] Lot [id number] Exp 2023       Family History:  Family History   Problem Relation Age of Onset    Heart Disease Mother     Other Mother     Heart Disease Father     Other Father     Depression Mother     Diabetes Mother     High Blood Pressure Mother     Diabetes Maternal Grandmother     High Blood Pressure Maternal Grandmother     Kidney Disease Maternal Grandmother     Cancer Maternal Grandfather         prostate    Diabetes Maternal Grandfather     Heart Disease Maternal Grandfather     High Blood Pressure Maternal Grandfather     Cancer Paternal Grandmother         breast and brain       Social History:  Social History     Socioeconomic History    Marital status: Single     Spouse name: Not on file    Number of children: 1    Years of education: Not on file    Highest education level: Not on file   Occupational History    Not on file   Tobacco Use    Smoking status: Never Smoker    Smokeless tobacco: Never Used   Vaping Use    Vaping Use: Never used   Substance and Sexual Activity    Alcohol use: No    Drug use: No    Sexual activity: Not Currently   Other Topics Concern    Not on file   Social History Narrative    ** Merged History Encounter **          Social Determinants of Health     Financial Resource Strain:     Difficulty of Paying Living Expenses:    Food Insecurity:     Worried About Running Out of Food in the Last Year:     Ran Out of Food in the Last Year:    Transportation Needs:     Lack of Transportation (Medical):      Lack of Transportation (Non-Medical):    Physical Activity:     Days of Exercise per Week:     Minutes of Exercise per Session:    Stress:     Feeling of Stress :    Social Connections:     Frequency of Communication with Friends and Family:     Frequency of Social Gatherings with Friends and Family:     Attends Yazidism Services:     Active Member of Clubs or Organizations:     Attends Club or Organization Meetings:     Marital Status:    Intimate Partner Violence:     Fear of Current or Ex-Partner:     Emotionally Abused:     Physically Abused:     Sexually Abused:        Current Medications:  Current Outpatient Medications   Medication Sig Dispense Refill    losartan-hydroCHLOROthiazide (HYZAAR) 100-25 MG per tablet Take 1 tablet by mouth daily 90 tablet 1    acetaminophen (TYLENOL) 500 MG tablet Take 2 tablets by mouth every 6 hours as needed for Pain 60 tablet 0    naproxen (NAPROSYN) 500 MG tablet Take 1 tablet by mouth 2 times daily 20 tablet 0    metoprolol succinate (TOPROL XL) 100 MG extended release tablet TAKE ONE TABLET BY MOUTH DAILY 90 tablet 1    fluticasone (FLONASE) 50 MCG/ACT nasal spray 1 spray by Each Nostril route daily 1 Bottle 0     Current Facility-Administered Medications   Medication Dose Route Frequency Provider Last Rate Last Admin    levonorgestrel (MIRENA) IUD 52 mg 1 each  1 each Intrauterine Once Clarita Vogt MD   1 each at 07/02/21 1144    levonorgestrel (MIRENA) IUD 52 mg 1 each  1 each Intrauterine Once Clarita Vogt MD   1 each at 01/05/21 1210       Vital Signs:  /72 (Site: Right Upper Arm, Position: Sitting, Cuff Size: Large Adult)   Pulse 70   Ht 5' 3\" (1.6 m)   Wt (!) 375 lb (170.1 kg)   BMI 66.43 kg/m²     BMI/Height/Weight:  Body mass index is 66.43 kg/m². Review of Systems - A review of systems was performed. All was negative unless otherwise documented in HPI. Constitutional: Negative for fever, chills and diaphoresis. HENT: Negative for hearing loss and trouble swallowing. Eyes: Negative for photophobia and visual disturbance. Respiratory: Negative for cough, shortness of breath and wheezing. Cardiovascular: Negative for chest pain and palpitations. Gastrointestinal: Negative for nausea, vomiting, abdominal pain, diarrhea, constipation, blood in stool and abdominal distention. Endocrine: Negative for polydipsia, polyphagia and polyuria. Genitourinary: Negative for dysuria, frequency, hematuria and difficulty urinating. Musculoskeletal: Negative for myalgias, joint swelling. Skin: Negative for pallor and rash. Neurological: Negative for dizziness, tremors, light-headedness and headaches. Psychiatric/Behavioral: Negative for sleep disturbance and dysphoric mood. Objective:      Physical Exam   Vital signs reviewed. General: Well-developed and well-nourished. No acute distress. Skin: Warm, dry and intact. HEENT: Normocephalic. EOMs intact. Conjunctivae normal. Neck supple. Cardiovascular: Normal rate, regular rhythm. Pulmonary/Chest: Normal effort. Lungs clear to auscultation. No rales, rhonchi or wheezing. Abdominal: Positive bowel sounds. Soft, nontender. Nondistended. Musculoskeletal: Movement x4. No edema. Neurological: Gait normal. Alert and oriented to person, place, and time. Psychiatric: Normal mood and affect. Speech and behavior normal. Judgment and thought content normal. Cognition and memory intact. Assessment:       Diagnosis Orders   1. Essential hypertension  Urine Drug Screen   2. Pre-diabetes  Urine Drug Screen   3. Hyperlipidemia, unspecified hyperlipidemia type  Urine Drug Screen   4. Morbid obesity with BMI of 60.0-69.9, adult [facility])  Urine Drug Screen       Plan:    Dietitian visit today. Patient was encouraged to journal all food intake. Keep calorie level at approximately 2371-7306. Protein intake is to be a minimum of 60-80 grams per day. Water drinking was encouraged with a goal of 64oz-128oz daily. Beverages to be calorie free except for milk. Every other beverage should be water. Avoid soda. Continue to increase level of physical activity. Encouraged use of exercise log. Labs from 8/6/21 reviewed and discussed with patient. PTH mildly elevated. UDS ordered per insurance mandate. Follow-up  Return in about 1 month (around 9/16/2021). Orders this encounter:  Orders Placed This Encounter   Procedures    Urine Drug Screen     Standing Status:   Future     Standing Expiration Date:   8/16/2022       Prescriptions this encounter:  No orders of the defined types were placed in this encounter.       Electronically signed by:  Azeem Dominguez CNP

## 2021-08-19 ENCOUNTER — ANESTHESIA EVENT (OUTPATIENT)
Dept: OPERATING ROOM | Age: 36
End: 2021-08-19
Payer: MEDICARE

## 2021-08-20 ENCOUNTER — ANESTHESIA (OUTPATIENT)
Dept: OPERATING ROOM | Age: 36
End: 2021-08-20
Payer: MEDICARE

## 2021-08-20 ENCOUNTER — HOSPITAL ENCOUNTER (OUTPATIENT)
Age: 36
Setting detail: OUTPATIENT SURGERY
Discharge: HOME | End: 2021-08-20
Attending: SURGERY | Admitting: SURGERY
Payer: MEDICARE

## 2021-08-20 VITALS
SYSTOLIC BLOOD PRESSURE: 140 MMHG | TEMPERATURE: 97.2 F | RESPIRATION RATE: 20 BRPM | HEIGHT: 63 IN | WEIGHT: 293 LBS | OXYGEN SATURATION: 100 % | DIASTOLIC BLOOD PRESSURE: 92 MMHG | HEART RATE: 74 BPM | BODY MASS INDEX: 51.91 KG/M2

## 2021-08-20 VITALS
OXYGEN SATURATION: 100 % | TEMPERATURE: 96.8 F | DIASTOLIC BLOOD PRESSURE: 85 MMHG | SYSTOLIC BLOOD PRESSURE: 170 MMHG | RESPIRATION RATE: 27 BRPM

## 2021-08-20 LAB — HCG, PREGNANCY URINE (POC): NEGATIVE

## 2021-08-20 PROCEDURE — 6360000002 HC RX W HCPCS: Performed by: NURSE ANESTHETIST, CERTIFIED REGISTERED

## 2021-08-20 PROCEDURE — 2580000003 HC RX 258: Performed by: ANESTHESIOLOGY

## 2021-08-20 PROCEDURE — 2500000003 HC RX 250 WO HCPCS: Performed by: NURSE ANESTHETIST, CERTIFIED REGISTERED

## 2021-08-20 PROCEDURE — 88305 TISSUE EXAM BY PATHOLOGIST: CPT

## 2021-08-20 PROCEDURE — 81025 URINE PREGNANCY TEST: CPT

## 2021-08-20 PROCEDURE — 43239 EGD BIOPSY SINGLE/MULTIPLE: CPT | Performed by: SURGERY

## 2021-08-20 PROCEDURE — 7100000011 HC PHASE II RECOVERY - ADDTL 15 MIN: Performed by: SURGERY

## 2021-08-20 PROCEDURE — 3609012400 HC EGD TRANSORAL BIOPSY SINGLE/MULTIPLE: Performed by: SURGERY

## 2021-08-20 PROCEDURE — 2709999900 HC NON-CHARGEABLE SUPPLY: Performed by: SURGERY

## 2021-08-20 PROCEDURE — 7100000010 HC PHASE II RECOVERY - FIRST 15 MIN: Performed by: SURGERY

## 2021-08-20 PROCEDURE — 3700000000 HC ANESTHESIA ATTENDED CARE: Performed by: SURGERY

## 2021-08-20 RX ORDER — GLYCOPYRROLATE 1 MG/5 ML
SYRINGE (ML) INTRAVENOUS PRN
Status: DISCONTINUED | OUTPATIENT
Start: 2021-08-20 | End: 2021-08-20 | Stop reason: SDUPTHER

## 2021-08-20 RX ORDER — PROPOFOL 10 MG/ML
INJECTION, EMULSION INTRAVENOUS PRN
Status: DISCONTINUED | OUTPATIENT
Start: 2021-08-20 | End: 2021-08-20 | Stop reason: SDUPTHER

## 2021-08-20 RX ORDER — MIDAZOLAM HYDROCHLORIDE 1 MG/ML
INJECTION INTRAMUSCULAR; INTRAVENOUS PRN
Status: DISCONTINUED | OUTPATIENT
Start: 2021-08-20 | End: 2021-08-20 | Stop reason: SDUPTHER

## 2021-08-20 RX ORDER — MEPERIDINE HYDROCHLORIDE 50 MG/ML
12.5 INJECTION INTRAMUSCULAR; INTRAVENOUS; SUBCUTANEOUS EVERY 5 MIN PRN
Status: DISCONTINUED | OUTPATIENT
Start: 2021-08-20 | End: 2021-08-20 | Stop reason: HOSPADM

## 2021-08-20 RX ORDER — SODIUM CHLORIDE, SODIUM LACTATE, POTASSIUM CHLORIDE, CALCIUM CHLORIDE 600; 310; 30; 20 MG/100ML; MG/100ML; MG/100ML; MG/100ML
INJECTION, SOLUTION INTRAVENOUS CONTINUOUS
Status: DISCONTINUED | OUTPATIENT
Start: 2021-08-20 | End: 2021-08-20 | Stop reason: HOSPADM

## 2021-08-20 RX ORDER — MORPHINE SULFATE 2 MG/ML
2 INJECTION, SOLUTION INTRAMUSCULAR; INTRAVENOUS EVERY 5 MIN PRN
Status: DISCONTINUED | OUTPATIENT
Start: 2021-08-20 | End: 2021-08-20 | Stop reason: HOSPADM

## 2021-08-20 RX ORDER — HYDRALAZINE HYDROCHLORIDE 20 MG/ML
10 INJECTION INTRAMUSCULAR; INTRAVENOUS EVERY 10 MIN PRN
Status: DISCONTINUED | OUTPATIENT
Start: 2021-08-20 | End: 2021-08-20 | Stop reason: HOSPADM

## 2021-08-20 RX ORDER — MIDAZOLAM HYDROCHLORIDE 2 MG/2ML
1 INJECTION, SOLUTION INTRAMUSCULAR; INTRAVENOUS ONCE
Status: DISCONTINUED | OUTPATIENT
Start: 2021-08-20 | End: 2021-08-20 | Stop reason: HOSPADM

## 2021-08-20 RX ORDER — SODIUM CHLORIDE 0.9 % (FLUSH) 0.9 %
10 SYRINGE (ML) INJECTION PRN
Status: DISCONTINUED | OUTPATIENT
Start: 2021-08-20 | End: 2021-08-20 | Stop reason: HOSPADM

## 2021-08-20 RX ORDER — SODIUM CHLORIDE 0.9 % (FLUSH) 0.9 %
10 SYRINGE (ML) INJECTION EVERY 12 HOURS SCHEDULED
Status: DISCONTINUED | OUTPATIENT
Start: 2021-08-20 | End: 2021-08-20 | Stop reason: HOSPADM

## 2021-08-20 RX ORDER — DIPHENHYDRAMINE HYDROCHLORIDE 50 MG/ML
12.5 INJECTION INTRAMUSCULAR; INTRAVENOUS
Status: DISCONTINUED | OUTPATIENT
Start: 2021-08-20 | End: 2021-08-20 | Stop reason: HOSPADM

## 2021-08-20 RX ORDER — SODIUM CHLORIDE 9 MG/ML
25 INJECTION, SOLUTION INTRAVENOUS PRN
Status: DISCONTINUED | OUTPATIENT
Start: 2021-08-20 | End: 2021-08-20 | Stop reason: HOSPADM

## 2021-08-20 RX ORDER — OXYCODONE HYDROCHLORIDE AND ACETAMINOPHEN 5; 325 MG/1; MG/1
2 TABLET ORAL PRN
Status: DISCONTINUED | OUTPATIENT
Start: 2021-08-20 | End: 2021-08-20 | Stop reason: HOSPADM

## 2021-08-20 RX ORDER — KETAMINE HYDROCHLORIDE 10 MG/ML
INJECTION, SOLUTION INTRAMUSCULAR; INTRAVENOUS PRN
Status: DISCONTINUED | OUTPATIENT
Start: 2021-08-20 | End: 2021-08-20 | Stop reason: SDUPTHER

## 2021-08-20 RX ORDER — LABETALOL 20 MG/4 ML (5 MG/ML) INTRAVENOUS SYRINGE
10 EVERY 10 MIN PRN
Status: DISCONTINUED | OUTPATIENT
Start: 2021-08-20 | End: 2021-08-20 | Stop reason: HOSPADM

## 2021-08-20 RX ORDER — ONDANSETRON 2 MG/ML
4 INJECTION INTRAMUSCULAR; INTRAVENOUS
Status: DISCONTINUED | OUTPATIENT
Start: 2021-08-20 | End: 2021-08-20 | Stop reason: HOSPADM

## 2021-08-20 RX ORDER — 0.9 % SODIUM CHLORIDE 0.9 %
500 INTRAVENOUS SOLUTION INTRAVENOUS
Status: DISCONTINUED | OUTPATIENT
Start: 2021-08-20 | End: 2021-08-20 | Stop reason: HOSPADM

## 2021-08-20 RX ORDER — LIDOCAINE HYDROCHLORIDE 10 MG/ML
1 INJECTION, SOLUTION EPIDURAL; INFILTRATION; INTRACAUDAL; PERINEURAL
Status: DISCONTINUED | OUTPATIENT
Start: 2021-08-20 | End: 2021-08-20 | Stop reason: HOSPADM

## 2021-08-20 RX ORDER — LIDOCAINE HYDROCHLORIDE 20 MG/ML
INJECTION, SOLUTION INFILTRATION; PERINEURAL PRN
Status: DISCONTINUED | OUTPATIENT
Start: 2021-08-20 | End: 2021-08-20 | Stop reason: SDUPTHER

## 2021-08-20 RX ORDER — OXYCODONE HYDROCHLORIDE AND ACETAMINOPHEN 5; 325 MG/1; MG/1
1 TABLET ORAL PRN
Status: DISCONTINUED | OUTPATIENT
Start: 2021-08-20 | End: 2021-08-20 | Stop reason: HOSPADM

## 2021-08-20 RX ORDER — PROMETHAZINE HYDROCHLORIDE 25 MG/ML
6.25 INJECTION, SOLUTION INTRAMUSCULAR; INTRAVENOUS
Status: DISCONTINUED | OUTPATIENT
Start: 2021-08-20 | End: 2021-08-20 | Stop reason: HOSPADM

## 2021-08-20 RX ADMIN — PROPOFOL INJECTABLE EMULSION 50 MG: 10 INJECTION, EMULSION INTRAVENOUS at 08:43

## 2021-08-20 RX ADMIN — PROPOFOL INJECTABLE EMULSION 10 MG: 10 INJECTION, EMULSION INTRAVENOUS at 08:47

## 2021-08-20 RX ADMIN — PROPOFOL INJECTABLE EMULSION 20 MG: 10 INJECTION, EMULSION INTRAVENOUS at 08:44

## 2021-08-20 RX ADMIN — KETAMINE HYDROCHLORIDE 50 MG: 10 INJECTION INTRAMUSCULAR; INTRAVENOUS at 08:43

## 2021-08-20 RX ADMIN — Medication 0.2 MG: at 08:41

## 2021-08-20 RX ADMIN — MIDAZOLAM HYDROCHLORIDE 2 MG: 1 INJECTION, SOLUTION INTRAMUSCULAR; INTRAVENOUS at 08:41

## 2021-08-20 RX ADMIN — PROPOFOL INJECTABLE EMULSION 40 MG: 10 INJECTION, EMULSION INTRAVENOUS at 08:45

## 2021-08-20 RX ADMIN — SODIUM CHLORIDE, POTASSIUM CHLORIDE, SODIUM LACTATE AND CALCIUM CHLORIDE: 600; 310; 30; 20 INJECTION, SOLUTION INTRAVENOUS at 08:43

## 2021-08-20 RX ADMIN — LIDOCAINE HYDROCHLORIDE 100 MG: 20 INJECTION, SOLUTION INFILTRATION; PERINEURAL at 08:43

## 2021-08-20 ASSESSMENT — PAIN SCALES - GENERAL
PAINLEVEL_OUTOF10: 0

## 2021-08-20 ASSESSMENT — PULMONARY FUNCTION TESTS
PIF_VALUE: 1

## 2021-08-20 ASSESSMENT — PAIN - FUNCTIONAL ASSESSMENT: PAIN_FUNCTIONAL_ASSESSMENT: 0-10

## 2021-08-20 NOTE — H&P
Subjective     The patient is a [address] y.o. female who is here to undergo EGD for dysphagia with meals. She is considering a bariatric procedure for morbid obesity and BMI 66     Past Medical History:   Diagnosis Date    Class 3 severe obesity due to excess calories without serious comorbidity with body mass index (BMI) of 60.0 to 69.9 in adult [facility]) 2019    Essential hypertension 2019    Hyperlipidemia 2016    Hyperlipidemia    . Review of Systems - A complete 14 point review of systems was performed. All was negative unless otherwise documented in HPI. Allergies:   Allergies   Allergen Reactions    Vancomycin Itching       Past Surgical History:  Past Surgical History:   Procedure Laterality Date     SECTION      INTRAUTERINE DEVICE INSERTION  2021    [address] [id number] Lot [id number] Exp 2023       Family History:  Family History   Problem Relation Age of Onset    Heart Disease Mother     Other Mother     Heart Disease Father     Other Father     Depression Mother     Diabetes Mother     High Blood Pressure Mother     Diabetes Maternal Grandmother     High Blood Pressure Maternal Grandmother     Kidney Disease Maternal Grandmother     Cancer Maternal Grandfather         prostate    Diabetes Maternal Grandfather     Heart Disease Maternal Grandfather     High Blood Pressure Maternal Grandfather     Cancer Paternal Grandmother         breast and brain       Social History:  Social History     Socioeconomic History    Marital status: Single     Spouse name: Not on file    Number of children: 1    Years of education: Not on file    Highest education level: Not on file   Occupational History    Not on file   Tobacco Use    Smoking status: Never Smoker    Smokeless tobacco: Never Used   Vaping Use    Vaping Use: Never used   Substance and Sexual Activity    Alcohol use: No    Drug use: No    Sexual activity: Not Currently   Other Topics Concern    Not on file   Social History Narrative    ** Merged History Encounter **          Social Determinants of Health     Financial Resource Strain:     Difficulty of Paying Living Expenses:    Food Insecurity:     Worried About Running Out of Food in the Last Year:     [address] in the Last Year:    Transportation Needs:     Lack of Transportation (Medical):  Lack of Transportation (Non-Medical):    Physical Activity:     Days of Exercise per Week:     Minutes of Exercise per Session:    Stress:     Feeling of Stress :    Social Connections:     Frequency of Communication with Friends and Family:     Frequency of Social Gatherings with Friends and Family:     Attends Protestant Services:     Active Member of Clubs or Organizations:     Attends Club or Organization Meetings:     Marital Status:    Intimate Partner Violence:     Fear of Current or Ex-Partner:     Emotionally Abused:     Physically Abused:     Sexually Abused:        No current facility-administered medications for this encounter. Objective      Physical Exam    Constitutional:  Vital signs are normal. The patient appears well-developed and well-nourished. HEENT:   Head: Normocephalic. Atraumatic  Eyes: pupils are equal and reactive. No scleral icterus is present. Neck: No mass and no thyromegaly present. Cardiovascular: Normal rate, regular rhythm, S1 normal and S2 normal.    Pulmonary/Chest: Effort normal and breath sounds normal.   Abdominal: Soft. Normal appearance. There is no organomegaly. No tenderness. There is no rigidity, no rebound, no guarding and no Ortega's sign. Musculoskeletal:        Right lower leg: Normal. No tenderness and no edema. Left lower leg: Normal. No tenderness and no edema. Lymphadenopathy:     No cervical adenopathy, No Exrtemity Adenopathy. Neurological: The patient is alert and oriented. Skin: Skin is warm, dry and intact. Psychiatric: The patient has a normal mood and affect.

## 2021-08-20 NOTE — OP NOTE
Preoperative Diagnosis:  Dysphagia, Morbid obesity, BMI of Body mass index is 66.96 kg/m². Postoperative Diagnosis:   Dysphagia  Normal EGD other than mild gastritis  Morbid obesity, BMI of Body mass index is 66.96 kg/m². Procedure: Esophagogastroduodenoscopy with Biopsy    Surgeon: Kelly Green DO    Assistant:    Anesthesia: MAC, see anesthesia records    Specimen:    1) Antrum for H. Pylori    Findings:  No hiatal hernia    EBL: NONE    Operative Narrative: The risks and benefits were explained in detail to the patient who agreed and consented to the procedure. The patient was taken to the endoscopic suite and placed in a lateral position. Oxygen was administered via nasal cannula and a mouth guard was placed. MAC was administered via the anesthetic team.      The endoscope was then advanced into the oropharynx and down into the esophagus under direct visualization. The scope was further advanced through the esophagus, GE junction and stomach to the pylorus under visualization. The scope was passed through the pylorus and duodenal sweep performed, advancing and visualizing to the second portion of the duodenum. The scope was then withdrawn to the antrum and cold forceps were used to take biopsies of the antrum for H. Pylori. Appropriate hemostasis was noted. The scope was then retroflexed to visualize the GE junction. Evidence of a hiatal hernia was not noted. The scope was then slowly withdrawn through the GE junction. The Z line was noted. The stomach was then decompressed. The scope was withdrawn from the esophagus and no further lesions noted. The scope was withdrawn. The patient tolerated the procedure well. She will follow up with the [facility] for further assessment.

## 2021-08-20 NOTE — ANESTHESIA PRE PROCEDURE
Department of Anesthesiology  Preprocedure Note       Name:  Kely Frye   Age:  29 y.o.  :  1985                                          MRN:  [id number]         Date:  2021      Surgeon: Dru Espinosa):  Alyse Arteaga DO    Procedure: Procedure(s):  EGD BIOPSY    Medications prior to admission:   Prior to Admission medications    Medication Sig Start Date End Date Taking? Authorizing Provider   losartan-hydroCHLOROthiazide (HYZAAR) 100-25 MG per tablet Take 1 tablet by mouth daily 6/15/21  Yes Malini Rodriguez DO   acetaminophen (TYLENOL) 500 MG tablet Take 2 tablets by mouth every 6 hours as needed for Pain 21  Yes Farrah Dwyer MD   naproxen (NAPROSYN) 500 MG tablet Take 1 tablet by mouth 2 times daily 21  Yes Farrah Dwyer MD   metoprolol succinate (TOPROL XL) 100 MG extended release tablet TAKE ONE TABLET BY MOUTH DAILY 3/3/21  Yes Malini Rodriguez DO   fluticasone (FLONASE) 50 MCG/ACT nasal spray 1 spray by Each Nostril route daily 10/20/20  Yes Latoya Jasso MD       Current medications:    Current Facility-Administered Medications   Medication Dose Route Frequency Provider Last Rate Last Admin    lactated ringers infusion   Intravenous Continuous Josie Russo MD        sodium chloride flush 0.9 % injection 10 mL  10 mL Intravenous 2 times per day Josie Russo MD        sodium chloride flush 0.9 % injection 10 mL  10 mL Intravenous PRN Josie Russo MD        0.9 % sodium chloride infusion  25 mL Intravenous PRN Josie Russo MD        lidocaine PF 1 % injection 1 mL  1 mL Intradermal Once PRN Josie Russo MD           Allergies:     Allergies   Allergen Reactions    Vancomycin Itching       Problem List:    Patient Active Problem List   Diagnosis Code    Hyperlipidemia E78.5    Pre-diabetes R73.03    Morbid obesity with BMI of 60.0-69.9, adult (HCC) E66.01, Z68.44    Essential hypertension I10    Atypical squamous cells cannot exclude high grade squamous intraepithelial lesion on cytologic smear of cervix (ASC-H) R87.611    Vitamin D deficiency E55.9    IUD migration T83. 31XA       Past Medical History:        Diagnosis Date    Class 3 severe obesity due to excess calories without serious comorbidity with body mass index (BMI) of 60.0 to 69.9 in adult [facility]) 2019    Essential hypertension 2019    Hyperlipidemia 2016    Hyperlipidemia        Past Surgical History:        Procedure Laterality Date     SECTION      INTRAUTERINE DEVICE INSERTION  2021    [address] [id number] Lot [id number] Exp 2023       Social History:    Social History     Tobacco Use    Smoking status: Never Smoker    Smokeless tobacco: Never Used   Substance Use Topics    Alcohol use:  No                                Counseling given: Not Answered      Vital Signs (Current):   Vitals:    21 0818   Temp: 97.1 °F (36.2 °C)   TempSrc: Temporal                                              BP Readings from Last 3 Encounters:   21 120/72   21 118/74   21 134/85       NPO Status: Time of last liquid consumption: 645 (sip water )                        Time of last solid consumption:                         Date of last liquid consumption: 21                        Date of last solid food consumption: 21    BMI:   Wt Readings from Last 3 Encounters:   21 (!) 375 lb (170.1 kg)   21 (!) 373 lb (169.2 kg)   21 (!) 378 lb 4 oz (171.6 kg)     There is no height or weight on file to calculate BMI.    CBC:   Lab Results   Component Value Date    WBC 10.3 2021    RBC 4.17 2021    HGB 11.4 2021    HCT 34.1 2021    MCV 81.6 2021    RDW 14.3 2021     2021       CMP:   Lab Results   Component Value Date     2021    K 4.2 2021     2021    CO2 28 2021    BUN 16 2021    CREATININE 0.69 2021    GFRAA >60 05/23/2021    LABGLOM >60 05/23/2021    GLUCOSE 109 05/23/2021    PROT 7.5 05/23/2021    CALCIUM 9.2 05/23/2021    BILITOT <0.15 05/23/2021    ALKPHOS 84 05/23/2021    AST 18 05/23/2021    ALT 20 05/23/2021       POC Tests: No results for input(s): POCGLU, POCNA, POCK, POCCL, POCBUN, POCHEMO, POCHCT in the last 72 hours. Coags:   Lab Results   Component Value Date    PROTIME 13.0 05/23/2021    INR 1.0 05/23/2021       HCG (If Applicable):   Lab Results   Component Value Date    PREGTESTUR negative 10/13/2020    HCG NEGATIVE 04/06/2015        ABGs: No results found for: PHART, PO2ART, MRU3PSY, NEZ9KSX, BEART, Q3CYVUZI     Type & Screen (If Applicable):  No results found for: LABABO, LABRH    Drug/Infectious Status (If Applicable):  No results found for: HIV, HEPCAB    COVID-19 Screening (If Applicable):   Lab Results   Component Value Date    COVID19 Not Detected 08/16/2021           Anesthesia Evaluation  Patient summary reviewed and Nursing notes reviewed  Airway: Mallampati: IV  TM distance: >3 FB   Neck ROM: full  Mouth opening: > = 3 FB Dental: normal exam         Pulmonary:Negative Pulmonary ROS and normal exam                               Cardiovascular:    (+) hypertension:,                   Neuro/Psych:   Negative Neuro/Psych ROS              GI/Hepatic/Renal:   (+) morbid obesity          Endo/Other: Negative Endo/Other ROS                     ROS comment: -NPO AFTER MIDNIGHT  -ALLERGIES - VANCOMYCIN Abdominal:             Vascular: negative vascular ROS. Other Findings:             Anesthesia Plan      MAC     ASA 3       Induction: intravenous. MIPS: Postoperative opioids intended and Prophylactic antiemetics administered. Anesthetic plan and risks discussed with patient. Plan discussed with CRNA.     Attending anesthesiologist reviewed and agrees with Beauty Osler, MD   8/20/2021

## 2021-08-20 NOTE — ANESTHESIA POSTPROCEDURE EVALUATION
Department of Anesthesiology  Postprocedure Note    Patient: Rocío Saavedra  MRN: [id number]  YOB: 1985  Date of evaluation: 8/20/2021  Time:  8:56 AM     Procedure Summary     Date: 08/20/21 Room / Location: [address] [place] [address]    Anesthesia Start: 9609 Anesthesia Stop: 2963    Procedure: EGD BIOPSY (N/A ) Diagnosis: (K21.9 GERD / E66.01 OBESITY)    Surgeons: Jade Larsen DO Responsible Provider: Ursula Melgoza MD    Anesthesia Type: MAC ASA Status: 3          Anesthesia Type: MAC    Sher Phase I: Sher Score: 10    Sher Phase II: Sher Score: 9    Last vitals: Reviewed and per EMR flowsheets.        Anesthesia Post Evaluation    Patient location during evaluation: PACU  Patient participation: complete - patient participated  Level of consciousness: awake and alert  Airway patency: patent  Nausea & Vomiting: no nausea and no vomiting  Complications: no  Cardiovascular status: hemodynamically stable  Respiratory status: face mask and spontaneous ventilation  Hydration status: euvolemic

## 2021-08-24 LAB — SURGICAL PATHOLOGY REPORT: NORMAL

## 2021-10-21 ENCOUNTER — HOSPITAL ENCOUNTER (OUTPATIENT)
Age: 36
Discharge: HOME | End: 2021-10-21
Payer: MEDICARE

## 2021-10-21 DIAGNOSIS — R73.03 PRE-DIABETES: ICD-10-CM

## 2021-10-21 DIAGNOSIS — E78.5 HYPERLIPIDEMIA, UNSPECIFIED HYPERLIPIDEMIA TYPE: ICD-10-CM

## 2021-10-21 DIAGNOSIS — I10 ESSENTIAL HYPERTENSION: ICD-10-CM

## 2021-10-21 DIAGNOSIS — E66.01 MORBID OBESITY WITH BMI OF 60.0-69.9, ADULT (HCC): ICD-10-CM

## 2021-10-21 LAB
AMPHETAMINE SCREEN URINE: NEGATIVE
BARBITURATE SCREEN URINE: NEGATIVE
BENZODIAZEPINE SCREEN, URINE: NEGATIVE
BUPRENORPHINE URINE: NORMAL
CANNABINOID SCREEN URINE: NEGATIVE
COCAINE METABOLITE, URINE: NEGATIVE
MDMA URINE: NORMAL
METHADONE SCREEN, URINE: NEGATIVE
METHAMPHETAMINE, URINE: NORMAL
OPIATES, URINE: NEGATIVE
OXYCODONE SCREEN URINE: NEGATIVE
PHENCYCLIDINE, URINE: NEGATIVE
PROPOXYPHENE, URINE: NORMAL
TEST INFORMATION: NORMAL
TRICYCLIC ANTIDEPRESSANTS, UR: NORMAL

## 2021-10-21 PROCEDURE — 36415 COLL VENOUS BLD VENIPUNCTURE: CPT

## 2021-10-21 PROCEDURE — 80307 DRUG TEST PRSMV CHEM ANLYZR: CPT

## 2021-10-22 ENCOUNTER — OFFICE VISIT (OUTPATIENT)
Dept: BARIATRICS/WEIGHT MGMT | Age: 36
End: 2021-10-22
Payer: MEDICARE

## 2021-10-22 VITALS
HEART RATE: 70 BPM | SYSTOLIC BLOOD PRESSURE: 130 MMHG | WEIGHT: 293 LBS | DIASTOLIC BLOOD PRESSURE: 74 MMHG | HEIGHT: 63 IN | BODY MASS INDEX: 51.91 KG/M2

## 2021-10-22 DIAGNOSIS — E78.5 HYPERLIPIDEMIA, UNSPECIFIED HYPERLIPIDEMIA TYPE: ICD-10-CM

## 2021-10-22 DIAGNOSIS — I10 ESSENTIAL HYPERTENSION: Primary | ICD-10-CM

## 2021-10-22 DIAGNOSIS — R73.03 PRE-DIABETES: ICD-10-CM

## 2021-10-22 DIAGNOSIS — E66.01 MORBID OBESITY WITH BMI OF 60.0-69.9, ADULT (HCC): ICD-10-CM

## 2021-10-22 PROCEDURE — G8427 DOCREV CUR MEDS BY ELIG CLIN: HCPCS | Performed by: NURSE PRACTITIONER

## 2021-10-22 PROCEDURE — G8417 CALC BMI ABV UP PARAM F/U: HCPCS | Performed by: NURSE PRACTITIONER

## 2021-10-22 PROCEDURE — 1036F TOBACCO NON-USER: CPT | Performed by: NURSE PRACTITIONER

## 2021-10-22 PROCEDURE — G8484 FLU IMMUNIZE NO ADMIN: HCPCS | Performed by: NURSE PRACTITIONER

## 2021-10-22 PROCEDURE — 99213 OFFICE O/P EST LOW 20 MIN: CPT | Performed by: NURSE PRACTITIONER

## 2021-10-22 NOTE — PROGRESS NOTES
Medical Nutrition Therapy   Metabolic and Bariatric Surgery         Supervised diet and exercise preparation  Pt has completed nutrition goals      Pt reports:    What additional goals would you like to plan today? none        Vitals: Wt Readings from Last 3 Encounters:   10/22/21 (!) 378 lb (171.5 kg)   08/20/21 (!) 378 lb (171.5 kg)   08/16/21 (!) 375 lb (170.1 kg)         Nutrition Assessment:   PES: Knowledge deficit related to healthy behaviors that support weight management post weight loss surgery as evidenced by Body mass index is 66.96 kg/m². Nutrition Assessment of Goal Attainment:  TREATMENT GOALS:        I want to improve my health because Live a long and healthy life. appt # NA G What is your next step? C 1 2 3 4 5 6 7 8 9     0  one I will read the education binder provided to me and the   x 100               2 I will make my pschological evaluation appoinment. x              3  3 I will bring this goal card to every appointment. 100 100 100            x 4 I will eliminate all tobacco/nicotine. x 5 I will limit alcoholic beverages to 4-7JF per week. x 6 I will limit dining out to 3 times per week or less. x 7 I will eliminate sugary beverages. x 8 I will eliminate carbonated beverages. x 9 I will eliminate drinking with a straw. x 10 I will limit caffeinated beverages to 16oz daily. 11 I will limit cold cereals prepared with milk. 12 I will do a 5 minute reflection. 13 I will food journal daily. 3  14 I will log my exercise daily. 100 100 100           2  15 I will determine my  calcium and multivitamin plan. x   100            x 16 I will purchase multivitamin. x 17 I will start taking multivitamins following my plan.                  18 I will have 1-2 servings of protein present at each meal. x                19 I will eat every 3-5 hours. x               x 20 I will drink 64oz of fluid daily. 3  21 I will follow the 15-30-15 guideline. 75           2  22 I will eat protein first at all meals followed by vegetables  Fruit and lastly whole grains. x   100           3  23 My first one diet neutral approach is:  I will continue to portion control. 100 90             24 my second diet neutral approach is:                 25 My third diet neutral approach is:                                                                          Continue to follow monthly and review goals.          [x]  Nutrition goals complete    [x]

## 2021-10-22 NOTE — PROGRESS NOTES
Medical Weight Management Progress Note    Subjective       Patient being seen for medically supervised weight loss for the chronic conditions of HTN, HLD, Prediabetes. She is working on the behavior changes discussed at the initial appointment. Patient continues on diet plan. Physical activity includes walking. Weight gain of 3 lbs since last visit. Sleep study completed and no CPAP needed. Psych eval completed and clearance received. EGD completed and H Pylori negative. No current issues. Working toward bariatric surgery:    [x] Sleeve Gastrectomy                                                           [] Michael-en-Y Gastric Bypass    Allergies: Allergies   Allergen Reactions    Vancomycin Itching       Past Medical History:     Past Medical History:   Diagnosis Date    Class 3 severe obesity due to excess calories without serious comorbidity with body mass index (BMI) of 60.0 to 69.9 in adult [facility]) 2019    Essential hypertension 2019    Hyperlipidemia 2016    Hyperlipidemia    .     Past Surgical History:  Past Surgical History:   Procedure Laterality Date     SECTION      ENDOSCOPY, COLON, DIAGNOSTIC  2021    INTRAUTERINE DEVICE INSERTION  2021    [facility] [id number] Lot [id number] Exp 2023    UPPER GASTROINTESTINAL ENDOSCOPY N/A 2021    EGD BIOPSY performed by Neisha Painter DO at [address]       Family History:  Family History   Problem Relation Age of Onset    Heart Disease Mother     Other Mother     Heart Disease Father     Other Father     Depression Mother     Diabetes Mother     High Blood Pressure Mother     Diabetes Maternal Grandmother     High Blood Pressure Maternal Grandmother     Kidney Disease Maternal Grandmother     Cancer Maternal Grandfather         prostate    Diabetes Maternal Grandfather     Heart Disease Maternal Grandfather     High Blood Pressure Maternal Grandfather     Cancer Paternal Grandmother         breast and brain       Social History:  Social History     Socioeconomic History    Marital status: Single     Spouse name: Not on file    Number of children: 1    Years of education: Not on file    Highest education level: Not on file   Occupational History    Not on file   Tobacco Use    Smoking status: Never Smoker    Smokeless tobacco: Never Used   Vaping Use    Vaping Use: Never used   Substance and Sexual Activity    Alcohol use: No    Drug use: No    Sexual activity: Not Currently   Other Topics Concern    Not on file   Social History Narrative    ** Merged History Encounter **          Social Determinants of Health     Financial Resource Strain:     Difficulty of Paying Living Expenses:    Food Insecurity:     Worried About Running Out of Food in the Last Year:     Ran Out of Food in the Last Year:    Transportation Needs:     Lack of Transportation (Medical):      Lack of Transportation (Non-Medical):    Physical Activity:     Days of Exercise per Week:     Minutes of Exercise per Session:    Stress:     Feeling of Stress :    Social Connections:     Frequency of Communication with Friends and Family:     Frequency of Social Gatherings with Friends and Family:     Attends Druze Services:     Active Member of Clubs or Organizations:     Attends Club or Organization Meetings:     Marital Status:    Intimate Partner Violence:     Fear of Current or Ex-Partner:     Emotionally Abused:     Physically Abused:     Sexually Abused:        Current Medications:  Current Outpatient Medications   Medication Sig Dispense Refill    losartan-hydroCHLOROthiazide (HYZAAR) 100-25 MG per tablet Take 1 tablet by mouth daily 90 tablet 1    acetaminophen (TYLENOL) 500 MG tablet Take 2 tablets by mouth every 6 hours as needed for Pain 60 tablet 0    naproxen (NAPROSYN) 500 MG tablet Take 1 tablet by mouth 2 times daily 20 tablet 0    metoprolol succinate (TOPROL XL) 100 MG extended release tablet TAKE ONE TABLET BY MOUTH DAILY 90 tablet 1    fluticasone (FLONASE) 50 MCG/ACT nasal spray 1 spray by Each Nostril route daily 1 Bottle 0     Current Facility-Administered Medications   Medication Dose Route Frequency Provider Last Rate Last Admin    levonorgestrel (MIRENA) IUD 52 mg 1 each  1 each IntraUTERine Once Epi Stafford MD   1 each at 07/02/21 1144    levonorgestrel (MIRENA) IUD 52 mg 1 each  1 each IntraUTERine Once Epi Stafford MD   1 each at 01/05/21 1210       Vital Signs:  /74 (Site: Right Lower Arm, Position: Sitting, Cuff Size: Large Adult)   Pulse 70   Ht 5' 3\" (1.6 m)   Wt (!) 378 lb (171.5 kg)   BMI 66.96 kg/m²     BMI/Height/Weight:  Body mass index is 66.96 kg/m². Review of Systems - A review of systems was performed. All was negative unless otherwise documented in HPI. Constitutional: Negative for fever, chills and diaphoresis. HENT: Negative for hearing loss and trouble swallowing. Eyes: Negative for photophobia and visual disturbance. Respiratory: Negative for cough, shortness of breath and wheezing. Cardiovascular: Negative for chest pain and palpitations. Gastrointestinal: Negative for nausea, vomiting, abdominal pain, diarrhea, constipation, blood in stool and abdominal distention. Endocrine: Negative for polydipsia, polyphagia and polyuria. Genitourinary: Negative for dysuria, frequency, hematuria and difficulty urinating. Musculoskeletal: Negative for myalgias, joint swelling. Skin: Negative for pallor and rash. Neurological: Negative for dizziness, tremors, light-headedness and headaches. Psychiatric/Behavioral: Negative for sleep disturbance and dysphoric mood. Objective:      Physical Exam   Vital signs reviewed. General: Well-developed and well-nourished. No acute distress. Skin: Warm, dry and intact. HEENT: Normocephalic. EOMs intact. Conjunctivae normal. Neck supple.   Cardiovascular: Normal rate, regular rhythm. Pulmonary/Chest: Normal effort. Lungs clear to auscultation. No rales, rhonchi or wheezing. Abdominal: Positive bowel sounds. Soft, nontender. Nondistended. Musculoskeletal: Movement x4. No edema. Neurological: Gait normal. Alert and oriented to person, place, and time. Psychiatric: Normal mood and affect. Speech and behavior normal. Judgment and thought content normal. Cognition and memory intact. Assessment:       Diagnosis Orders   1. Essential hypertension     2. Morbid obesity with BMI of 60.0-69.9, adult ([facility] [address]     3. Pre-diabetes     4. Hyperlipidemia, unspecified hyperlipidemia type         Plan:    Dietitian visit today. Patient was encouraged to journal all food intake. Keep calorie level at approximately 2561-8658. Protein intake is to be a minimum of 60-80 grams per day. Water drinking was encouraged with a goal of 64oz-128oz daily. Beverages to be calorie free except for milk. Every other beverage should be water. Avoid soda. Continue to increase level of physical activity. Encouraged use of exercise log. UDS negative. Follow-up  No follow-ups on file. Orders this encounter:  No orders of the defined types were placed in this encounter. Prescriptions this encounter:  No orders of the defined types were placed in this encounter.       Electronically signed by:  Riddhi Phelps CNP

## 2021-10-27 ENCOUNTER — TELEPHONE (OUTPATIENT)
Dept: BARIATRICS/WEIGHT MGMT | Age: 36
End: 2021-10-27

## 2021-10-27 NOTE — TELEPHONE ENCOUNTER
Patient's record has been submitted to the insurance company on 10/27/21 for authorization to schedule surgery. Instructions to patient: if patient calls for status on authorization to schedule surgery please instruct patient that it could take up to 30 days for an authorization. Once authorization is received the insurance specialists will contact the patient to schedule their procedure.       Program fee paid in full yes

## 2021-11-02 ENCOUNTER — OFFICE VISIT (OUTPATIENT)
Dept: OBGYN CLINIC | Age: 36
End: 2021-11-02
Payer: MEDICARE

## 2021-11-02 VITALS
SYSTOLIC BLOOD PRESSURE: 142 MMHG | BODY MASS INDEX: 51.91 KG/M2 | DIASTOLIC BLOOD PRESSURE: 91 MMHG | HEIGHT: 63 IN | WEIGHT: 293 LBS

## 2021-11-02 DIAGNOSIS — N92.1 BREAKTHROUGH BLEEDING WITH IUD: Primary | ICD-10-CM

## 2021-11-02 DIAGNOSIS — Z97.5 BREAKTHROUGH BLEEDING WITH IUD: Primary | ICD-10-CM

## 2021-11-02 PROCEDURE — G8427 DOCREV CUR MEDS BY ELIG CLIN: HCPCS | Performed by: OBSTETRICS & GYNECOLOGY

## 2021-11-02 PROCEDURE — 1036F TOBACCO NON-USER: CPT | Performed by: OBSTETRICS & GYNECOLOGY

## 2021-11-02 PROCEDURE — 99212 OFFICE O/P EST SF 10 MIN: CPT | Performed by: OBSTETRICS & GYNECOLOGY

## 2021-11-02 PROCEDURE — G8484 FLU IMMUNIZE NO ADMIN: HCPCS | Performed by: OBSTETRICS & GYNECOLOGY

## 2021-11-02 PROCEDURE — G8417 CALC BMI ABV UP PARAM F/U: HCPCS | Performed by: OBSTETRICS & GYNECOLOGY

## 2021-11-02 ASSESSMENT — ENCOUNTER SYMPTOMS
BACK PAIN: 0
SHORTNESS OF BREATH: 0
COUGH: 0
ABDOMINAL PAIN: 0

## 2021-11-02 NOTE — PROGRESS NOTES
[facility]  [facility] - [address] [facility]  Dept: [phone]  Dept Fax: [phone]    21    Chief Complaint   Patient presents with    Vaginal Bleeding     No period in  started bleeding 10/11/21 very heavy for 7 days, then lighter/spotting. Had intercourse 10/27 and bleeding picked back up to the 10/29       [facility] 29 y.o. has a complaint of of heavy bleeding in October. She had an IUD placed in 21. She had a regular period in July and August.  She had no period in September and then a heavy period for 7 days in October. She did have sex and then had bleeding for 2 days. She is not having bleeding now. She just wanted to make sure that everything was ok. Review of Systems   Constitutional: Negative for chills and fever. HENT: Negative for congestion. Respiratory: Negative for cough and shortness of breath. Cardiovascular: Negative for chest pain and palpitations. Gastrointestinal: Negative for abdominal pain. Genitourinary: Negative for dyspareunia, pelvic pain and vaginal discharge. Musculoskeletal: Negative for back pain. Neurological: Negative for dizziness and light-headedness. Psychiatric/Behavioral: The patient is not nervous/anxious. Gynecologic History  No LMP recorded. (Menstrual status: Other - See Notes).   Contraception: IUD  Last Pap: 20  Results: normal  Last Mammogram: n/a    Obstetric History  : 1  Para: 1  AB: 0    Past Medical History:   Diagnosis Date    Class 3 severe obesity due to excess calories without serious comorbidity with body mass index (BMI) of 60.0 to 69.9 in adult [facility]) 2019    Essential hypertension 2019    Hyperlipidemia 2016    Hyperlipidemia      Past Surgical History:   Procedure Laterality Date     SECTION      ENDOSCOPY, COLON, DIAGNOSTIC  2021    INTRAUTERINE DEVICE INSERTION  2021    [address] [id number] Lot [id number] Exp 1/2023    UPPER GASTROINTESTINAL ENDOSCOPY N/A 8/20/2021    EGD BIOPSY performed by Nicolette Knight DO at [address]   Allergen Reactions    Vancomycin Itching     Current Outpatient Medications   Medication Sig Dispense Refill    losartan-hydroCHLOROthiazide (HYZAAR) 100-25 MG per tablet Take 1 tablet by mouth daily 90 tablet 1    naproxen (NAPROSYN) 500 MG tablet Take 1 tablet by mouth 2 times daily 20 tablet 0    metoprolol succinate (TOPROL XL) 100 MG extended release tablet TAKE ONE TABLET BY MOUTH DAILY 90 tablet 1    fluticasone (FLONASE) 50 MCG/ACT nasal spray 1 spray by Each Nostril route daily 1 Bottle 0    acetaminophen (TYLENOL) 500 MG tablet Take 2 tablets by mouth every 6 hours as needed for Pain 60 tablet 0     Current Facility-Administered Medications   Medication Dose Route Frequency Provider Last Rate Last Admin    levonorgestrel (MIRENA) IUD 52 mg 1 each  1 each IntraUTERine Once Paresh Brewster MD   1 each at 07/02/21 1144    levonorgestrel (MIRENA) IUD 52 mg 1 each  1 each IntraUTERine Once Paresh Brewster MD   1 each at 01/05/21 1210     Social History     Socioeconomic History    Marital status: Single     Spouse name: Not on file    Number of children: 1    Years of education: Not on file    Highest education level: Not on file   Occupational History    Not on file   Tobacco Use    Smoking status: Never Smoker    Smokeless tobacco: Never Used   Vaping Use    Vaping Use: Never used   Substance and Sexual Activity    Alcohol use: No    Drug use: No    Sexual activity: Not Currently   Other Topics Concern    Not on file   Social History Narrative    ** Merged History Encounter **          Social Determinants of Health     Financial Resource Strain:     Difficulty of Paying Living Expenses:    Food Insecurity:     Worried About Running Out of Food in the Last Year:     Lyndon of Food in the Last Year: Transportation Needs:     Lack of Transportation (Medical):  Lack of Transportation (Non-Medical):    Physical Activity:     Days of Exercise per Week:     Minutes of Exercise per Session:    Stress:     Feeling of Stress :    Social Connections:     Frequency of Communication with Friends and Family:     Frequency of Social Gatherings with Friends and Family:     Attends Confucianism Services:     Active Member of Clubs or Organizations:     Attends Club or Organization Meetings:     Marital Status:    Intimate Partner Violence:     Fear of Current or Ex-Partner:     Emotionally Abused:     Physically Abused:     Sexually Abused:      Family History   Problem Relation Age of Onset    Heart Disease Mother     Other Mother     Heart Disease Father     Other Father     Depression Mother     Diabetes Mother     High Blood Pressure Mother     Diabetes Maternal Grandmother     High Blood Pressure Maternal Grandmother     Kidney Disease Maternal Grandmother     Cancer Maternal Grandfather         prostate    Diabetes Maternal Grandfather     Heart Disease Maternal Grandfather     High Blood Pressure Maternal Grandfather     Cancer Paternal Grandmother         breast and brain       Physical exam Physical Exam  Constitutional:       Appearance: Normal appearance. She is obese. Genitourinary:      Vagina and uterus normal.      Cervical friability present. IUD strings visualized. HENT:      Head: Normocephalic. Eyes:      Extraocular Movements: Extraocular movements intact. Pulmonary:      Effort: Pulmonary effort is normal.   Neurological:      Mental Status: She is alert and oriented to person, place, and time. Psychiatric:         Mood and Affect: Mood normal.         Behavior: Behavior normal.         Thought Content: Thought content normal.         Judgment: Judgment normal.         Assessment/Plan  1.  Breakthrough bleeding with IUD  - Discussed with pt that she has only had the IUD in for 4-5 months and it is pretty common to have some adjustment period in her bleeding.    - Will continue to monitor at this time.       Pt to follow up for annual exam  Rickie Sheldon MD  [address]

## 2021-11-23 ENCOUNTER — OFFICE VISIT (OUTPATIENT)
Dept: BARIATRICS/WEIGHT MGMT | Age: 36
End: 2021-11-23
Payer: MEDICARE

## 2021-11-23 VITALS
SYSTOLIC BLOOD PRESSURE: 120 MMHG | DIASTOLIC BLOOD PRESSURE: 86 MMHG | HEART RATE: 66 BPM | HEIGHT: 63 IN | BODY MASS INDEX: 51.91 KG/M2 | WEIGHT: 293 LBS

## 2021-11-23 DIAGNOSIS — R73.03 PRE-DIABETES: ICD-10-CM

## 2021-11-23 DIAGNOSIS — E66.01 MORBID OBESITY WITH BMI OF 60.0-69.9, ADULT (HCC): ICD-10-CM

## 2021-11-23 DIAGNOSIS — I10 ESSENTIAL HYPERTENSION: Primary | ICD-10-CM

## 2021-11-23 DIAGNOSIS — E78.5 HYPERLIPIDEMIA, UNSPECIFIED HYPERLIPIDEMIA TYPE: ICD-10-CM

## 2021-11-23 PROCEDURE — G8484 FLU IMMUNIZE NO ADMIN: HCPCS | Performed by: NURSE PRACTITIONER

## 2021-11-23 PROCEDURE — G8417 CALC BMI ABV UP PARAM F/U: HCPCS | Performed by: NURSE PRACTITIONER

## 2021-11-23 PROCEDURE — 1036F TOBACCO NON-USER: CPT | Performed by: NURSE PRACTITIONER

## 2021-11-23 PROCEDURE — G8427 DOCREV CUR MEDS BY ELIG CLIN: HCPCS | Performed by: NURSE PRACTITIONER

## 2021-11-23 PROCEDURE — 99213 OFFICE O/P EST LOW 20 MIN: CPT | Performed by: NURSE PRACTITIONER

## 2021-11-23 NOTE — PROGRESS NOTES
Medical Nutrition Therapy   Metabolic and Bariatric Surgery         Supervised diet and exercise preparation  Pt has completed nutrition goals      Pt reports:    What additional goals would you like to plan today? none      Vitals: Wt Readings from Last 3 Encounters:   11/23/21 (!) 378 lb (171.5 kg)   11/02/21 (!) 383 lb (173.7 kg)   10/22/21 (!) 378 lb (171.5 kg)         Nutrition Assessment:   PES: Knowledge deficit related to healthy behaviors that support weight management post weight loss surgery as evidenced by Body mass index is 66.96 kg/m². Nutrition Assessment of Goal Attainment:  TREATMENT GOALS:        Continue bariatric behaviors. Continue to follow monthly and review goals.          [x]  Nutrition goals complete    [x]

## 2021-11-23 NOTE — PROGRESS NOTES
Medical Weight Management Progress Note    Subjective       Patient being seen for medically supervised weight loss for the chronic conditions of HTN, HLD, Prediabetes. She is working on the behavior changes discussed at the initial appointment. Patient continues on diet plan. Physical activity includes walking. Weight loss of 0 lbs since last visit. Sleep study completed and no CPAP needed. Psych eval completed and clearance received. EGD completed and H Pylori negative. No current issues. Working toward bariatric surgery:    [x] Sleeve Gastrectomy                                                           [] Michael-en-Y Gastric Bypass    Allergies: Allergies   Allergen Reactions    Vancomycin Itching       Past Medical History:     Past Medical History:   Diagnosis Date    Class 3 severe obesity due to excess calories without serious comorbidity with body mass index (BMI) of 60.0 to 69.9 in adult [facility]) 2019    Essential hypertension 2019    Hyperlipidemia 2016    Hyperlipidemia    .     Past Surgical History:  Past Surgical History:   Procedure Laterality Date     SECTION      ENDOSCOPY, COLON, DIAGNOSTIC  2021    INTRAUTERINE DEVICE INSERTION  2021    [address] [id number] Lot [id number] Exp 2023    UPPER GASTROINTESTINAL ENDOSCOPY N/A 2021    EGD BIOPSY performed by Anju Gerardo DO at [address]       Family History:  Family History   Problem Relation Age of Onset    Heart Disease Mother     Other Mother     Heart Disease Father     Other Father     Depression Mother     Diabetes Mother     High Blood Pressure Mother     Diabetes Maternal Grandmother     High Blood Pressure Maternal Grandmother     Kidney Disease Maternal Grandmother     Cancer Maternal Grandfather         prostate    Diabetes Maternal Grandfather     Heart Disease Maternal Grandfather     High Blood Pressure Maternal Grandfather     Cancer Paternal Grandmother         breast and brain       Social History:  Social History     Socioeconomic History    Marital status: Single     Spouse name: Not on file    Number of children: 1    Years of education: Not on file    Highest education level: Not on file   Occupational History    Not on file   Tobacco Use    Smoking status: Never Smoker    Smokeless tobacco: Never Used   Vaping Use    Vaping Use: Never used   Substance and Sexual Activity    Alcohol use: No    Drug use: No    Sexual activity: Not Currently   Other Topics Concern    Not on file   Social History Narrative    ** Merged History Encounter **          Social Determinants of Health     Financial Resource Strain:     Difficulty of Paying Living Expenses: Not on file   Food Insecurity:     Worried About Running Out of Food in the Last Year: Not on file    Lyndon of Food in the Last Year: Not on file   Transportation Needs:     Lack of Transportation (Medical): Not on file    Lack of Transportation (Non-Medical):  Not on file   Physical Activity:     Days of Exercise per Week: Not on file    Minutes of Exercise per Session: Not on file   Stress:     Feeling of Stress : Not on file   Social Connections:     Frequency of Communication with Friends and Family: Not on file    Frequency of Social Gatherings with Friends and Family: Not on file    Attends Uatsdin Services: Not on file    Active Member of [address] [place] [address] [employer] or Organizations: Not on file    Attends Club or Organization Meetings: Not on file    Marital Status: Not on file   Intimate Partner Violence:     Fear of Current or Ex-Partner: Not on file    Emotionally Abused: Not on file    Physically Abused: Not on file    Sexually Abused: Not on file   Housing Stability:     Unable to Pay for Housing in the Last Year: Not on file    Number of [place] in the Last Year: Not on file    Unstable Housing in the Last Year: Not on file       Current Medications:  Current Outpatient Medications   Medication Sig Dispense Refill    losartan-hydroCHLOROthiazide (HYZAAR) 100-25 MG per tablet Take 1 tablet by mouth daily 90 tablet 1    acetaminophen (TYLENOL) 500 MG tablet Take 2 tablets by mouth every 6 hours as needed for Pain 60 tablet 0    naproxen (NAPROSYN) 500 MG tablet Take 1 tablet by mouth 2 times daily 20 tablet 0    metoprolol succinate (TOPROL XL) 100 MG extended release tablet TAKE ONE TABLET BY MOUTH DAILY 90 tablet 1    fluticasone (FLONASE) 50 MCG/ACT nasal spray 1 spray by Each Nostril route daily 1 Bottle 0     Current Facility-Administered Medications   Medication Dose Route Frequency Provider Last Rate Last Admin    levonorgestrel (MIRENA) IUD 52 mg 1 each  1 each IntraUTERine Once Dylon Hernadez MD   1 each at 07/02/21 1144    levonorgestrel (MIRENA) IUD 52 mg 1 each  1 each IntraUTERine Once Dylon Hernadez MD   1 each at 01/05/21 1210       Vital Signs:  /86 (Site: Right Lower Arm, Position: Sitting, Cuff Size: Large Adult)   Pulse 66   Ht 5' 3\" (1.6 m)   Wt (!) 378 lb (171.5 kg)   BMI 66.96 kg/m²     BMI/Height/Weight:  Body mass index is 66.96 kg/m². Review of Systems - A review of systems was performed. All was negative unless otherwise documented in HPI. Constitutional: Negative for fever, chills and diaphoresis. HENT: Negative for hearing loss and trouble swallowing. Eyes: Negative for photophobia and visual disturbance. Respiratory: Negative for cough, shortness of breath and wheezing. Cardiovascular: Negative for chest pain and palpitations. Gastrointestinal: Negative for nausea, vomiting, abdominal pain, diarrhea, constipation, blood in stool and abdominal distention. Endocrine: Negative for polydipsia, polyphagia and polyuria. Genitourinary: Negative for dysuria, frequency, hematuria and difficulty urinating. Musculoskeletal: Negative for myalgias, joint swelling. Skin: Negative for pallor and rash.    Neurological: Negative for dizziness, tremors, light-headedness and headaches. Psychiatric/Behavioral: Negative for sleep disturbance and dysphoric mood. Objective:      Physical Exam   Vital signs reviewed. General: Well-developed and well-nourished. No acute distress. Skin: Warm, dry and intact. HEENT: Normocephalic. EOMs intact. Conjunctivae normal. Neck supple. Cardiovascular: Normal rate, regular rhythm. Pulmonary/Chest: Normal effort. Lungs clear to auscultation. No rales, rhonchi or wheezing. Abdominal: Positive bowel sounds. Soft, nontender. Nondistended. Musculoskeletal: Movement x4. No edema. Neurological: Gait normal. Alert and oriented to person, place, and time. Psychiatric: Normal mood and affect. Speech and behavior normal. Judgment and thought content normal. Cognition and memory intact. Assessment:       Diagnosis Orders   1. Essential hypertension     2. Hyperlipidemia, unspecified hyperlipidemia type     3. Pre-diabetes     4. Morbid obesity with BMI of 60.0-69.9, adult [facility])         Plan:    Dietitian visit today. Patient was encouraged to journal all food intake. Keep calorie level at approximately 8140-6023. Protein intake is to be a minimum of 60-80 grams per day. Water drinking was encouraged with a goal of 64oz-128oz daily. Beverages to be calorie free except for milk. Every other beverage should be water. Avoid soda. Continue to increase level of physical activity. Encouraged use of exercise log. UDS negative. Follow-up  No follow-ups on file. Orders this encounter:  No orders of the defined types were placed in this encounter. Prescriptions this encounter:  No orders of the defined types were placed in this encounter.       Electronically signed by:  Allegra Reid CNP

## 2021-11-30 ENCOUNTER — TELEPHONE (OUTPATIENT)
Dept: BARIATRICS/WEIGHT MGMT | Age: 36
End: 2021-11-30

## 2021-11-30 NOTE — TELEPHONE ENCOUNTER
Patient's record has been submitted to the insurance company on 11/30/21 for authorization to schedule surgery. Instructions to patient: if patient calls for status on authorization to schedule surgery please instruct patient that it could take up to 30 days for an authorization. Once authorization is received the insurance specialists will contact the patient to schedule their procedure.       Program fee paid in full yes

## 2021-12-01 NOTE — TELEPHONE ENCOUNTER
All clinicals faxed to [facility]  Originally sent electronically on 10/27/21 - due to transmission error never received.

## 2021-12-23 ENCOUNTER — PATIENT MESSAGE (OUTPATIENT)
Dept: FAMILY MEDICINE CLINIC | Age: 36
End: 2021-12-23

## 2021-12-27 NOTE — TELEPHONE ENCOUNTER
From: Brit Jaramillo  To: Dr. Benson [place]: 12/23/2021 3:05 PM EST  Subject: Covid question     I tested positive for Covid do you have any advice on what I should take having shortness of breath headache fever body aches

## 2021-12-29 ENCOUNTER — TELEMEDICINE (OUTPATIENT)
Dept: FAMILY MEDICINE CLINIC | Age: 36
End: 2021-12-29
Payer: MEDICARE

## 2021-12-29 DIAGNOSIS — U07.1 COVID-19: Primary | ICD-10-CM

## 2021-12-29 PROCEDURE — 99213 OFFICE O/P EST LOW 20 MIN: CPT | Performed by: NURSE PRACTITIONER

## 2021-12-29 PROCEDURE — G8427 DOCREV CUR MEDS BY ELIG CLIN: HCPCS | Performed by: NURSE PRACTITIONER

## 2021-12-29 RX ORDER — FLUTICASONE PROPIONATE 110 UG/1
2 AEROSOL, METERED RESPIRATORY (INHALATION) 2 TIMES DAILY
Qty: 12 G | Refills: 0 | Status: SHIPPED | OUTPATIENT
Start: 2021-12-29 | End: 2022-01-24

## 2021-12-29 SDOH — ECONOMIC STABILITY: FOOD INSECURITY: WITHIN THE PAST 12 MONTHS, THE FOOD YOU BOUGHT JUST DIDN'T LAST AND YOU DIDN'T HAVE MONEY TO GET MORE.: NEVER TRUE

## 2021-12-29 SDOH — ECONOMIC STABILITY: FOOD INSECURITY: WITHIN THE PAST 12 MONTHS, YOU WORRIED THAT YOUR FOOD WOULD RUN OUT BEFORE YOU GOT MONEY TO BUY MORE.: NEVER TRUE

## 2021-12-29 ASSESSMENT — ENCOUNTER SYMPTOMS
SINUS PAIN: 0
DIARRHEA: 0
WHEEZING: 0
NAUSEA: 0
CHEST TIGHTNESS: 0
SINUS PRESSURE: 0
SHORTNESS OF BREATH: 1
COUGH: 1
SORE THROAT: 0

## 2021-12-29 ASSESSMENT — SOCIAL DETERMINANTS OF HEALTH (SDOH): HOW HARD IS IT FOR YOU TO PAY FOR THE VERY BASICS LIKE FOOD, HOUSING, MEDICAL CARE, AND HEATING?: NOT HARD AT ALL

## 2021-12-29 NOTE — PROGRESS NOTES
Niall Osullivan (:  1985) is a 29 y.o. female,Established patient, here for evaluation of the following chief complaint(s): Positive For Covid-19         ASSESSMENT/PLAN:  1. COVID-19  -     fluticasone (FLOVENT HFA) 110 MCG/ACT inhaler; Inhale 2 puffs into the lungs 2 times daily, Disp-12 g, R-0Normal  Contacted [facility], unable to schedule patient until next week, out of 10 day window. Contacted patient to update. Notified may be able to return to [facility] for infusion, if desired. Return if symptoms worsen or fail to improve. SUBJECTIVE/OBJECTIVE:  HPI    Has COVID. Went to ER on  night 21 for shortness of breath. Given albuterol inhaler, azithromycin, and tessalon. She is still short of breath, using albuterol often. Initially tested positive last 21. Agreeable to monoclonal antibody, if can be scheduled. Review of Systems   Constitutional: Positive for activity change and fatigue. Negative for appetite change, chills and fever. HENT: Positive for congestion. Negative for sinus pressure, sinus pain and sore throat. Respiratory: Positive for cough and shortness of breath. Negative for chest tightness and wheezing. Cardiovascular: Negative for chest pain and palpitations. Gastrointestinal: Negative for diarrhea and nausea. Musculoskeletal: Positive for myalgias. Neurological: Negative for dizziness.        Patient-Reported Vitals 2021   Patient-Reported Weight 378lbs   Patient-Reported Height 5'3   Patient-Reported Systolic 889   Patient-Reported Diastolic 80   Patient-Reported Pulse 77   Patient-Reported Temperature -   Patient-Reported SpO2 96        Physical Exam    [INSTRUCTIONS:  \"[x]\" Indicates a positive item  \"[]\" Indicates a negative item  -- DELETE ALL ITEMS NOT EXAMINED]    Constitutional: [] Appears well-developed and well-nourished [x] No apparent distress      [] Abnormal -   Ill-appearing    Mental status: [x] Alert and awake  [x] Oriented to person/place/time [x] Able to follow commands    [] Abnormal -     Eyes:   EOM    [x]  Normal    [] Abnormal -   Sclera  [x]  Normal    [] Abnormal -          Discharge [x]  None visible   [] Abnormal -     HENT: [x] Normocephalic, atraumatic  [] Abnormal -   [x] Mouth/Throat: Mucous membranes are moist    External Ears [x] Normal  [] Abnormal -    Neck: [x] No visualized mass [] Abnormal -     Pulmonary/Chest: [] Respiratory effort normal   [x] No visualized signs of difficulty breathing or respiratory distress        [] Abnormal - appears mildly SOB with exertion     Musculoskeletal:   [x] Normal gait with no signs of ataxia         [x] Normal range of motion of neck        [] Abnormal -     Neurological:        [x] No Facial Asymmetry (Cranial nerve 7 motor function) (limited exam due to video visit)          [x] No gaze palsy        [] Abnormal -          Skin:        [x] No significant exanthematous lesions or discoloration noted on facial skin         [] Abnormal -            Psychiatric:       [x] Normal Affect [] Abnormal -        [x] No Hallucinations    Other pertinent observable physical exam findings:-          On this date 12/29/2021 I have spent 15 minutes reviewing previous notes, test results and face to face (virtual) with the patient discussing the diagnosis and importance of compliance with the treatment plan as well as documenting on the day of the visitAshutosh Bolanos, was evaluated through a synchronous (real-time) audio-video encounter. The patient (or guardian if applicable) is aware that this is a billable service. Verbal consent to proceed has been obtained within the past 12 months. The visit was conducted pursuant to the emergency declaration under the [facility], [address] [place] [address] authority and the [employer] General Act.   Patient identification was verified, and a caregiver was present when appropriate. The patient was located in a state where the provider was credentialed to provide care. An electronic signature was used to authenticate this note.     --JULIETA Grossman - CNP

## 2021-12-30 ENCOUNTER — TELEPHONE (OUTPATIENT)
Dept: FAMILY MEDICINE CLINIC | Age: 36
End: 2021-12-30

## 2021-12-30 NOTE — TELEPHONE ENCOUNTER
Pt called in stating that she had a vv with felipa and she stated felipa wanted her to go to [facility] ER to get some antibody testing done. She stated that she went to get the testing sone she was unable to get it done and she wants to know what should she do any other suggestions.     Please advise

## 2022-01-03 ENCOUNTER — PATIENT MESSAGE (OUTPATIENT)
Dept: FAMILY MEDICINE CLINIC | Age: 37
End: 2022-01-03

## 2022-01-03 DIAGNOSIS — J18.9 PNEUMONIA DUE TO INFECTIOUS ORGANISM, UNSPECIFIED LATERALITY, UNSPECIFIED PART OF LUNG: Primary | ICD-10-CM

## 2022-01-03 NOTE — TELEPHONE ENCOUNTER
From: Thedford Najjar  To: Dr. Brunilda Grayson: 1/3/2022 8:29 AM EST  Subject: Pneumonia     I was wondering if I can get a order for a chest X-ray to see if the pneumonia has cleared Im no longer on the 5 day antibiotics the er put me on Im still having some coughing and sometimes short of breath

## 2022-01-11 ENCOUNTER — OFFICE VISIT (OUTPATIENT)
Dept: OBGYN CLINIC | Age: 37
End: 2022-01-11
Payer: MEDICARE

## 2022-01-11 ENCOUNTER — HOSPITAL ENCOUNTER (OUTPATIENT)
Age: 37
Setting detail: SPECIMEN
Discharge: HOME | End: 2022-01-11

## 2022-01-11 VITALS
HEART RATE: 76 BPM | BODY MASS INDEX: 51.91 KG/M2 | SYSTOLIC BLOOD PRESSURE: 147 MMHG | DIASTOLIC BLOOD PRESSURE: 86 MMHG | HEIGHT: 63 IN | WEIGHT: 293 LBS

## 2022-01-11 DIAGNOSIS — Z01.419 WELL WOMAN EXAM WITH ROUTINE GYNECOLOGICAL EXAM: Primary | ICD-10-CM

## 2022-01-11 PROCEDURE — 99395 PREV VISIT EST AGE 18-39: CPT | Performed by: OBSTETRICS & GYNECOLOGY

## 2022-01-11 PROCEDURE — G8484 FLU IMMUNIZE NO ADMIN: HCPCS | Performed by: OBSTETRICS & GYNECOLOGY

## 2022-01-11 ASSESSMENT — ENCOUNTER SYMPTOMS
BACK PAIN: 0
ABDOMINAL PAIN: 0
SHORTNESS OF BREATH: 0
COUGH: 0

## 2022-01-11 NOTE — PROGRESS NOTES
[address] [facility] Noreen Griffin  [address] [facility] [address]  Dept: [phone]    Chief complaint:   Chief Complaint   Patient presents with    Gynecologic Exam     Last pap 8/26/20 ASCUS + HPV        History Present Illness: Rupesh Valero is a 30 yo female who presents for her annual exam.  She did have COVID at the end of December with symptoms of cough and lightheadedness, but then she developed more SOB and went to the hospital.  She has a h/o pneumonia and it turned into pneumonia. She says she is feeling much better now. She got her 1st vaccine a few weeks after that. She is sexually active. She denies any dyspareunia. She says her periods are heavy though. She is on her period right now. She denies any vaginal discharge, and declines STI testing. She denies any bowel or bladder issues. Current Medications (OTC/Herbal):   Current Outpatient Medications   Medication Sig Dispense Refill    fluticasone (FLOVENT HFA) 110 MCG/ACT inhaler Inhale 2 puffs into the lungs 2 times daily 12 g 0    losartan-hydroCHLOROthiazide (HYZAAR) 100-25 MG per tablet Take 1 tablet by mouth daily 90 tablet 1    metoprolol succinate (TOPROL XL) 100 MG extended release tablet TAKE ONE TABLET BY MOUTH DAILY 90 tablet 1     Current Facility-Administered Medications   Medication Dose Route Frequency Provider Last Rate Last Admin    levonorgestrel (MIRENA) IUD 52 mg 1 each  1 each IntraUTERine Once Zeynep Mena MD   1 each at 07/02/21 1144    levonorgestrel (MIRENA) IUD 52 mg 1 each  1 each IntraUTERine Once Zeynep Mena MD   1 each at 01/05/21 1210     Allergies:    Allergies   Allergen Reactions    Vancomycin Itching     Past Medical History:   Past Medical History:   Diagnosis Date    Class 3 severe obesity due to excess calories without serious comorbidity with body mass index (BMI) of 60.0 to 69.9 in adult [facility]) 2/6/2019     Days of Exercise per Week: Not on file    Minutes of Exercise per Session: Not on file   Stress:     Feeling of Stress : Not on file   Social Connections:     Frequency of Communication with Friends and Family: Not on file    Frequency of Social Gatherings with Friends and Family: Not on file    Attends Zoroastrianism Services: Not on file    Active Member of Clubs or Organizations: Not on file    Attends Club or Organization Meetings: Not on file    Marital Status: Not on file   Intimate Partner Violence:     Fear of Current or Ex-Partner: Not on file    Emotionally Abused: Not on file    Physically Abused: Not on file    Sexually Abused: Not on file   Housing Stability:     Unable to Pay for Housing in the Last Year: Not on file    Number of [place] in the Last Year: Not on file    Unstable Housing in the Last Year: Not on file       Family History   Problem Relation Age of Onset    Heart Disease Mother     Other Mother     Heart Disease Father     Other Father     Depression Mother     Diabetes Mother     High Blood Pressure Mother     Diabetes Maternal Grandmother     High Blood Pressure Maternal Grandmother     Kidney Disease Maternal Grandmother     Cancer Maternal Grandfather         prostate    Diabetes Maternal Grandfather     Heart Disease Maternal Grandfather     High Blood Pressure Maternal Grandfather     Cancer Paternal Grandmother         breast and brain       Review of Systems:   Review of Systems   Constitutional: Negative for chills and fever. HENT: Negative for congestion. Respiratory: Negative for cough and shortness of breath. Cardiovascular: Negative for chest pain and palpitations. Gastrointestinal: Negative for abdominal pain. Genitourinary: Negative for pelvic pain and vaginal discharge. Musculoskeletal: Negative for back pain. Neurological: Negative for dizziness and light-headedness.    Psychiatric/Behavioral: The patient is not nervous/anxious. Physical exam:  vitals:  Height   5  ft    3 in,  Weight    382 lbs,   147/86 BP  Gen: alert, no apparent distress  HEENT:No pathologic skin lesions noted,NC/AT,PERRL, normal midline nontender thyroid   Lung Exam: Clear to auscultation in all fields bilaterally, without wheezes,rales or rhonchi. Cardiac Exam: Normal sinus rhythm andrate, without murmurs, rubs or gallops appreciated. Breast Exam: Symmetric without pathological skin changes, nontender without discrete suspicious masses palpated, supraclavicular or axillary adenopathy or nipple discharge noted. Abdominal Exam: Nontender to deep palpation without organomegaly, masses or CVAT appreciated, BS positive. No spinal deformation or tenderness. External Genitalia: Normal development without vulvar,vaginal or cervical lesions noted. Normal vaginal discharge, uterus anterior, 4-6 weeks without CMT. Moderate blood in the vault. Adnexa nontender without abnormal masses bilaterally. Rectal Exam: Omitted. Extremities: Nontender without clubbing, cyanosis or edema. F.R.O.M. Neurologic Exam: Grossly intact without noted sensorimotor deficits and oriented x 3. Assessment/Plan:   Unremarkable annual Gyn exam.    Cervical Cytology Evaluation begins at 24years old. If Negative Cytology, Follow-up screening per current guidelines. Mammograms every 1year. If 37 yo and last mammogram was negative. Hereditary Breast, Ovarian, Colon and Uterine Cancer screening done. Colonoscopy screening reviewed as well as onset for bone density testing. Birth control and barrier recommendations discussed. STD counseling and prevention reviewed. Routine health maintenance per patients PCP.   Pt to follow up for annual exam in 1 year    Zeynep Mena MD  [address]

## 2022-01-12 RX ORDER — LOSARTAN POTASSIUM AND HYDROCHLOROTHIAZIDE 25; 100 MG/1; MG/1
1 TABLET ORAL DAILY
Qty: 90 TABLET | Refills: 1 | Status: ON HOLD
Start: 2022-01-12 | End: 2022-03-08 | Stop reason: HOSPADM

## 2022-01-12 NOTE — TELEPHONE ENCOUNTER
Jacquie Montgomery is calling to request a refill on the following medication(s):    Last Visit Date (If Applicable):  6/2/7419    Next Visit Date:    Visit date not found    Medication Request:  Requested Prescriptions     Pending Prescriptions Disp Refills    losartan-hydroCHLOROthiazide (HYZAAR) 100-25 MG per tablet 90 tablet 1     Sig: Take 1 tablet by mouth daily

## 2022-01-14 LAB
HPV SAMPLE: NORMAL
HPV, GENOTYPE 16: NOT DETECTED
HPV, GENOTYPE 18: NOT DETECTED
HPV, HIGH RISK OTHER: NOT DETECTED
HPV, INTERPRETATION: NORMAL
SPECIMEN DESCRIPTION: NORMAL

## 2022-01-20 RX ORDER — SODIUM CHLORIDE, SODIUM LACTATE, POTASSIUM CHLORIDE, CALCIUM CHLORIDE 600; 310; 30; 20 MG/100ML; MG/100ML; MG/100ML; MG/100ML
1000 INJECTION, SOLUTION INTRAVENOUS CONTINUOUS
Status: CANCELLED | OUTPATIENT
Start: 2022-01-20

## 2022-01-24 ENCOUNTER — HOSPITAL ENCOUNTER (OUTPATIENT)
Dept: GENERAL RADIOLOGY | Age: 37
Discharge: HOME | End: 2022-01-26
Payer: MEDICARE

## 2022-01-24 ENCOUNTER — HOSPITAL ENCOUNTER (OUTPATIENT)
Dept: PREADMISSION TESTING | Age: 37
Discharge: HOME | End: 2022-01-28
Payer: MEDICARE

## 2022-01-24 ENCOUNTER — TELEPHONE (OUTPATIENT)
Dept: BARIATRICS/WEIGHT MGMT | Age: 37
End: 2022-01-24

## 2022-01-24 ENCOUNTER — NURSE ONLY (OUTPATIENT)
Dept: BARIATRICS/WEIGHT MGMT | Age: 37
End: 2022-01-24

## 2022-01-24 VITALS
OXYGEN SATURATION: 100 % | SYSTOLIC BLOOD PRESSURE: 133 MMHG | RESPIRATION RATE: 16 BRPM | TEMPERATURE: 97.6 F | HEIGHT: 63 IN | HEART RATE: 81 BPM | WEIGHT: 293 LBS | BODY MASS INDEX: 51.91 KG/M2 | DIASTOLIC BLOOD PRESSURE: 82 MMHG

## 2022-01-24 DIAGNOSIS — J18.9 PNEUMONIA DUE TO INFECTIOUS ORGANISM, UNSPECIFIED LATERALITY, UNSPECIFIED PART OF LUNG: ICD-10-CM

## 2022-01-24 LAB
ANION GAP SERPL CALCULATED.3IONS-SCNC: 12 MMOL/L (ref 9–17)
BUN BLDV-MCNC: 19 MG/DL (ref 6–20)
BUN/CREAT BLD: ABNORMAL (ref 9–20)
CALCIUM SERPL-MCNC: 8.7 MG/DL (ref 8.6–10.4)
CHLORIDE BLD-SCNC: 101 MMOL/L (ref 98–107)
CO2: 24 MMOL/L (ref 20–31)
CREAT SERPL-MCNC: 0.69 MG/DL (ref 0.5–0.9)
CYTOLOGY REPORT: NORMAL
GFR AFRICAN AMERICAN: >60 ML/MIN
GFR NON-AFRICAN AMERICAN: >60 ML/MIN
GFR SERPL CREATININE-BSD FRML MDRD: ABNORMAL ML/MIN/{1.73_M2}
GFR SERPL CREATININE-BSD FRML MDRD: ABNORMAL ML/MIN/{1.73_M2}
GLUCOSE BLD-MCNC: 120 MG/DL (ref 70–99)
HCT VFR BLD CALC: 34.1 % (ref 36.3–47.1)
HEMOGLOBIN: 10.6 G/DL (ref 11.9–15.1)
INR BLD: 1
MCH RBC QN AUTO: 24.8 PG (ref 25.2–33.5)
MCHC RBC AUTO-ENTMCNC: 31.1 G/DL (ref 28.4–34.8)
MCV RBC AUTO: 79.9 FL (ref 82.6–102.9)
NRBC AUTOMATED: 0 PER 100 WBC
PARTIAL THROMBOPLASTIN TIME: 26 SEC (ref 20.5–30.5)
PDW BLD-RTO: 13.8 % (ref 11.8–14.4)
PLATELET # BLD: 462 K/UL (ref 138–453)
PMV BLD AUTO: 10.1 FL (ref 8.1–13.5)
POTASSIUM SERPL-SCNC: 3.7 MMOL/L (ref 3.7–5.3)
PROTHROMBIN TIME: 10.3 SEC (ref 9.1–12.3)
RBC # BLD: 4.27 M/UL (ref 3.95–5.11)
SODIUM BLD-SCNC: 137 MMOL/L (ref 135–144)
WBC # BLD: 11.6 K/UL (ref 3.5–11.3)

## 2022-01-24 PROCEDURE — G0480 DRUG TEST DEF 1-7 CLASSES: HCPCS

## 2022-01-24 PROCEDURE — 85027 COMPLETE CBC AUTOMATED: CPT

## 2022-01-24 PROCEDURE — 85730 THROMBOPLASTIN TIME PARTIAL: CPT

## 2022-01-24 PROCEDURE — 71046 X-RAY EXAM CHEST 2 VIEWS: CPT

## 2022-01-24 PROCEDURE — 85610 PROTHROMBIN TIME: CPT

## 2022-01-24 PROCEDURE — 80048 BASIC METABOLIC PNL TOTAL CA: CPT

## 2022-01-24 PROCEDURE — 36415 COLL VENOUS BLD VENIPUNCTURE: CPT

## 2022-01-24 RX ORDER — HEPARIN SODIUM 5000 [USP'U]/ML
5000 INJECTION, SOLUTION INTRAVENOUS; SUBCUTANEOUS ONCE
Status: CANCELLED | OUTPATIENT
Start: 2022-01-24

## 2022-01-24 RX ORDER — ALBUTEROL SULFATE 90 UG/1
2 AEROSOL, METERED RESPIRATORY (INHALATION) EVERY 4 HOURS PRN
COMMUNITY
Start: 2021-12-26 | End: 2022-03-02

## 2022-01-24 NOTE — PROGRESS NOTES
Anesthesia Focused Assessment    Hx of anesthesia complications:  no  Family hx of anesthesia complications:  n      Prior + Covid-19 test? yes  Date: 12/26/2021  Symptoms: SOB, fatigue, cough, dizziness, diarrhea x 1 week  Complications: pneumonia finding on chest xray; no residual shortness of breath. Hospitalization required? no      STOP-BANG Sleep Apnea Questionnaire    SNORE loudly (heard through closed doors)? Yes  TIRED, fatigued, sleepy during daytime? No  OBSERVED stopping breathing during sleep? No  High blood PRESSURE or being treated? Yes    BMI over 35? Yes  AGE over 48? No  NECK circumference over 16\"? Yes  GENDER (male)? No             Total 4  High risk 5-8  Intermediate risk 3-4  Low risk 0-2    ----------------------------------------------------------------------------------------------------------------------  WARREN                              No  If yes, machine? DM1                                            No  DM2                   Prediabetes     Coronary Artery Disease      No  HTN         Yes  Defib/AICD/Pacemaker               No             Renal Failure                   No  If yes, on dialysis           Active smoker? No  Drinks alcohol? No  Illicit drugs? No  Dentition?        benign      Past Medical History:   Diagnosis Date    Class 3 severe obesity due to excess calories without serious comorbidity with body mass index (BMI) of 60.0 to 69.9 in adult [facility]) 02/06/2019    Essential hypertension 02/06/2019    Hyperlipidemia 05/08/2016    Hyperlipidemia     Pneumonia due to COVID-19 virus 12/2021    SOB, fatigue, cough, diarrhea, dizziness x 1 week; no hospitalization    Prediabetes     Under care of team 01/24/2022    PCP: Lul Genao/Macho, last visit-patient goes 1/26/2022 for clearance         Patient was evaluated in PAT & anesthesia guidelines were applied.    NPO guidelines, medication instructions and scheduled arrival time were reviewed with patient. Patient was sent for post PAT anesthesia interview for covid + history and evaluated by Dr. Jorge Acosta, who recommends delaying surgery 8-10 weeks from last day of symptoms, which was 1/3/2022. Note sent with RN to Dr. Jorge Acosta: exam findings of wheezing on exam, chest xray today pending.                                                                                                                       Anesthesia contacted:   no    Medical or cardiac clearance ordered: medical clearance pending; Dr. Jorge Acosta recommends patient see a pulmonologist.     JULIETA Armenta - CNP   1/24/22  11:54 AM

## 2022-01-24 NOTE — PROGRESS NOTES
Writer spoke with Theresa Nevarez at Dr Gabi Arreola office. Informed her of anesthesia, Dr Damon Chowdhury, recommendation to wait 8 weeks from last symptom of covid.  Pt stated symptoms ended 1-3-2022

## 2022-01-24 NOTE — H&P
History and Physical    Pt Name: Hellen Osman  MRN: [id number]  YOB: 1985  Date of evaluation: 2022  Primary Care Physician: Michela Banda DO    SUBJECTIVE:   History of Chief Complaint:    Hellen Osman is a 29 y.o. female who presents for PAT appointment. Patient reports she has been trying to lose weight for years, using many techniques including atkins diet, weight watchers, changing her diet in general, exercise, and using atipex. Patient reports she did lose weight with weight watchers, up to 50 lbs but was not sustained. Due to several comorbidities, she has opted for surgical intervention. Patient has been scheduled for XI ROBOTIC SLEEVE WITH EGD AND LIVER BIOPSY-GI SCHEDULED  Allergies  is allergic to vancomycin. Medications  Prior to Admission medications    Medication Sig Start Date End Date Taking? Authorizing Provider   losartan-hydroCHLOROthiazide (HYZAAR) 100-25 MG per tablet Take 1 tablet by mouth daily 22  Yes Malini Rodriguez DO   metoprolol succinate (TOPROL XL) 100 MG extended release tablet TAKE ONE TABLET BY MOUTH DAILY 3/3/21  Yes Malini Rodriguez,    albuterol sulfate  (90 Base) MCG/ACT inhaler Inhale 2 puffs into the lungs every 4 hours as needed 21   Historical Provider, MD     Past Medical History    has a past medical history of Class 3 severe obesity due to excess calories without serious comorbidity with body mass index (BMI) of 60.0 to 69.9 in [facility]), Essential hypertension, Hyperlipidemia, Hyperlipidemia, Pneumonia due to COVID-19 virus, and Under care of team.  Past Surgical History   has a past surgical history that includes  section; intrauterine device insertion (2021); Endoscopy, colon, diagnostic (2021); Upper gastrointestinal endoscopy (N/A, 2021); and [place] tooth extraction. Social History   reports that she has never smoked. She has never used smokeless tobacco.    reports no history of alcohol use. reports no history of drug use. Marital Status single  Children 1  Occupation LPN  Family History  Family Status   Relation Name Status    Mother  Alive    Father  Alive    MGM      MGF      PGM  Alive    PGF  Alive     family history includes Cancer in her maternal grandfather and paternal grandmother; Depression in her mother; Diabetes in her maternal grandfather, maternal grandmother, and mother; Heart Disease in her father, maternal grandfather, and mother; High Blood Pressure in her maternal grandfather, maternal grandmother, and mother; Kidney Disease in her maternal grandmother; Other in her father and mother. Review of Systems:  CONSTITUTIONAL:   negative for fevers, chills, fatigue and malaise    EYES:   negative for double vision, blurred vision and photophobia    HEENT:   negative for tinnitus, epistaxis and sore throat     RESPIRATORY:   negative for cough, shortness of breath, wheezing     CARDIOVASCULAR:   negative for chest pain, palpitations, syncope, edema     GASTROINTESTINAL:   negative for nausea, vomiting     GENITOURINARY:   negative for incontinence     MUSCULOSKELETAL:   negative for neck or back pain     NEUROLOGICAL:   Negative for weakness and tingling  negative for headaches and dizziness     PSYCHIATRIC:   negative for anxiety       OBJECTIVE:   VITALS:  height is 5' 3\" (1.6 m) and weight is 378 lb (171.5 kg) (abnormal). Her temporal temperature is 97.6 °F (36.4 °C). Her blood pressure is 133/82 and her pulse is 81. Her respiration is 16 and oxygen saturation is 100%. CONSTITUTIONAL:alert & oriented x 3, no acute distress. Calm and pleasant. SKIN:  Warm and dry, no rashes to exposed areas of skin. HEAD:  Normocephalic, atraumatic. EYES: PERRL. EOMs intact. EARS:  Intact and equal bilaterally. No edema or thickening, without lumps, lesions, or discharge. Hearing grossly WNL. NOSE:  Nares patent.   No rhinorrhea   MOUTH/THROAT:  Mucous membranes pink and moist, uvula midline, teeth appear to be intact. NECK:supple, no lymphadenopathy  LUNGS: Respirations even and non-labored. Clear to auscultation to RLL and RUL; inspiratory wheezing to CHEKO and LLL. CARDIOVASCULAR: Regular rate and rhythm, no murmurs/rubs/gallops   ABDOMEN: soft, rotund, non-distended, bowel sounds active x 4   EXTREMITIES: 2+ edema to bilateral lower extremities. No varicosities to bilateral lower extremities. NEUROLOGIC: CN II-XII are grossly intact. Gait is smooth, rhythmic and effortless. Testing:   EKG: on file from 12/30/2021  Labs pending: drawn 1/24/2022   Chest XRay:  1/24/2022  IMPRESSIONS:   Obesity.   PLANS:   XI ROBOTIC SLEEVE WITH EGD AND LIVER BIOPSY-GI SCHEDULED    JULIETA Ray - CNP  Electronically signed 1/24/2022 at 11:30 AM

## 2022-01-24 NOTE — TELEPHONE ENCOUNTER
Received call from Annia in PAT - patient was COVID positive on 12/23/21 and her last covid symptom was 1/3/22 - per anesthesia patient needs to wait 8 weeks after last symptom to have surgery. Patient is rescheduled for 3/7/22 @ 12:30 pm arrive at 10:30 am.    PAT 3/2/22 @ 9:00 am    Patient to start pre- op diet on 2/21/22. Spoke with patient and she verbalized understanding.

## 2022-01-27 LAB
3-OH-COTININE: <2 NG/ML
COTININE: <2 NG/ML
NICOTINE: <2 NG/ML

## 2022-02-01 ENCOUNTER — OFFICE VISIT (OUTPATIENT)
Dept: FAMILY MEDICINE CLINIC | Age: 37
End: 2022-02-01
Payer: MEDICARE

## 2022-02-01 VITALS
WEIGHT: 293 LBS | OXYGEN SATURATION: 100 % | SYSTOLIC BLOOD PRESSURE: 143 MMHG | HEIGHT: 63 IN | BODY MASS INDEX: 51.91 KG/M2 | HEART RATE: 84 BPM | DIASTOLIC BLOOD PRESSURE: 86 MMHG

## 2022-02-01 DIAGNOSIS — E66.01 CLASS 3 SEVERE OBESITY DUE TO EXCESS CALORIES WITHOUT SERIOUS COMORBIDITY WITH BODY MASS INDEX (BMI) OF 60.0 TO 69.9 IN ADULT (HCC): ICD-10-CM

## 2022-02-01 DIAGNOSIS — I10 ESSENTIAL HYPERTENSION: Primary | ICD-10-CM

## 2022-02-01 PROCEDURE — 1036F TOBACCO NON-USER: CPT | Performed by: FAMILY MEDICINE

## 2022-02-01 PROCEDURE — G8417 CALC BMI ABV UP PARAM F/U: HCPCS | Performed by: FAMILY MEDICINE

## 2022-02-01 PROCEDURE — G8427 DOCREV CUR MEDS BY ELIG CLIN: HCPCS | Performed by: FAMILY MEDICINE

## 2022-02-01 PROCEDURE — 99213 OFFICE O/P EST LOW 20 MIN: CPT | Performed by: FAMILY MEDICINE

## 2022-02-01 PROCEDURE — G8484 FLU IMMUNIZE NO ADMIN: HCPCS | Performed by: FAMILY MEDICINE

## 2022-02-01 ASSESSMENT — PATIENT HEALTH QUESTIONNAIRE - PHQ9
2. FEELING DOWN, DEPRESSED OR HOPELESS: 0
SUM OF ALL RESPONSES TO PHQ QUESTIONS 1-9: 0
1. LITTLE INTEREST OR PLEASURE IN DOING THINGS: 0
SUM OF ALL RESPONSES TO PHQ QUESTIONS 1-9: 0
SUM OF ALL RESPONSES TO PHQ9 QUESTIONS 1 & 2: 0
SUM OF ALL RESPONSES TO PHQ QUESTIONS 1-9: 0
SUM OF ALL RESPONSES TO PHQ QUESTIONS 1-9: 0

## 2022-02-01 NOTE — PROGRESS NOTES
January 6, 2021       Mike Ortega MD  [address] [facility] [address]  Via In Basket      Patient: Janelle Mata   YOB: 1950   Date of Visit: 1/6/2021       Dear Dr. Ortega:    Thank you for referring Janelle Mata to me for evaluation. Below are my notes for this visit with her.    If you have questions, please do not hesitate to call me. I look forward to following your patient along with you.      Sincerely,        Anil Jackson MD        CC: No Recipients  Anil Jackson MD  1/6/2021 11:18 AM  Sign when Signing Visit  [place] ENCOUNTER  ENT NEW PATIENT NOTE      CHIEF COMPLAINT:  Consultation (Ref by Mike Ortega MD, For Vertigo)       History of Present Illness    Janelle Mata is a 70 year old female seen today for vertigo.  She presented to the ER about 3 weeks ago with symptoms starting after getting out of bed.  She had CT of the head which was normal.  She has since noticed some waxing and waning vertigo particularly when turning to the right.  Last a few seconds but she can not have lingering “off-balance sensation” for the rest of the day.  She has no history of head trauma or ear surgery.  Denies any hearing changes.  She gets left-sided tinnitus when it is quiet for about 1 years time.    The patient denies weight loss, fever, vision change, and headache. All other pertinent positive and negative aspects of the review of systems are incorporated above.    Past Medical History    Colon Polyp                                     5/2005          Comment: adenomatous and hyperplastic polyp    Calculus of kidney                                              Comment: 2006 UPJ right stone, h/o lithotripsy in past,                passes granules    Allergic rhinitis, cause unspecified                            Comment: dust, pollen, dust mites; h/o testing    Osteoarthrosis, unspecified whether generalize*                 Comment: R and L knee, back  Obtained medical clearance for OR on 3/7/2022 left midfoot    PMH of                                                          Comment: CYP-2D6 gene duplication (ultra rapid                metabolizer for some pain                meds/anti-arrhythmics, anti-depressants,                anti-psychotics, b-blockers, narcotics     Osteopenia                                                    Plantar fasciitis                               2011            Comment: left foot    PMH of                                                          Comment: Gene [employer] pt has a list of pain meds that                work    Anosmia                                                         Comment: after a virus    Unequal leg length (acquired)                   10/19/2011    URIN TRACT INFECTION NOS                        8/8/2006      HEMANGIOMA SKIN                                 4/22/2009     Sprain of neck                                  8/5/2004        Comment: doi--[id number]    HYPERLIPIDEMIA NEC/NOS                          7/5/2006      Risk for coronary artery disease less than 10%* 02/2015         Comment: 6.9% 2019    Retinal and vitreous disorder                   2015            Comment: Fluid on retina    GERD (gastroesophageal reflux disease)                          Comment: gerd/dyspepsia    Exercise induced bronchospasm                   10/22/2018      Comment: allergies, winter    Past Surgical History  Past Surgical History:   Procedure Laterality Date   • Colonoscopy w biopsy  5/2005    Dr. Goddard   • Colonoscopy w biopsy  3/30/2012    Tubulovillous and Hyperplastic polyps - next exam die in 3 years - Dr Goddard   • Colonoscopy w biopsy  8/11/2015    Tubular adenoma polyps, Diverticulosis - next exam due in 3 years- Dr Goddard   • Colonoscopy w biopsy  07/20/2018    Tubular Adenoma polyps, Diverticulosis - next exam due in 5 years- Dr Goddard   • Foot/toes surgery proc unlisted  10/15    Ingrown toe nail removal   • Joint replacement     • Laparoscopy,  cholecystectomy  2015    Cholecystectomy, laparoscopic, [facility]   • Lithotripsy - gen'l class  age 51    [facility]   • Removal gallbladder     • Remove tonsils/adenoids,<14 y/o  age 9    [facility]   • Total knee replacement  6/15/09    Knee Replacement, right, [facility]   • Total knee replacement  14    [facility]       Medications  Current Medications    ALBUTEROL 108 (90 BASE) MCG/ACT INHALER    Inhale 2 puffs into the lungs every 4 hours as needed for Shortness of Breath or Wheezing (pre-exercise).    CETIRIZINE (ZYRTEC ALLERGY) 10 MG TABLET    Take 1 tablet by mouth daily.    CYCLOSPORINE (RESTASIS) 0.05 % OPHTHALMIC EMULSION    Place 1 drop into both eyes 2 times daily.     EPINEPHRINE (EPIPEN 2-KRISSY) 0.3 MG/0.3ML AUTO-INJECTOR    Inject 0.3 mLs into the muscle once for 1 dose.    ESTRADIOL (VAGIFEM) 10 MCG VAGINAL TABLET    Place 1 tablet vaginally nightly.    MECLIZINE (ANTIVERT) 25 MG TABLET    Take 1 tablet by mouth 3 times daily as needed for Dizziness.    ZOSTER VACCINE RECOMB ADJUVANTED (SHINGRIX) 50 MCG/0.5ML INJECTION    Inject 1 mL into the muscle 1 time. Repeat dose in 2 to 6 months (unless 1 dose already given), for a total of 2 doses.       Allergies  ALLERGIES:  Shellfish allergy   (food or med), Latex   (environmental), [place]   (food), Iodine   (environmental or med), Neosporin [neomycin-bacitracin-polymyxin], Sulfa antibiotics, and Trees    Family History  Family History   Problem Relation Age of Onset   • Cancer Mother         lung cancer, emphysema   • Heart Father          of MI at 85   • Diabetes Brother    • Myocardial Infarction Brother    • Allergies Other         many cousins w/allergies     There is no history of head and neck cancer, bleeding disorders, or reactions to anesthesia.     Social History  Social History     Tobacco Use   • Smoking status: Never Smoker   • Smokeless tobacco: Never Used   Substance Use Topics   • Alcohol  use: No     Alcohol/week: 0.0 standard drinks   • Drug use: No       PHYSICAL EXAM  Vital Signs:  weight is 88.9 kg. Her temperature is 96.8 °F (36 °C). Her blood pressure is 132/70 and her pulse is 75. Her oxygen saturation is 97%.      GENERAL: Patient is cooperative, non-toxic appearing, and in no acute distress.   SKIN: No overtly ulcerated, cellulitic, or indurated lesions of the head or neck.  HEAD: Normocephalic and atraumatic. Sinuses are non-tender to palpation.  EARS: The pinnas are well-formed. External auditory canals are non-stenotic without cerumen impaction bilaterally. Mastoids are nontender to palpation.  Tympanic membranes are intact bilaterally without evidence of effusion or active infection appreciated.   Javi-Hallpike testing showed geotropic rotary nystagmus and vertigo to the right.  EYES: Extraocular muscles are intact. The sclera and conjunctiva are normal. Pupils are equal and reactive to light without evidence of afferent pupillary defect. No ptosis is appreciated.   NOSE: The nasal dorsum is without scar or deformity. Anterior rhinoscopy reveals no mucopurulence or polyps are appreciated. The nasal airways appear patent.  ORAL CAVITY: Lips are normal.  No mucosal masses or lesions are appreciated. Dentition is normal. The floor of mouth is soft and the tongue has full range of motion. There is appropriate incisor opening without trismus.   OROPHARYNX: No mucosal masses or lesions are appreciated. The base of tongue is soft and the palate elevates symmetrically without draping. The uvula is midline.  NECK: The neck is soft and supple. No crepitus or masses are appreciated. The trachea is in midline.  LYMPHATIC: No appreciable cervical lymphadenopathy is present on palpation.   CARDIOVASCULAR: Bilateral upper extremities have 2+ peripheral pulses. The heart has a regular rate and rhythm. No peripheral cyanosis is appreciated.  RESPIRATORY: Breathing is non-labored without use of accessory  muscles. There is symmetric chest wall expansion.  NEUROLOGICAL: Cranial nerves II-VI, VIII-XII are grossly intact. The facial nerve (VII) has a House-Brackmann Grade 1 of 6 bilaterally. There is no evidence of nystagmus or gross asymmetry on exam.   PSYCHIATRIC: Alert and oriented ×3. Mood and affect are otherwise appropriate.         IMAGING STUDIES  CT head 12/17/2020:  FINDINGS:    No acute intracranial hemorrhage, mass effect or abnormal extra-axial fluid  collection.  Brain morphology and volume are normal for age.  No  significant white matter abnormality.  No CT evidence of an acute  territorial vascular insult, however if there is concern for acute ischemia  MRI follow-up could be considered.  The imaged paranasal sinuses are clear.   The mastoid air cells are clear.  The osseous structures are unremarkable.        IMPRESSION:      1.  No acute intracranial abnormality.  I personally reviewed the images.     PROCEDURE  PREPROCEDURE DIAGNOSIS: Right BPPV  POSTPROCEDURE DIAGNOSIS: Same  PROCEDURE: Canalith repositioning maneuver  DESCRIPTION: The patient had positive nystagmus and vertigo on [place]-Hallpike testing. From the supine position, the head was turned to the opposite side and she remained there for 30 seconds. The body was further rotated until she was in head down position. After 30 seconds she sat upright. She tolerated this well.      Audiogram from today shows on the right normal sloping to moderate rising to mild sensorineural hearing loss on the right.  On the left she has normal sloping to moderate sensorineural hearing loss on the left.  Word recognition is 100% bilaterally.  Tympanograms are type A.      ASSESSMENT  1. Benign paroxysmal positional vertigo of right ear    2. Sensorineural hearing loss (SNHL) of both ears      PLAN  Findings consistent with right-sided BPPV.  I performed a canalith repositioning maneuver and provided education on how to do that at home.  She has a very slight  asymmetry worse in the left ear in the high frequencies.  I recommend following up with an audiogram in 1 year to follow this or sooner if there are any hearing changes or worsening tinnitus.    Instructions provided as documented in the After Visit Summary.      The patient indicated understanding of the diagnosis and agreed with the plan of care.

## 2022-02-01 NOTE — PROGRESS NOTES
[facility]  [address] [place] [address] [address] MENG [address] [place] [address]  Dept: [phone]      Hellen Osman is a 29 y.o. female who presents today for follow up on her  medical conditions as noted below.       Chief Complaint   Patient presents with    Pre-op Exam       Patient Active Problem List:     Hyperlipidemia     Pre-diabetes     Morbid obesity with BMI of 60.0-69.9, adult ([facility]     Essential hypertension     Atypical squamous cells cannot exclude high grade squamous intraepithelial lesion on cytologic smear of cervix (ASC-H)     Vitamin D deficiency     IUD migration     Esophageal dysphagia     Antral gastritis     Past Medical History:   Diagnosis Date    Class 3 severe obesity due to excess calories without serious comorbidity with body mass index (BMI) of 60.0 to 69.9 in adult ([facility] 2019    Essential hypertension 2019    Hyperlipidemia 2016    Hyperlipidemia     Pneumonia due to COVID-19 virus 2021    SOB, fatigue, cough, diarrhea, dizziness x 1 week; no hospitalization    Prediabetes     Under care of team 2022    PCP: Lul Martinez/Macho, last visit-patient goes 2022 for clearance      Past Surgical History:   Procedure Laterality Date     SECTION      ENDOSCOPY, COLON, DIAGNOSTIC  2021    INTRAUTERINE DEVICE INSERTION  2021    [address] [id number] Lot [id number] Exp 2023    UPPER GASTROINTESTINAL ENDOSCOPY N/A 2021    EGD BIOPSY performed by Zachary Boyle DO at [facility]       Family History   Problem Relation Age of Onset    Heart Disease Mother     Other Mother     Heart Disease Father     Other Father     Depression Mother     Diabetes Mother     High Blood Pressure Mother     Diabetes Maternal Grandmother     High Blood Pressure Maternal Grandmother     Kidney Disease Maternal Grandmother     Cancer Maternal Grandfather         prostate    Diabetes Maternal Grandfather     Heart Disease Maternal Grandfather     High Blood Pressure Maternal Grandfather     Cancer Paternal Grandmother         breast and brain       Current Outpatient Medications   Medication Sig Dispense Refill    albuterol sulfate  (90 Base) MCG/ACT inhaler Inhale 2 puffs into the lungs every 4 hours as needed      losartan-hydroCHLOROthiazide (HYZAAR) 100-25 MG per tablet Take 1 tablet by mouth daily 90 tablet 1    metoprolol succinate (TOPROL XL) 100 MG extended release tablet TAKE ONE TABLET BY MOUTH DAILY 90 tablet 1     Current Facility-Administered Medications   Medication Dose Route Frequency Provider Last Rate Last Admin    levonorgestrel (MIRENA) IUD 52 mg 1 each  1 each IntraUTERine Once Marguerite Galvez MD   1 each at 07/02/21 1144    levonorgestrel (MIRENA) IUD 52 mg 1 each  1 each IntraUTERine Once Marguerite Galvez MD   1 each at 01/05/21 1210     ALLERGIES:    Allergies   Allergen Reactions    Vancomycin Itching     Patient felt like skin was on fire. Social History     Tobacco Use    Smoking status: Never Smoker    Smokeless tobacco: Never Used   Substance Use Topics    Alcohol use: No        LDL Cholesterol (mg/dL)   Date Value   03/06/2021 137 (H)     HDL (mg/dL)   Date Value   03/06/2021 51     BUN (mg/dL)   Date Value   01/24/2022 19     CREATININE (mg/dL)   Date Value   01/24/2022 0.69     Glucose (mg/dL)   Date Value   01/24/2022 120 (H)     Hemoglobin A1C (%)   Date Value   03/06/2021 6.2 (H)              Subjective:      HPI  He is being seen today for preoperative clearance prior to having gastric bypass surgery she has had all of her preoperative studies her surgery is March 7 she is having no new complaints or problems today    Review of Systems:     Constitutional: Negative for fever, appetite change and fatigue. Family social and medical history reviewed and unchanged     HENT: Negative. Negative for nosebleeds, trouble swallowing and neck pain. Eyes: Negative for photophobia and visual disturbance. Respiratory: Negative. Negative for chest tightness and shortness of breath. Cardiovascular: Negative. Negative for chest pain and leg swelling. Gastrointestinal: Negative. Negative for abdominal pain and blood in stool. Endocrine: Negative for cold intolerance and polyuria. Genitourinary: Negative for dysuria and hematuria. Musculoskeletal: Negative. Skin: Negative for rash. Allergic/Immunologic: Negative. Neurological: Negative. Negative for dizziness, weakness and numbness. Hematological: Negative. Negative for adenopathy. Does not bruise/bleed easily. Psychiatric/Behavioral: Negative for sleep disturbance, dysphoric mood and  decreased concentration. The patient is not nervous/anxious. Objective:     Physical Exam:     Nursing note and vitals reviewed. BP (!) 143/86   Pulse 84   Ht 5' 3\" (1.6 m)   Wt (!) 385 lb (174.6 kg)   LMP 01/07/2022   SpO2 100%   BMI 68.20 kg/m²   Constitutional: She is oriented to person, place, and time. She   appears well-developed and well-nourished. HENT:   Head: Normocephalic and atraumatic. Right Ear: External ear normal. Tympanic membrane is not erythematous. No middle ear effusion. Left Ear: External ear normal. Tympanic membrane is not erythematous. No middle ear effusion. Nose: No mucosal edema. Mouth/Throat: Oropharynx is clear and moist. No posterior oropharyngeal erythema. Eyes: Conjunctivae and EOM are normal. Pupils are equal, round, and reactive to light. Neck: Normal range of motion. Neck supple. No thyromegaly present. Cardiovascular: Normal rate, regular rhythm and normal heart sounds. No murmur heard. Pulmonary/Chest: Effort normal and breath sounds normal. She has no wheezes. Shehas no rales. Abdominal: Soft. Bowel sounds are normal. She exhibits no distension and no mass.   There is no tenderness. There is no rebound and no guarding. Genitourinary/Anorectal:deferred  Musculoskeletal: Normal range of motion. She exhibits no edema or tenderness. Lymphadenopathy: She has no cervical adenopathy. Neurological: She is alert and oriented to person, place, and time. She has normal reflexes. Skin: Skin is warm and dry. No rash noted. Psychiatric: She has a normal mood and affect. Her   behavior is normal.       Assessment:      1. Essential hypertension    2. Class 3 severe obesity due to excess calories without serious comorbidity with body mass index (BMI) of 60.0 to 69.9 in adult [facility])          Plan:      Call or return to clinic prn if these symptoms worsen or fail to improve as anticipated. I have reviewed the instructions with the patient, answering all questions to her satisfaction. Reviewed her preoperative studies everything is basically in normal range I find her to be at low preoperative risk and I am clearing her for surgery  Return if symptoms worsen or fail to improve. No orders of the defined types were placed in this encounter. No orders of the defined types were placed in this encounter.       Electronically signed by Carli Moreno DO on 2/1/2022 at 2:35 PM

## 2022-02-01 NOTE — LETTER
[facility] [address] [facility]  [address]  [place] [address] [facility] [address]  Dept: [phone]        To whom it may concern Re: Max Tsai         I have recently evaluated Alejandro Danny in preoperative clearance. I have her viewed all of her preoperative studies. I find her to be at low preoperative risk.   I am clearing her for surgery            Sincerely,         Teetee Awan, DO

## 2022-02-15 DIAGNOSIS — I10 ESSENTIAL HYPERTENSION: ICD-10-CM

## 2022-02-15 RX ORDER — METOPROLOL SUCCINATE 100 MG/1
TABLET, EXTENDED RELEASE ORAL
Qty: 30 TABLET | Refills: 0 | Status: SHIPPED | OUTPATIENT
Start: 2022-02-15 | End: 2022-03-18 | Stop reason: SDUPTHER

## 2022-02-15 NOTE — TELEPHONE ENCOUNTER
Pharm requested    Health Maintenance   Topic Date Due    Hepatitis C screen  Never done    Varicella vaccine (1 of 2 - 2-dose childhood series) Never done    COVID-19 Vaccine (1) Never done    Flu vaccine (1) 09/01/2021    A1C test (Diabetic or Prediabetic)  03/06/2022    Potassium monitoring  01/24/2023    Creatinine monitoring  01/24/2023    Depression Screen  02/01/2023    Cervical cancer screen  01/11/2027    DTaP/Tdap/Td vaccine (3 - Td or Tdap) 04/26/2028    HIV screen  Completed    Hepatitis A vaccine  Aged Out    Hepatitis B vaccine  Aged Out    Hib vaccine  Aged Out    Meningococcal (ACWY) vaccine  Aged Out    Pneumococcal 0-64 years Vaccine  Aged Out             (applicable per patient's age: Cancer Screenings, Depression Screening, Fall Risk Screening, Immunizations)    Hemoglobin A1C (%)   Date Value   03/06/2021 6.2 (H)   10/30/2019 5.7   10/05/2018 5.9     LDL Cholesterol (mg/dL)   Date Value   03/06/2021 137 (H)     AST (U/L)   Date Value   05/23/2021 18     ALT (U/L)   Date Value   05/23/2021 20     BUN (mg/dL)   Date Value   01/24/2022 19      (goal A1C is < 7)   (goal LDL is <100) need 30-50% reduction from baseline     BP Readings from Last 3 Encounters:   02/01/22 (!) 143/86   01/24/22 133/82   01/11/22 (!) 147/86    (goal /80)      All Future Testing planned in [facility]:  Lab Frequency Next Occurrence   Sleep Study with PAP Titration Once 06/17/2021   COVID-19 Once 08/16/2021   PAP Smear Once 02/11/2022       Next Visit Date:  Future Appointments   Date Time Provider Rhys Gutierrez   2/24/2022  4:00 PM Ashely Patel, DO bariatric todd [facility]   3/2/2022  9:00 AM [facility] PAT RM 1 [facility] PAT [facility]   3/17/2022 10:15 AM Ashelyángel Patel, DO bariatric todd [facility]   3/30/2022  8:30 AM Chaparrita Graham MD [facility]   4/8/2022 11:15 AM JULIETA Bey - CNP Weight Mgmt [facility]            Patient Active Problem List:     Hyperlipidemia     Pre-diabetes Morbid obesity with BMI of 60.0-69.9, adult (HCC)     Essential hypertension     Atypical squamous cells cannot exclude high grade squamous intraepithelial lesion on cytologic smear of cervix (ASC-H)     Vitamin D deficiency     IUD migration     Esophageal dysphagia     Antral gastritis

## 2022-02-24 ENCOUNTER — OFFICE VISIT (OUTPATIENT)
Dept: BARIATRICS/WEIGHT MGMT | Age: 37
End: 2022-02-24
Payer: MEDICARE

## 2022-02-24 VITALS
DIASTOLIC BLOOD PRESSURE: 82 MMHG | RESPIRATION RATE: 20 BRPM | HEIGHT: 63 IN | SYSTOLIC BLOOD PRESSURE: 126 MMHG | HEART RATE: 80 BPM | WEIGHT: 293 LBS | BODY MASS INDEX: 51.91 KG/M2

## 2022-02-24 DIAGNOSIS — E66.01 MORBID OBESITY WITH BMI OF 60.0-69.9, ADULT (HCC): ICD-10-CM

## 2022-02-24 DIAGNOSIS — I10 ESSENTIAL HYPERTENSION: Primary | ICD-10-CM

## 2022-02-24 PROCEDURE — 99213 OFFICE O/P EST LOW 20 MIN: CPT | Performed by: SURGERY

## 2022-02-24 PROCEDURE — G8417 CALC BMI ABV UP PARAM F/U: HCPCS | Performed by: SURGERY

## 2022-02-24 PROCEDURE — G8484 FLU IMMUNIZE NO ADMIN: HCPCS | Performed by: SURGERY

## 2022-02-24 PROCEDURE — G8427 DOCREV CUR MEDS BY ELIG CLIN: HCPCS | Performed by: SURGERY

## 2022-02-24 PROCEDURE — 1036F TOBACCO NON-USER: CPT | Performed by: SURGERY

## 2022-03-01 RX ORDER — SODIUM CHLORIDE, SODIUM LACTATE, POTASSIUM CHLORIDE, CALCIUM CHLORIDE 600; 310; 30; 20 MG/100ML; MG/100ML; MG/100ML; MG/100ML
1000 INJECTION, SOLUTION INTRAVENOUS CONTINUOUS
Status: CANCELLED | OUTPATIENT
Start: 2022-03-01

## 2022-03-02 ENCOUNTER — HOSPITAL ENCOUNTER (OUTPATIENT)
Dept: PREADMISSION TESTING | Age: 37
Discharge: HOME | End: 2022-03-06
Payer: MEDICARE

## 2022-03-02 VITALS
HEART RATE: 75 BPM | BODY MASS INDEX: 51.91 KG/M2 | TEMPERATURE: 97.7 F | SYSTOLIC BLOOD PRESSURE: 145 MMHG | HEIGHT: 63 IN | DIASTOLIC BLOOD PRESSURE: 80 MMHG | RESPIRATION RATE: 20 BRPM | WEIGHT: 293 LBS | OXYGEN SATURATION: 98 %

## 2022-03-02 LAB
ANION GAP SERPL CALCULATED.3IONS-SCNC: 14 MMOL/L (ref 9–17)
BUN BLDV-MCNC: 15 MG/DL (ref 6–20)
CALCIUM SERPL-MCNC: 9.3 MG/DL (ref 8.6–10.4)
CHLORIDE BLD-SCNC: 98 MMOL/L (ref 98–107)
CO2: 27 MMOL/L (ref 20–31)
CREAT SERPL-MCNC: 0.74 MG/DL (ref 0.5–0.9)
GFR AFRICAN AMERICAN: >60 ML/MIN
GFR NON-AFRICAN AMERICAN: >60 ML/MIN
GFR SERPL CREATININE-BSD FRML MDRD: ABNORMAL ML/MIN/{1.73_M2}
GLUCOSE BLD-MCNC: 111 MG/DL (ref 70–99)
HCT VFR BLD CALC: 36.4 % (ref 36.3–47.1)
HEMOGLOBIN: 11.2 G/DL (ref 11.9–15.1)
INR BLD: 1
MCH RBC QN AUTO: 24.4 PG (ref 25.2–33.5)
MCHC RBC AUTO-ENTMCNC: 30.8 G/DL (ref 28.4–34.8)
MCV RBC AUTO: 79.3 FL (ref 82.6–102.9)
NRBC AUTOMATED: 0 PER 100 WBC
PARTIAL THROMBOPLASTIN TIME: 25.4 SEC (ref 20.5–30.5)
PDW BLD-RTO: 14.2 % (ref 11.8–14.4)
PLATELET # BLD: 451 K/UL (ref 138–453)
PMV BLD AUTO: 10.4 FL (ref 8.1–13.5)
POTASSIUM SERPL-SCNC: 3.8 MMOL/L (ref 3.7–5.3)
PROTHROMBIN TIME: 10.3 SEC (ref 9.1–12.3)
RBC # BLD: 4.59 M/UL (ref 3.95–5.11)
SODIUM BLD-SCNC: 139 MMOL/L (ref 135–144)
WBC # BLD: 8.6 K/UL (ref 3.5–11.3)

## 2022-03-02 PROCEDURE — G0480 DRUG TEST DEF 1-7 CLASSES: HCPCS

## 2022-03-02 PROCEDURE — 36415 COLL VENOUS BLD VENIPUNCTURE: CPT

## 2022-03-02 PROCEDURE — 85730 THROMBOPLASTIN TIME PARTIAL: CPT

## 2022-03-02 PROCEDURE — 85027 COMPLETE CBC AUTOMATED: CPT

## 2022-03-02 PROCEDURE — 80048 BASIC METABOLIC PNL TOTAL CA: CPT

## 2022-03-02 PROCEDURE — 85610 PROTHROMBIN TIME: CPT

## 2022-03-02 RX ORDER — HEPARIN SODIUM 5000 [USP'U]/ML
5000 INJECTION, SOLUTION INTRAVENOUS; SUBCUTANEOUS ONCE
Status: CANCELLED | OUTPATIENT
Start: 2022-03-02

## 2022-03-02 NOTE — PROGRESS NOTES
Anesthesia Focused Assessment    Hx of anesthesia complications:  no  Family hx of anesthesia complications:  n      Prior + Covid-19 test? yes  Date: 12/26/2021  Symptoms: SOB, fatigue, cough, dizziness, diarrhea x 1 week  Complications: pneumonia finding on chest xray; no residual shortness of breath. Hospitalization required? No    Pt had PAT visit on 1/24/22- at that time she  was sent for post PAT anesthesia interview for covid + history and evaluated by Dr. Lashanda Rios, who recommended delaying surgery 8-10 weeks from last day of symptoms, which was 1/3/2022. EXAMINATION:   TWO XRAY VIEWS OF THE CHEST       1/24/2022 11:27 am       COMPARISON:   11/20/2018       HISTORY:   ORDERING SYSTEM PROVIDED HISTORY: preop   Reason for Exam: gastric sleeve       FINDINGS:   The lungs are without acute focal process.  No effusion or pneumothorax. The   cardiomediastinal silhouette is normal.  The osseous structures are intact   without acute process.           Impression   Unremarkable chest.       STOP-BANG Sleep Apnea Questionnaire    SNORE loudly (heard through closed doors)? Yes  TIRED, fatigued, sleepy during daytime? No  OBSERVED stopping breathing during sleep? No  High blood PRESSURE or being treated? Yes    BMI over 35? Yes  AGE over 48? No  NECK circumference over 16\"? Yes  GENDER (male)? No             Total 4  High risk 5-8  Intermediate risk 3-4  Low risk 0-2    ----------------------------------------------------------------------------------------------------------------------  WARREN                              No  If yes, machine? DM1                                            No  DM2                   Prediabetes, no Rx    Coronary Artery Disease      No  HTN         Yes  Defib/AICD/Pacemaker               No             Renal Failure                   No  If yes, on dialysis           Active smoker? No  Drinks alcohol? No  Illicit drugs? No  Dentition?        benign      Past Medical History:   Diagnosis Date    Class 3 severe obesity due to excess calories without serious comorbidity with body mass index (BMI) of 60.0 to 69.9 in adult [facility]) 02/06/2019    Essential hypertension 02/06/2019    managed by Dr. Gwynneth Mcburney Hyperlipidemia 05/08/2016    Hyperlipidemia     Pneumonia due to COVID-19 virus 12/26/2021    SOB, fatigue, cough, diarrhea, dizziness x 1 week; no hospitalization    Prediabetes     Under care of team 01/24/2022    PCP: Lul Ybarra/Macho, last visit-patient goes 1/26/2022 for clearance         Patient was evaluated in PAT & anesthesia guidelines were applied. NPO guidelines, medication instructions and scheduled arrival time were reviewed with patient. Anesthesia contacted:   No    Medical or cardiac clearance ordered: PCP clearance is on file and in epic from 2/1/22.      JULIETA UMAÑA CNP   1/24/22  10:30 AM

## 2022-03-02 NOTE — H&P
History and Physical    Pt Name: Vandana Sharma  MRN: [id number]  YOB: 1985  Date of evaluation: 3/2/2022  Primary Care Physician: Winston Fernandez DO    SUBJECTIVE:   History of Chief Complaint:    Vandana Sharma is a 29 y.o. female who presents for PAT appointment. Patient reports she has been trying to lose weight for years, using many techniques including atkins diet, weight watchers, changing her diet in general, exercise, and using atipex. Patient reports she did lose weight with weight watchers, up to 50 lbs but was not sustained. Due to several comorbidities, she has opted for surgical intervention. Patient has been scheduled for XI ROBOTIC SLEEVE WITH EGD AND LIVER BIOPSY-GI SCHEDULED. She had been through PAT process 1/24/22,and anesthesia evaluated her at that time d/t +covid in December 2021 and recommended delaying surgery 8-10 weeks as well as seeing a pulmonologist, so she presents for repeat PAT visit today. She reports she feels \"great\" and is without any cardiopulmonary complaints. She has since followed with her PCP who cleared the patient on 2/1/22 and patient states PCP felt without need to see a pulmonologist.   Allergies  is allergic to vancomycin. Medications  Prior to Admission medications    Medication Sig Start Date End Date Taking?  Authorizing Provider   metoprolol succinate (TOPROL XL) 100 MG extended release tablet TAKE ONE TABLET BY MOUTH DAILY 2/15/22  Yes Laurie Pizarro MD   losartan-hydroCHLOROthiazide (HYZAAR) 100-25 MG per tablet Take 1 tablet by mouth daily 1/12/22  Yes Winston Fernandez DO     Past Medical History    has a past medical history of Class 3 severe obesity due to excess calories without serious comorbidity with body mass index (BMI) of 60.0 to 69.9 in adult [facility]), Essential hypertension, Hyperlipidemia, Hyperlipidemia, Pneumonia due to COVID-19 virus, Prediabetes, and Under care of team.  Past Surgical History   has a past surgical history that includes  section; intrauterine device insertion (2021); Upper gastrointestinal endoscopy (N/A, 2021); [place] tooth extraction; and Endoscopy, colon, diagnostic (2021). Social History   reports that she has never smoked. She has never used smokeless tobacco.    reports no history of alcohol use. reports no history of drug use. Marital Status single  Children 1  Occupation LPN  Family History  Family Status   Relation Name Status    Mother  Alive    Father  Alive    MGM      MGF      PGM  Alive    PGF  Alive     family history includes Cancer in her maternal grandfather and paternal grandmother; Depression in her mother; Diabetes in her maternal grandfather, maternal grandmother, and mother; Heart Disease in her father, maternal grandfather, and mother; High Blood Pressure in her maternal grandfather, maternal grandmother, and mother; Kidney Disease in her maternal grandmother; Other in her father and mother. Review of Systems:  CONSTITUTIONAL:   negative for fevers, chills, fatigue and malaise    EYES:   negative for double vision, blurred vision and photophobia    HEENT:   negative for tinnitus, epistaxis and sore throat     RESPIRATORY:   negative for cough, shortness of breath, wheezing     CARDIOVASCULAR:   negative for chest pain, palpitations, syncope, edema     GASTROINTESTINAL:   negative for nausea, vomiting     GENITOURINARY:   negative for incontinence     MUSCULOSKELETAL:   negative for neck or back pain     NEUROLOGICAL:   Negative for weakness and tingling  negative for headaches and dizziness     PSYCHIATRIC:   negative for anxiety       OBJECTIVE:   VITALS:  height is 5' 3\" (1.6 m) and weight is 368 lb (166.9 kg) (abnormal). Her infrared temperature is 97.7 °F (36.5 °C). Her blood pressure is 145/80 (abnormal) and her pulse is 75. Her respiration is 20 and oxygen saturation is 98%. CONSTITUTIONAL:alert & oriented x 3, no acute distress.  Calm and pleasant. SKIN:  Warm and dry, no rashes to exposed areas of skin. HEAD:  Normocephalic, atraumatic. EYES: PERRL. EOMs intact. EARS:  Intact and equal bilaterally. No edema or thickening, without lumps, lesions, or discharge. Hearing grossly WNL. NOSE:  Nares patent. No rhinorrhea   MOUTH/THROAT:  Mucous membranes pink and moist, uvula midline, teeth appear to be intact. NECK:supple, no lymphadenopathy  LUNGS: Respirations even and non-labored. Clear to auscultation to all fields. No wheezing, no rales. CARDIOVASCULAR: Regular rate and rhythm, no murmurs/rubs/gallops  ABDOMEN: soft, rotund, non-distended, bowel sounds active x 4, obese  EXTREMITIES: trace edema to bilateral lower extremities. No varicosities to bilateral lower extremities. NEUROLOGIC: CN II-XII are grossly intact. Gait is smooth, rhythmic and effortless. Testing:   EKG: on file from 12/30/2021  Labs pending: drawn 3/2/2022   Chest XRay:  1/24/2022-   IMPRESSIONS:   Obesity.   PLANS:   XI ROBOTIC SLEEVE WITH EGD AND LIVER BIOPSY-GI SCHEDULED    JULIETA UMAÑA - CNP  Electronically signed 3/2/2022 at 10:28 AM

## 2022-03-02 NOTE — H&P (VIEW-ONLY)
History and Physical    Pt Name: Laly Ng  MRN: [id number]  YOB: 1985  Date of evaluation: 3/2/2022  Primary Care Physician: Tomi Wick DO    SUBJECTIVE:   History of Chief Complaint:    Laly Ng is a 29 y.o. female who presents for PAT appointment. Patient reports she has been trying to lose weight for years, using many techniques including atkins diet, weight watchers, changing her diet in general, exercise, and using atipex. Patient reports she did lose weight with weight watchers, up to 50 lbs but was not sustained. Due to several comorbidities, she has opted for surgical intervention. Patient has been scheduled for XI ROBOTIC SLEEVE WITH EGD AND LIVER BIOPSY-GI SCHEDULED. She had been through PAT process 1/24/22,and anesthesia evaluated her at that time d/t +covid in December 2021 and recommended delaying surgery 8-10 weeks as well as seeing a pulmonologist, so she presents for repeat PAT visit today. She reports she feels \"great\" and is without any cardiopulmonary complaints. She has since followed with her PCP who cleared the patient on 2/1/22 and patient states PCP felt without need to see a pulmonologist.   Allergies  is allergic to vancomycin. Medications  Prior to Admission medications    Medication Sig Start Date End Date Taking?  Authorizing Provider   metoprolol succinate (TOPROL XL) 100 MG extended release tablet TAKE ONE TABLET BY MOUTH DAILY 2/15/22  Yes Theresa Alvarado MD   losartan-hydroCHLOROthiazide (HYZAAR) 100-25 MG per tablet Take 1 tablet by mouth daily 1/12/22  Yes Tomi Wick DO     Past Medical History    has a past medical history of Class 3 severe obesity due to excess calories without serious comorbidity with body mass index (BMI) of 60.0 to 69.9 in adult [facility]), Essential hypertension, Hyperlipidemia, Hyperlipidemia, Pneumonia due to COVID-19 virus, Prediabetes, and Under care of team.  Past Surgical History   has a past surgical history that includes  section; intrauterine device insertion (2021); Upper gastrointestinal endoscopy (N/A, 2021); [place] tooth extraction; and Endoscopy, colon, diagnostic (2021). Social History   reports that she has never smoked. She has never used smokeless tobacco.    reports no history of alcohol use. reports no history of drug use. Marital Status single  Children 1  Occupation LPN  Family History  Family Status   Relation Name Status    Mother  Alive    Father  Alive    MGM      MGF      PGM  Alive    PGF  Alive     family history includes Cancer in her maternal grandfather and paternal grandmother; Depression in her mother; Diabetes in her maternal grandfather, maternal grandmother, and mother; Heart Disease in her father, maternal grandfather, and mother; High Blood Pressure in her maternal grandfather, maternal grandmother, and mother; Kidney Disease in her maternal grandmother; Other in her father and mother. Review of Systems:  CONSTITUTIONAL:   negative for fevers, chills, fatigue and malaise    EYES:   negative for double vision, blurred vision and photophobia    HEENT:   negative for tinnitus, epistaxis and sore throat     RESPIRATORY:   negative for cough, shortness of breath, wheezing     CARDIOVASCULAR:   negative for chest pain, palpitations, syncope, edema     GASTROINTESTINAL:   negative for nausea, vomiting     GENITOURINARY:   negative for incontinence     MUSCULOSKELETAL:   negative for neck or back pain     NEUROLOGICAL:   Negative for weakness and tingling  negative for headaches and dizziness     PSYCHIATRIC:   negative for anxiety       OBJECTIVE:   VITALS:  height is 5' 3\" (1.6 m) and weight is 368 lb (166.9 kg) (abnormal). Her infrared temperature is 97.7 °F (36.5 °C). Her blood pressure is 145/80 (abnormal) and her pulse is 75. Her respiration is 20 and oxygen saturation is 98%. CONSTITUTIONAL:alert & oriented x 3, no acute distress.  Calm and pleasant. SKIN:  Warm and dry, no rashes to exposed areas of skin. HEAD:  Normocephalic, atraumatic. EYES: PERRL. EOMs intact. EARS:  Intact and equal bilaterally. No edema or thickening, without lumps, lesions, or discharge. Hearing grossly WNL. NOSE:  Nares patent. No rhinorrhea   MOUTH/THROAT:  Mucous membranes pink and moist, uvula midline, teeth appear to be intact. NECK:supple, no lymphadenopathy  LUNGS: Respirations even and non-labored. Clear to auscultation to all fields. No wheezing, no rales. CARDIOVASCULAR: Regular rate and rhythm, no murmurs/rubs/gallops  ABDOMEN: soft, rotund, non-distended, bowel sounds active x 4, obese  EXTREMITIES: trace edema to bilateral lower extremities. No varicosities to bilateral lower extremities. NEUROLOGIC: CN II-XII are grossly intact. Gait is smooth, rhythmic and effortless. Testing:   EKG: on file from 12/30/2021  Labs pending: drawn 3/2/2022   Chest XRay:  1/24/2022-   IMPRESSIONS:   Obesity.   PLANS:   XI ROBOTIC SLEEVE WITH EGD AND LIVER BIOPSY-GI SCHEDULED    JULIETA UMAÑA - CNP  Electronically signed 3/2/2022 at 10:28 AM

## 2022-03-03 NOTE — PROGRESS NOTES
[address] [facility] MIN INVASIVE BARIATRIC SURG  [address] [place] [address] [place]  [address]  [facility] [address]  Dept: [phone]    SURGICAL WEIGHT MANAGEMENT PROGRAM   PROGRESS NOTE - SURGICAL EVALUATION    CC: Weight Loss       Patient: Juan Moore        Service Date: 2/24/2022       Medical Record #: [id number]    Patient History/Assessment Summary:    The patient is a pleasant 29y.o. year old female  with morbid obesity, who stands Height: 5' 3\" (160 cm) tall with a weight of Weight: (!) 385 lb (174.6 kg) , resulting in a BMI of Body mass index is 68.2 kg/m². This patient is alone for the evaluation today. The patient is being evaluated to undergo weight loss surgery to treat the following comorbid conditions caused by her morbid obesity: Hypertension and Dyspnea on Exertion    She attended the weight loss surgery seminar, and attended bariatric education    Last Visit Weight:   Wt Readings from Last 3 Encounters:   03/02/22 (!) 368 lb (166.9 kg)   02/24/22 (!) 385 lb (174.6 kg)   02/01/22 (!) 385 lb (174.6 kg)     Today's weight is decreased from the last visit. Highest Weight: 385    The patient is being evaluated for Laparoscopic Sleeve Gastrectomy. She  is here today to review the details of surgery. The patient acknowledges and understands the risks, benefits, and options we have discussed, as outlined in the Additional Informed Consent for this procedure. Patient also understands the importance of surgical and post-operative recommendations, including the operative diet and regular post-operative follow up care. The importance of ambulation and incentive spirometry was also discussed. All questions of this patient and any family members present have been answered to their satisfaction.     Physical Examination:     /82 (Site: Right Upper Arm, Position: Sitting, Cuff Size: Large Adult)   Pulse 80   Resp 20   Ht 5' 3\" (1.6 m)   Wt (!) 385 lb (174.6 kg)   LMP 02/08/2022   BMI 68.20 kg/m²   General This patient is awake, alert, and oriented, and is in no apparent distress. Cardiac Regular rate and rhythm without evidence of murmur. Respiratory Clear to auscultation bilaterally. Abdomen Obese, soft, non-tender, non-distended without masses/ No  evidence of abdominal hernia / Incisions consistent with previous surgeries. Head and Neck Obese, normocephalic and atraumatic/soft and supple, no  lymphadenopathy or obvious bruits. Extremeties No cyanosis, clubbing or edema/ No calf tenderness/No  restrictions of movement, is ambulatory without assistance. Neurological Intact x 4 extremities, no focal deficits noted   Skin No rashes or lesions noted   Rectal Deferred     RECOMMENDATIONS:       Diagnosis Orders   1. Essential hypertension     2. Morbid obesity with BMI of 60.0-69.9, adult ([facility] [address]            We spent a great deal of time discussing the risks and benefits of Laparoscopic Sleeve Gastrectomy, including but not limited to injury to intra-abdominal organs, breakdown of the gastric staple line, the need for re-operative therapy,  prolonged hospitalization,  mechanical ventilation,  and death. We discussed the possibility of bleeding, the need for blood transfusions, blood clots, hospital-acquired and intra-abdominal infection, anastomotic stricture, and worsening GERD. And we discussed the need for post-operative visit compliance, behavior modifications and diet changes, protein and vitamin supplementation, as well as routine scheduled and dedicated exercise. We discussed the potential weight loss benefit of approximately 50-60% of her excess body weight at 12-18 months post-op, as well as the possibility of insufficient weight loss or weight gain after 2 years post-operative time.      The following was discussed with the patient:    DVT Prophylaxis    Hypertension  Need for long term diet and exercise to improve hypertension  High protein, low carbohydrate diet  Daily 30 minutes of exercise    Morbid Obesity, Elevated BMI  Medical and surgical options discussed with patient. They would like to move forward with surgery  Need for long term diet and exercise discussed with patient  Higher than average risk given BMI over 65    Electronically signed by Rose Bell DO on 3/2/2022 at 8:55 PM    Please note that this chart was generated using voice recognition Dragon dictation software. Although every effort was made to ensure the accuracy of this automated transcription, some errors in transcription may have occurred.

## 2022-03-05 LAB
3-OH-COTININE: <2 NG/ML
COTININE: <2 NG/ML
NICOTINE: <2 NG/ML

## 2022-03-07 ENCOUNTER — HOSPITAL ENCOUNTER (INPATIENT)
Age: 37
LOS: 1 days | Discharge: HOME | DRG: 403 | End: 2022-03-08
Attending: SURGERY | Admitting: SURGERY
Payer: MEDICARE

## 2022-03-07 ENCOUNTER — ANESTHESIA (OUTPATIENT)
Dept: OPERATING ROOM | Age: 37
DRG: 403 | End: 2022-03-07
Payer: MEDICARE

## 2022-03-07 ENCOUNTER — ANESTHESIA EVENT (OUTPATIENT)
Dept: OPERATING ROOM | Age: 37
DRG: 403 | End: 2022-03-07
Payer: MEDICARE

## 2022-03-07 VITALS — SYSTOLIC BLOOD PRESSURE: 141 MMHG | TEMPERATURE: 99.7 F | OXYGEN SATURATION: 100 % | DIASTOLIC BLOOD PRESSURE: 112 MMHG

## 2022-03-07 DIAGNOSIS — Z98.84 S/P BARIATRIC SURGERY: Primary | ICD-10-CM

## 2022-03-07 LAB
ABSOLUTE EOS #: <0.03 K/UL (ref 0–0.44)
ABSOLUTE IMMATURE GRANULOCYTE: 0.12 K/UL (ref 0–0.3)
ABSOLUTE LYMPH #: 2.4 K/UL (ref 1.1–3.7)
ABSOLUTE MONO #: 0.51 K/UL (ref 0.1–1.2)
ANION GAP SERPL CALCULATED.3IONS-SCNC: 13 MMOL/L (ref 9–17)
ANION GAP SERPL CALCULATED.3IONS-SCNC: 14 MMOL/L (ref 9–17)
BASOPHILS # BLD: 0 % (ref 0–2)
BASOPHILS ABSOLUTE: 0.05 K/UL (ref 0–0.2)
BUN BLDV-MCNC: 10 MG/DL (ref 6–20)
BUN BLDV-MCNC: 10 MG/DL (ref 6–20)
CALCIUM SERPL-MCNC: 8.9 MG/DL (ref 8.6–10.4)
CALCIUM SERPL-MCNC: 8.9 MG/DL (ref 8.6–10.4)
CHLORIDE BLD-SCNC: 100 MMOL/L (ref 98–107)
CHLORIDE BLD-SCNC: 99 MMOL/L (ref 98–107)
CO2: 24 MMOL/L (ref 20–31)
CO2: 24 MMOL/L (ref 20–31)
CREAT SERPL-MCNC: 0.7 MG/DL (ref 0.5–0.9)
CREAT SERPL-MCNC: 0.72 MG/DL (ref 0.5–0.9)
EOSINOPHILS RELATIVE PERCENT: 0 % (ref 1–4)
GFR AFRICAN AMERICAN: >60 ML/MIN
GFR AFRICAN AMERICAN: >60 ML/MIN
GFR NON-AFRICAN AMERICAN: >60 ML/MIN
GFR NON-AFRICAN AMERICAN: >60 ML/MIN
GFR SERPL CREATININE-BSD FRML MDRD: ABNORMAL ML/MIN/{1.73_M2}
GFR SERPL CREATININE-BSD FRML MDRD: ABNORMAL ML/MIN/{1.73_M2}
GLUCOSE BLD-MCNC: 148 MG/DL (ref 70–99)
GLUCOSE BLD-MCNC: 148 MG/DL (ref 70–99)
HCG, PREGNANCY URINE (POC): NEGATIVE
HCT VFR BLD CALC: 36.4 % (ref 36.3–47.1)
HCT VFR BLD CALC: 36.5 % (ref 36.3–47.1)
HEMOGLOBIN: 10.8 G/DL (ref 11.9–15.1)
HEMOGLOBIN: 11 G/DL (ref 11.9–15.1)
IMMATURE GRANULOCYTES: 1 %
LYMPHOCYTES # BLD: 11 % (ref 24–43)
MCH RBC QN AUTO: 23.8 PG (ref 25.2–33.5)
MCH RBC QN AUTO: 24.5 PG (ref 25.2–33.5)
MCHC RBC AUTO-ENTMCNC: 29.6 G/DL (ref 28.4–34.8)
MCHC RBC AUTO-ENTMCNC: 30.2 G/DL (ref 28.4–34.8)
MCV RBC AUTO: 80.4 FL (ref 82.6–102.9)
MCV RBC AUTO: 81.1 FL (ref 82.6–102.9)
MONOCYTES # BLD: 2 % (ref 3–12)
NRBC AUTOMATED: 0 PER 100 WBC
NRBC AUTOMATED: 0 PER 100 WBC
PDW BLD-RTO: 14.2 % (ref 11.8–14.4)
PDW BLD-RTO: 14.2 % (ref 11.8–14.4)
PLATELET # BLD: 500 K/UL (ref 138–453)
PLATELET # BLD: 551 K/UL (ref 138–453)
PMV BLD AUTO: 9.7 FL (ref 8.1–13.5)
PMV BLD AUTO: 9.9 FL (ref 8.1–13.5)
POTASSIUM SERPL-SCNC: 3.5 MMOL/L (ref 3.7–5.3)
POTASSIUM SERPL-SCNC: 4.1 MMOL/L (ref 3.7–5.3)
RBC # BLD: 4.49 M/UL (ref 3.95–5.11)
RBC # BLD: 4.54 M/UL (ref 3.95–5.11)
RBC # BLD: ABNORMAL 10*6/UL
SEG NEUTROPHILS: 86 % (ref 36–65)
SEGMENTED NEUTROPHILS ABSOLUTE COUNT: 18.71 K/UL (ref 1.5–8.1)
SODIUM BLD-SCNC: 136 MMOL/L (ref 135–144)
SODIUM BLD-SCNC: 138 MMOL/L (ref 135–144)
WBC # BLD: 17.3 K/UL (ref 3.5–11.3)
WBC # BLD: 21.8 K/UL (ref 3.5–11.3)

## 2022-03-07 PROCEDURE — 6360000002 HC RX W HCPCS: Performed by: ANESTHESIOLOGY

## 2022-03-07 PROCEDURE — 7100000000 HC PACU RECOVERY - FIRST 15 MIN: Performed by: SURGERY

## 2022-03-07 PROCEDURE — 2580000003 HC RX 258

## 2022-03-07 PROCEDURE — 6360000002 HC RX W HCPCS

## 2022-03-07 PROCEDURE — 80048 BASIC METABOLIC PNL TOTAL CA: CPT

## 2022-03-07 PROCEDURE — 3600000019 HC SURGERY ROBOT ADDTL 15MIN: Performed by: SURGERY

## 2022-03-07 PROCEDURE — 2709999900 HC NON-CHARGEABLE SUPPLY: Performed by: SURGERY

## 2022-03-07 PROCEDURE — 6360000002 HC RX W HCPCS: Performed by: NURSE ANESTHETIST, CERTIFIED REGISTERED

## 2022-03-07 PROCEDURE — 0DB64Z3 EXCISION OF STOMACH, PERCUTANEOUS ENDOSCOPIC APPROACH, VERTICAL: ICD-10-PCS | Performed by: SURGERY

## 2022-03-07 PROCEDURE — 2580000003 HC RX 258: Performed by: ANESTHESIOLOGY

## 2022-03-07 PROCEDURE — 8E0W4CZ ROBOTIC ASSISTED PROCEDURE OF TRUNK REGION, PERCUTANEOUS ENDOSCOPIC APPROACH: ICD-10-PCS | Performed by: SURGERY

## 2022-03-07 PROCEDURE — 6360000002 HC RX W HCPCS: Performed by: STUDENT IN AN ORGANIZED HEALTH CARE EDUCATION/TRAINING PROGRAM

## 2022-03-07 PROCEDURE — 0DJ08ZZ INSPECTION OF UPPER INTESTINAL TRACT, VIA NATURAL OR ARTIFICIAL OPENING ENDOSCOPIC: ICD-10-PCS | Performed by: SURGERY

## 2022-03-07 PROCEDURE — 7100000001 HC PACU RECOVERY - ADDTL 15 MIN: Performed by: SURGERY

## 2022-03-07 PROCEDURE — 85027 COMPLETE CBC AUTOMATED: CPT

## 2022-03-07 PROCEDURE — 3700000000 HC ANESTHESIA ATTENDED CARE: Performed by: SURGERY

## 2022-03-07 PROCEDURE — 1200000000 HC SEMI PRIVATE

## 2022-03-07 PROCEDURE — 2580000003 HC RX 258: Performed by: SURGERY

## 2022-03-07 PROCEDURE — 85025 COMPLETE CBC W/AUTO DIFF WBC: CPT

## 2022-03-07 PROCEDURE — 2500000003 HC RX 250 WO HCPCS

## 2022-03-07 PROCEDURE — 2580000003 HC RX 258: Performed by: STUDENT IN AN ORGANIZED HEALTH CARE EDUCATION/TRAINING PROGRAM

## 2022-03-07 PROCEDURE — 2720000010 HC SURG SUPPLY STERILE: Performed by: SURGERY

## 2022-03-07 PROCEDURE — 81025 URINE PREGNANCY TEST: CPT

## 2022-03-07 PROCEDURE — 2500000003 HC RX 250 WO HCPCS: Performed by: STUDENT IN AN ORGANIZED HEALTH CARE EDUCATION/TRAINING PROGRAM

## 2022-03-07 PROCEDURE — 6360000002 HC RX W HCPCS: Performed by: SURGERY

## 2022-03-07 PROCEDURE — 36415 COLL VENOUS BLD VENIPUNCTURE: CPT

## 2022-03-07 PROCEDURE — 3600000009 HC SURGERY ROBOT BASE: Performed by: SURGERY

## 2022-03-07 PROCEDURE — 6370000000 HC RX 637 (ALT 250 FOR IP): Performed by: STUDENT IN AN ORGANIZED HEALTH CARE EDUCATION/TRAINING PROGRAM

## 2022-03-07 PROCEDURE — 2500000003 HC RX 250 WO HCPCS: Performed by: NURSE ANESTHETIST, CERTIFIED REGISTERED

## 2022-03-07 PROCEDURE — 3700000001 HC ADD 15 MINUTES (ANESTHESIA): Performed by: SURGERY

## 2022-03-07 PROCEDURE — S2900 ROBOTIC SURGICAL SYSTEM: HCPCS | Performed by: SURGERY

## 2022-03-07 PROCEDURE — 43775 LAP SLEEVE GASTRECTOMY: CPT | Performed by: SURGERY

## 2022-03-07 PROCEDURE — 88307 TISSUE EXAM BY PATHOLOGIST: CPT

## 2022-03-07 RX ORDER — SODIUM CHLORIDE 0.9 % (FLUSH) 0.9 %
5-40 SYRINGE (ML) INJECTION EVERY 12 HOURS SCHEDULED
Status: DISCONTINUED | OUTPATIENT
Start: 2022-03-07 | End: 2022-03-07 | Stop reason: HOSPADM

## 2022-03-07 RX ORDER — SODIUM CHLORIDE 9 MG/ML
25 INJECTION, SOLUTION INTRAVENOUS PRN
Status: DISCONTINUED | OUTPATIENT
Start: 2022-03-07 | End: 2022-03-08 | Stop reason: HOSPADM

## 2022-03-07 RX ORDER — PROMETHAZINE HYDROCHLORIDE 25 MG/1
25 TABLET ORAL EVERY 6 HOURS PRN
Status: DISCONTINUED | OUTPATIENT
Start: 2022-03-07 | End: 2022-03-08 | Stop reason: HOSPADM

## 2022-03-07 RX ORDER — LABETALOL HYDROCHLORIDE 5 MG/ML
10 INJECTION, SOLUTION INTRAVENOUS EVERY 6 HOURS PRN
Status: DISCONTINUED | OUTPATIENT
Start: 2022-03-07 | End: 2022-03-08 | Stop reason: HOSPADM

## 2022-03-07 RX ORDER — HEPARIN SODIUM 5000 [USP'U]/ML
5000 INJECTION, SOLUTION INTRAVENOUS; SUBCUTANEOUS EVERY 8 HOURS SCHEDULED
Status: DISCONTINUED | OUTPATIENT
Start: 2022-03-07 | End: 2022-03-08 | Stop reason: HOSPADM

## 2022-03-07 RX ORDER — DROPERIDOL 2.5 MG/ML
0.62 INJECTION, SOLUTION INTRAMUSCULAR; INTRAVENOUS ONCE
Status: COMPLETED | OUTPATIENT
Start: 2022-03-07 | End: 2022-03-07

## 2022-03-07 RX ORDER — FENTANYL CITRATE 50 UG/ML
INJECTION, SOLUTION INTRAMUSCULAR; INTRAVENOUS PRN
Status: DISCONTINUED | OUTPATIENT
Start: 2022-03-07 | End: 2022-03-07 | Stop reason: SDUPTHER

## 2022-03-07 RX ORDER — ONDANSETRON 2 MG/ML
4 INJECTION INTRAMUSCULAR; INTRAVENOUS EVERY 6 HOURS PRN
Status: DISCONTINUED | OUTPATIENT
Start: 2022-03-07 | End: 2022-03-08 | Stop reason: HOSPADM

## 2022-03-07 RX ORDER — MIDAZOLAM HYDROCHLORIDE 1 MG/ML
INJECTION INTRAMUSCULAR; INTRAVENOUS PRN
Status: DISCONTINUED | OUTPATIENT
Start: 2022-03-07 | End: 2022-03-07 | Stop reason: SDUPTHER

## 2022-03-07 RX ORDER — SODIUM CHLORIDE, SODIUM LACTATE, POTASSIUM CHLORIDE, CALCIUM CHLORIDE 600; 310; 30; 20 MG/100ML; MG/100ML; MG/100ML; MG/100ML
INJECTION, SOLUTION INTRAVENOUS CONTINUOUS
Status: DISCONTINUED | OUTPATIENT
Start: 2022-03-07 | End: 2022-03-08

## 2022-03-07 RX ORDER — SCOLOPAMINE TRANSDERMAL SYSTEM 1 MG/1
1 PATCH, EXTENDED RELEASE TRANSDERMAL
Status: DISCONTINUED | OUTPATIENT
Start: 2022-03-07 | End: 2022-03-08 | Stop reason: HOSPADM

## 2022-03-07 RX ORDER — SODIUM CHLORIDE 0.9 % (FLUSH) 0.9 %
5-40 SYRINGE (ML) INJECTION PRN
Status: DISCONTINUED | OUTPATIENT
Start: 2022-03-07 | End: 2022-03-07 | Stop reason: HOSPADM

## 2022-03-07 RX ORDER — OXYCODONE HYDROCHLORIDE AND ACETAMINOPHEN 5; 325 MG/1; MG/1
1 TABLET ORAL EVERY 6 HOURS PRN
Status: DISCONTINUED | OUTPATIENT
Start: 2022-03-07 | End: 2022-03-08 | Stop reason: HOSPADM

## 2022-03-07 RX ORDER — CYCLOBENZAPRINE HCL 10 MG
10 TABLET ORAL 3 TIMES DAILY PRN
Status: DISCONTINUED | OUTPATIENT
Start: 2022-03-07 | End: 2022-03-08 | Stop reason: HOSPADM

## 2022-03-07 RX ORDER — OXYCODONE HYDROCHLORIDE AND ACETAMINOPHEN 5; 325 MG/1; MG/1
2 TABLET ORAL EVERY 6 HOURS PRN
Status: DISCONTINUED | OUTPATIENT
Start: 2022-03-07 | End: 2022-03-08 | Stop reason: HOSPADM

## 2022-03-07 RX ORDER — SODIUM CHLORIDE 9 MG/ML
25 INJECTION, SOLUTION INTRAVENOUS PRN
Status: DISCONTINUED | OUTPATIENT
Start: 2022-03-07 | End: 2022-03-07 | Stop reason: HOSPADM

## 2022-03-07 RX ORDER — MAGNESIUM SULFATE 1 G/100ML
INJECTION INTRAVENOUS PRN
Status: DISCONTINUED | OUTPATIENT
Start: 2022-03-07 | End: 2022-03-07 | Stop reason: SDUPTHER

## 2022-03-07 RX ORDER — SODIUM CHLORIDE 0.9 % (FLUSH) 0.9 %
5-40 SYRINGE (ML) INJECTION EVERY 12 HOURS SCHEDULED
Status: DISCONTINUED | OUTPATIENT
Start: 2022-03-07 | End: 2022-03-08 | Stop reason: HOSPADM

## 2022-03-07 RX ORDER — PROPOFOL 10 MG/ML
INJECTION, EMULSION INTRAVENOUS PRN
Status: DISCONTINUED | OUTPATIENT
Start: 2022-03-07 | End: 2022-03-07 | Stop reason: SDUPTHER

## 2022-03-07 RX ORDER — ROCURONIUM BROMIDE 10 MG/ML
INJECTION, SOLUTION INTRAVENOUS PRN
Status: DISCONTINUED | OUTPATIENT
Start: 2022-03-07 | End: 2022-03-07 | Stop reason: SDUPTHER

## 2022-03-07 RX ORDER — LIDOCAINE HYDROCHLORIDE 10 MG/ML
INJECTION, SOLUTION EPIDURAL; INFILTRATION; INTRACAUDAL; PERINEURAL PRN
Status: DISCONTINUED | OUTPATIENT
Start: 2022-03-07 | End: 2022-03-07 | Stop reason: SDUPTHER

## 2022-03-07 RX ORDER — SODIUM CHLORIDE, SODIUM LACTATE, POTASSIUM CHLORIDE, CALCIUM CHLORIDE 600; 310; 30; 20 MG/100ML; MG/100ML; MG/100ML; MG/100ML
1000 INJECTION, SOLUTION INTRAVENOUS CONTINUOUS
Status: DISCONTINUED | OUTPATIENT
Start: 2022-03-07 | End: 2022-03-07

## 2022-03-07 RX ORDER — ONDANSETRON 2 MG/ML
INJECTION INTRAMUSCULAR; INTRAVENOUS PRN
Status: DISCONTINUED | OUTPATIENT
Start: 2022-03-07 | End: 2022-03-07 | Stop reason: SDUPTHER

## 2022-03-07 RX ORDER — MAGNESIUM HYDROXIDE 1200 MG/15ML
LIQUID ORAL CONTINUOUS PRN
Status: COMPLETED | OUTPATIENT
Start: 2022-03-07 | End: 2022-03-07

## 2022-03-07 RX ORDER — SODIUM CHLORIDE 0.9 % (FLUSH) 0.9 %
5-40 SYRINGE (ML) INJECTION PRN
Status: DISCONTINUED | OUTPATIENT
Start: 2022-03-07 | End: 2022-03-08 | Stop reason: HOSPADM

## 2022-03-07 RX ORDER — PROMETHAZINE HYDROCHLORIDE 25 MG/1
25 TABLET ORAL EVERY 6 HOURS PRN
Qty: 28 TABLET | Refills: 0 | OUTPATIENT
Start: 2022-03-07 | End: 2022-03-14

## 2022-03-07 RX ORDER — DEXAMETHASONE SODIUM PHOSPHATE 10 MG/ML
INJECTION INTRAMUSCULAR; INTRAVENOUS PRN
Status: DISCONTINUED | OUTPATIENT
Start: 2022-03-07 | End: 2022-03-07 | Stop reason: SDUPTHER

## 2022-03-07 RX ORDER — HEPARIN SODIUM 5000 [USP'U]/ML
5000 INJECTION, SOLUTION INTRAVENOUS; SUBCUTANEOUS ONCE
Status: COMPLETED | OUTPATIENT
Start: 2022-03-07 | End: 2022-03-07

## 2022-03-07 RX ORDER — METOPROLOL SUCCINATE 100 MG/1
100 TABLET, EXTENDED RELEASE ORAL DAILY
Status: DISCONTINUED | OUTPATIENT
Start: 2022-03-08 | End: 2022-03-08 | Stop reason: HOSPADM

## 2022-03-07 RX ORDER — SODIUM CHLORIDE, SODIUM LACTATE, POTASSIUM CHLORIDE, CALCIUM CHLORIDE 600; 310; 30; 20 MG/100ML; MG/100ML; MG/100ML; MG/100ML
INJECTION, SOLUTION INTRAVENOUS CONTINUOUS PRN
Status: DISCONTINUED | OUTPATIENT
Start: 2022-03-07 | End: 2022-03-07 | Stop reason: SDUPTHER

## 2022-03-07 RX ORDER — HYDRALAZINE HYDROCHLORIDE 20 MG/ML
10 INJECTION INTRAMUSCULAR; INTRAVENOUS EVERY 6 HOURS PRN
Status: DISCONTINUED | OUTPATIENT
Start: 2022-03-07 | End: 2022-03-08 | Stop reason: HOSPADM

## 2022-03-07 RX ADMIN — SODIUM CHLORIDE, POTASSIUM CHLORIDE, SODIUM LACTATE AND CALCIUM CHLORIDE: 600; 310; 30; 20 INJECTION, SOLUTION INTRAVENOUS at 15:18

## 2022-03-07 RX ADMIN — FAMOTIDINE 20 MG: 10 INJECTION INTRAVENOUS at 21:53

## 2022-03-07 RX ADMIN — LIDOCAINE HYDROCHLORIDE 40 MG: 10 INJECTION, SOLUTION EPIDURAL; INFILTRATION; INTRACAUDAL; PERINEURAL at 15:30

## 2022-03-07 RX ADMIN — PHENYLEPHRINE HYDROCHLORIDE 200 MCG: 10 INJECTION INTRAVENOUS at 17:09

## 2022-03-07 RX ADMIN — SODIUM CHLORIDE, POTASSIUM CHLORIDE, SODIUM LACTATE AND CALCIUM CHLORIDE 1000 ML: 600; 310; 30; 20 INJECTION, SOLUTION INTRAVENOUS at 11:23

## 2022-03-07 RX ADMIN — SODIUM CHLORIDE, POTASSIUM CHLORIDE, SODIUM LACTATE AND CALCIUM CHLORIDE: 600; 310; 30; 20 INJECTION, SOLUTION INTRAVENOUS at 18:14

## 2022-03-07 RX ADMIN — HYDROMORPHONE HYDROCHLORIDE 1 MG: 1 INJECTION, SOLUTION INTRAMUSCULAR; INTRAVENOUS; SUBCUTANEOUS at 23:24

## 2022-03-07 RX ADMIN — HYDROMORPHONE HYDROCHLORIDE 1 MG: 1 INJECTION, SOLUTION INTRAMUSCULAR; INTRAVENOUS; SUBCUTANEOUS at 20:12

## 2022-03-07 RX ADMIN — Medication 3000 MG: at 15:44

## 2022-03-07 RX ADMIN — FENTANYL CITRATE 100 MCG: 50 INJECTION, SOLUTION INTRAMUSCULAR; INTRAVENOUS at 15:30

## 2022-03-07 RX ADMIN — SODIUM CHLORIDE, POTASSIUM CHLORIDE, SODIUM LACTATE AND CALCIUM CHLORIDE: 600; 310; 30; 20 INJECTION, SOLUTION INTRAVENOUS at 16:00

## 2022-03-07 RX ADMIN — MIDAZOLAM HYDROCHLORIDE 2 MG: 1 INJECTION, SOLUTION INTRAMUSCULAR; INTRAVENOUS at 15:30

## 2022-03-07 RX ADMIN — ROCURONIUM BROMIDE 10 MG: 10 INJECTION INTRAVENOUS at 17:03

## 2022-03-07 RX ADMIN — DROPERIDOL 0.62 MG: 2.5 INJECTION, SOLUTION INTRAMUSCULAR; INTRAVENOUS at 18:36

## 2022-03-07 RX ADMIN — PHENYLEPHRINE HYDROCHLORIDE 100 MCG: 10 INJECTION INTRAVENOUS at 15:54

## 2022-03-07 RX ADMIN — HEPARIN SODIUM 5000 UNITS: 5000 INJECTION INTRAVENOUS; SUBCUTANEOUS at 21:52

## 2022-03-07 RX ADMIN — ROCURONIUM BROMIDE 50 MG: 10 INJECTION INTRAVENOUS at 15:30

## 2022-03-07 RX ADMIN — HYDROMORPHONE HYDROCHLORIDE 0.5 MG: 1 INJECTION, SOLUTION INTRAMUSCULAR; INTRAVENOUS; SUBCUTANEOUS at 18:05

## 2022-03-07 RX ADMIN — PHENYLEPHRINE HYDROCHLORIDE 100 MCG: 10 INJECTION INTRAVENOUS at 16:01

## 2022-03-07 RX ADMIN — HYDROMORPHONE HYDROCHLORIDE 0.5 MG: 1 INJECTION, SOLUTION INTRAMUSCULAR; INTRAVENOUS; SUBCUTANEOUS at 18:58

## 2022-03-07 RX ADMIN — PHENYLEPHRINE HYDROCHLORIDE 100 MCG: 10 INJECTION INTRAVENOUS at 15:50

## 2022-03-07 RX ADMIN — ONDANSETRON 4 MG: 2 INJECTION INTRAMUSCULAR; INTRAVENOUS at 17:31

## 2022-03-07 RX ADMIN — HEPARIN SODIUM 5000 UNITS: 5000 INJECTION INTRAVENOUS; SUBCUTANEOUS at 11:23

## 2022-03-07 RX ADMIN — CEFAZOLIN SODIUM 2000 MG: 10 INJECTION, POWDER, FOR SOLUTION INTRAVENOUS at 20:17

## 2022-03-07 RX ADMIN — PHENYLEPHRINE HYDROCHLORIDE 100 MCG: 10 INJECTION INTRAVENOUS at 16:10

## 2022-03-07 RX ADMIN — SUGAMMADEX 300 MG: 100 INJECTION, SOLUTION INTRAVENOUS at 17:31

## 2022-03-07 RX ADMIN — PHENYLEPHRINE HYDROCHLORIDE 100 MCG/MIN: 10 INJECTION INTRAVENOUS at 15:59

## 2022-03-07 RX ADMIN — PROPOFOL 200 MG: 10 INJECTION, EMULSION INTRAVENOUS at 15:30

## 2022-03-07 RX ADMIN — ROCURONIUM BROMIDE 20 MG: 10 INJECTION INTRAVENOUS at 16:27

## 2022-03-07 RX ADMIN — PHENYLEPHRINE HYDROCHLORIDE 200 MCG: 10 INJECTION INTRAVENOUS at 15:57

## 2022-03-07 RX ADMIN — MAGNESIUM SULFATE HEPTAHYDRATE 1000 MG: 1 INJECTION, SOLUTION INTRAVENOUS at 16:13

## 2022-03-07 RX ADMIN — DEXAMETHASONE SODIUM PHOSPHATE 4 MG: 10 INJECTION INTRAMUSCULAR; INTRAVENOUS at 15:40

## 2022-03-07 ASSESSMENT — PULMONARY FUNCTION TESTS
PIF_VALUE: 32
PIF_VALUE: 33
PIF_VALUE: 30
PIF_VALUE: 32
PIF_VALUE: 25
PIF_VALUE: 25
PIF_VALUE: 29
PIF_VALUE: 31
PIF_VALUE: 30
PIF_VALUE: 29
PIF_VALUE: 1
PIF_VALUE: 32
PIF_VALUE: 29
PIF_VALUE: 24
PIF_VALUE: 32
PIF_VALUE: 28
PIF_VALUE: 32
PIF_VALUE: 25
PIF_VALUE: 0
PIF_VALUE: 32
PIF_VALUE: 28
PIF_VALUE: 9
PIF_VALUE: 21
PIF_VALUE: 32
PIF_VALUE: 26
PIF_VALUE: 32
PIF_VALUE: 27
PIF_VALUE: 32
PIF_VALUE: 14
PIF_VALUE: 31
PIF_VALUE: 33
PIF_VALUE: 29
PIF_VALUE: 33
PIF_VALUE: 29
PIF_VALUE: 32
PIF_VALUE: 31
PIF_VALUE: 25
PIF_VALUE: 29
PIF_VALUE: 26
PIF_VALUE: 3
PIF_VALUE: 32
PIF_VALUE: 31
PIF_VALUE: 30
PIF_VALUE: 32
PIF_VALUE: 30
PIF_VALUE: 30
PIF_VALUE: 32
PIF_VALUE: 28
PIF_VALUE: 24
PIF_VALUE: 32
PIF_VALUE: 32
PIF_VALUE: 30
PIF_VALUE: 32
PIF_VALUE: 26
PIF_VALUE: 25
PIF_VALUE: 2
PIF_VALUE: 30
PIF_VALUE: 0
PIF_VALUE: 24
PIF_VALUE: 24
PIF_VALUE: 30
PIF_VALUE: 0
PIF_VALUE: 32
PIF_VALUE: 26
PIF_VALUE: 29
PIF_VALUE: 33
PIF_VALUE: 28
PIF_VALUE: 31
PIF_VALUE: 29
PIF_VALUE: 0
PIF_VALUE: 32
PIF_VALUE: 29
PIF_VALUE: 32
PIF_VALUE: 29
PIF_VALUE: 32
PIF_VALUE: 35
PIF_VALUE: 32
PIF_VALUE: 33
PIF_VALUE: 32
PIF_VALUE: 32
PIF_VALUE: 26
PIF_VALUE: 26
PIF_VALUE: 32
PIF_VALUE: 32
PIF_VALUE: 25
PIF_VALUE: 32
PIF_VALUE: 25
PIF_VALUE: 31
PIF_VALUE: 33
PIF_VALUE: 32
PIF_VALUE: 25
PIF_VALUE: 31
PIF_VALUE: 34
PIF_VALUE: 32
PIF_VALUE: 32
PIF_VALUE: 30
PIF_VALUE: 25
PIF_VALUE: 33
PIF_VALUE: 31
PIF_VALUE: 11
PIF_VALUE: 32
PIF_VALUE: 27
PIF_VALUE: 32
PIF_VALUE: 32
PIF_VALUE: 28
PIF_VALUE: 26
PIF_VALUE: 0
PIF_VALUE: 33
PIF_VALUE: 32
PIF_VALUE: 28
PIF_VALUE: 32
PIF_VALUE: 26
PIF_VALUE: 29
PIF_VALUE: 32
PIF_VALUE: 26
PIF_VALUE: 32
PIF_VALUE: 29
PIF_VALUE: 31
PIF_VALUE: 31
PIF_VALUE: 0
PIF_VALUE: 33
PIF_VALUE: 32
PIF_VALUE: 26
PIF_VALUE: 31
PIF_VALUE: 36
PIF_VALUE: 27
PIF_VALUE: 32
PIF_VALUE: 32
PIF_VALUE: 1

## 2022-03-07 ASSESSMENT — PAIN SCALES - GENERAL
PAINLEVEL_OUTOF10: 10
PAINLEVEL_OUTOF10: 6
PAINLEVEL_OUTOF10: 8
PAINLEVEL_OUTOF10: 8
PAINLEVEL_OUTOF10: 0
PAINLEVEL_OUTOF10: 5
PAINLEVEL_OUTOF10: 0
PAINLEVEL_OUTOF10: 7
PAINLEVEL_OUTOF10: 10
PAINLEVEL_OUTOF10: 8

## 2022-03-07 ASSESSMENT — PAIN DESCRIPTION - PAIN TYPE
TYPE: SURGICAL PAIN

## 2022-03-07 ASSESSMENT — PAIN - FUNCTIONAL ASSESSMENT: PAIN_FUNCTIONAL_ASSESSMENT: 0-10

## 2022-03-07 ASSESSMENT — PAIN DESCRIPTION - LOCATION
LOCATION: ABDOMEN

## 2022-03-07 ASSESSMENT — PAIN DESCRIPTION - ORIENTATION
ORIENTATION: LEFT

## 2022-03-07 ASSESSMENT — PAIN DESCRIPTION - FREQUENCY
FREQUENCY: INTERMITTENT
FREQUENCY: CONTINUOUS
FREQUENCY: INTERMITTENT
FREQUENCY: CONTINUOUS

## 2022-03-07 NOTE — INTERVAL H&P NOTE
Pt Name: El Mtz  MRN: [id number]  YOB: 1985  Date of evaluation: 3/7/2022    I have reviewed the patient's history and physical examination completed in pre-admission testing on 3/2/22    No changes to history or on examination today, unless noted below. None.      JULIETA Tomas - CNP  3/7/22  11:28 AM

## 2022-03-07 NOTE — DISCHARGE SUMMARY
Surgery Discharge Summary     Patient Identification  Jacquie Montgomery is a 29 y.o. female. :  1985  Admit Date:  3/7/2022    Discharge date:  3/8/2022    Disposition: Home    Discharge Diagnoses:   Patient Active Problem List   Diagnosis    Hyperlipidemia    Pre-diabetes    Morbid obesity with BMI of 60.0-69.9, adult ([facility] [address]    Essential hypertension    Atypical squamous cells cannot exclude high grade squamous intraepithelial lesion on cytologic smear of cervix (ASC-H)    Vitamin D deficiency    IUD migration    Esophageal dysphagia    Antral gastritis    S/P bariatric surgery       Condition on discharge: Stable    Consults: None    Surgery: Laparoscopic sleeve gastrectomy, EGD    Patient Instructions: Activity: no heavy lifting, pushing, pulling for 6 weeks, no driving for 2 weeks or while on analgesics  Diet: Bariatric clear  Follow-up with Dr. Elisabet Black in 2 weeks. See pre-printed instructions in chart and given to patient upon discharge. Discharge Medications:        Medication List      ASK your doctor about these medications    losartan-hydroCHLOROthiazide 100-25 MG per tablet  Commonly known as: Hyzaar  Take 1 tablet by mouth daily     metoprolol succinate 100 MG extended release tablet  Commonly known as: TOPROL XL  TAKE ONE TABLET BY MOUTH DAILY             HPI and Hospital Course:   29 y.o. female presented on 3/7/2022 for elective laparoscopic sleeve gastrectomy. Hospital course was unremarkable. On day of discharge pt was tolerating diet, pain controlled with oral medications and ambulating without difficulty.       Electronically signed by Elizabeth Mena MD on 3/7/2022 at 3:35 PM

## 2022-03-07 NOTE — OP NOTE
Operative Note      Patient: Governor Linda  YOB: 1985  MRN: [id number]    Date of Procedure: 3/7/2022    Pre-Op Diagnosis: MORBID OBESITY, BMI 65, HYPERTENSION    Post-Op Diagnosis: Same and HIATAL HERNIA       Procedure(s):  XI ROBOTIC LAPAROSCOPIC GASTRIC SLEEVE, EGD    Surgeon(s):  Rosamaria Brush DO    Assistant:   First Assistant: Nikolai Hernandez  Resident: Chris Clancy MD    Anesthesia: General    Estimated Blood Loss (mL): Minimal    Complications: None    Specimens:   ID Type Source Tests Collected by Time Destination   A : PORTION OF STOMACH Tissue Stomach SURGICAL PATHOLOGY Rosamaria Brush DO 3/7/2022 1610        Implants:  * No implants in log *      Drains: * No LDAs found *    Findings:   1-2 CM HIATAL HERNIA  HEMOSTATIC STAPLE LINES  NEGATIVE LEAK TEST  COUNTS REPORTED TO ME AS CORRECT    Detailed Description of Procedure:   Operative narrative: The patient was taken to the operative suite and administered anesthesia by the anesthetic team.  Next we prepped and draped in normal sterile fashion. Incision was then made at Velásquez's point for a 12 mm port. The abdomen was surveyed and we entered the abdomen using a optical Visiport trocar of 12 mm in size. This was accomplished at Velásquez's point. Pneumoperitoneum was then established without complication, the underlying area surveyed. We then placed 4 other ports in standard fashion under direct laparoscopic visualization. Attention was then turned towards placement of the [facility] liver retractor. Small incision made just inferior to the xiphoid process for the liver retractor. The liver retractor was then placed under direct laparoscopic visualization in order to facilitate visualization of the stomach and hiatus. Visible hiatal hernia. At this time due to a hiatal hernia decision was made to perform hiatal hernia repair.   We mobilized the pars flaccida until the right kassi was noted and we could begin our dissection. Using accommodation of blunt and sharp dissection as well as electrocautery a window was created along the right kassi along the esophagus. The avascular plane was entered and this facilitated our 360 degree circumferential dissection exposing and dissecting the phrenoesophageal ligament. Careful dissection circumferentially around the esophagus to expose the left kassi. The right and left vagus nerves were identified. The hernia sac dissected from the mediastinum and pleura. The sac reduced from the esophagus and mediastinum. Upon completion of full dissection around the esophagus we then used O Ethibond suture to reapproximate the anterior and posterior kassi. A 50 Bahamian bougie was then passed by anesthesia under direct visualization until we could see this within the lumen of the stomach and this was across the crura in satisfactory position. Using the Ethibond we then closed the the hiatal hernia defect. A blunt probe could easily be passed between the crural defect closure and the esophagus while the bougie was in place. Satisfactory repair noted. We then turned our attention towards formation of the gastric sleeve. Starting at 6 cm proximal to the pylorus bite dissection was undertaken of the greater curvature and the short gastric vessels taken down using the Vessel Sealer. We mobilized this from our 6 cm starting point to the left kassi. Satisfactory mobilization was undertaken and there were noted to be no adhesions posteriorly on the stomach between the stomach and the pancreas or retroperitoneum. I then had anesthesia pass a 36 Bahamian bougie under direct visualization. This was visualized at the tip of the bougie as well as along the lesser curve. I then placed my first staple load a green 60 mm load to start sleeve formation. The bougie was noted be along the lesser curve and was freely mobile before the first green load was fired.   A second green load 60 mm stapler was then placed again using the bougie for assistance and guidance along the lesser curvature. I then used blue 60 mm stapler loads to complete sleeve formation. The last staple load was noted to fire in proper orientation to prevent staple line formation along the esophageal fibers. Satisfactory sleeve formation was noted at that time and the staple line was hemostatic. I then had the bougie removed. I then reinforced my staple line with 3-0 Vicryl sutures along the length of the staple line at the staple line confluences. At that time leak test was then started. The patient was placed in a Trendelenburg position and we irrigated the upper abdomen with saline. I then passed an Olympus endoscope into the oropharynx down the esophagus under direct visualization. The scope was passed down through the esophagus down into the lumen of the new gastric sleeve the scope passed easily through the incisura and into the antrum. The scope was then passed into the duodenum through the pylorus. The pylorus and duodenum appeared intact and the scope was slowly withdrawn while visualizing the staple line throughout the course of the staple line. I then clamped distally by applying pressure near the pyloric region to allow for distention of the gastric sleeve. We then further irrigated and there was no evidence of leak using a bubble test.  The staple line on the inner lumen of the stomach appeared intact and hemostatic throughout. The scope was slowly withdrawn to the GE junction and no evidence of stricture noted. The scope was then withdrawn from the esophagus and no other lesion noted. Attention was then turned back towards the abdomen the abdomen was aspirated of the prior placed saline for leak test.  I then placed an anchoring suture using 3-0 Vicryl suture to prevent torsion of the sleeve and migration of the sleeve superiorly. Specimen was withdrawn from the left upper quadrant incision.   We then irrigated this incision thoroughly with saline. Next counts reported to me as correct. The 12 mm port sites were then closed using a suture passer to pass 0 Vicryl suture under direct laparoscopic visualization for proper fascial reapproximation at each port site. All ports were then removed. Pneumoperitoneum was released in entirety. The skin was then closed using 4-0 Vicryl subcuticular stitches in interrupted fashion. The region was cleaned with sterile normal saline followed by placement of TinCoBen and Steri-Strips and sterile bandage. The patient tolerated the procedure well and was transferred to PACU. The patient's family was updated postoperatively.        Electronically signed by Anshul Bagley DO on 3/7/2022 at 5:26 PM

## 2022-03-07 NOTE — ANESTHESIA PRE PROCEDURE
Department of Anesthesiology  Preprocedure Note       Name:  Angela Marlow   Age:  29 y.o.  :  1985                                          MRN:  [id number]         Date:  3/7/2022      Surgeon: Mady Moss):  Danielle Dias DO    Procedure: Procedure(s):  XI ROBOTIC LAPAROSCOPIC GASTRIC SLEEVE, LIVER BIOPSY, EGD,  POSSIBLE OPEN - GI SCHEDULED    Medications prior to admission:   Prior to Admission medications    Medication Sig Start Date End Date Taking? Authorizing Provider   metoprolol succinate (TOPROL XL) 100 MG extended release tablet TAKE ONE TABLET BY MOUTH DAILY 2/15/22  Yes Pantera Abbott MD   losartan-hydroCHLOROthiazide (HYZAAR) 100-25 MG per tablet Take 1 tablet by mouth daily 22  Yes Malini Rodriguez DO       Current medications:    Current Facility-Administered Medications   Medication Dose Route Frequency Provider Last Rate Last Admin    ceFAZolin (ANCEF) 3000 mg in dextrose 5 % 100 mL IVPB  3,000 mg IntraVENous Once Clearance DO Arun        lactated ringers infusion 1,000 mL  1,000 mL IntraVENous Continuous Mark Hilliard MD 50 mL/hr at 22 1123 1,000 mL at 22 1123       Allergies: Allergies   Allergen Reactions    Vancomycin Itching     Patient felt like skin was on fire. Problem List:    Patient Active Problem List   Diagnosis Code    Hyperlipidemia E78.5    Pre-diabetes R73.03    Morbid obesity with BMI of 60.0-69.9, adult ([facility] [address] E66.01, Z68.44    Essential hypertension I10    Atypical squamous cells cannot exclude high grade squamous intraepithelial lesion on cytologic smear of cervix (ASC-H) R87.611    Vitamin D deficiency E55.9    IUD migration T83. 31XA    Esophageal dysphagia R13.19    Antral gastritis K29.50       Past Medical History:        Diagnosis Date    Class 3 severe obesity due to excess calories without serious comorbidity with body mass index (BMI) of 60.0 to 69.9 in adult [facility]) 2019    Essential hypertension Value Date     03/02/2022    K 3.8 03/02/2022    CL 98 03/02/2022    CO2 27 03/02/2022    BUN 15 03/02/2022    CREATININE 0.74 03/02/2022    GFRAA >60 03/02/2022    LABGLOM >60 03/02/2022    GLUCOSE 111 03/02/2022    PROT 7.5 05/23/2021    CALCIUM 9.3 03/02/2022    BILITOT <0.15 05/23/2021    ALKPHOS 84 05/23/2021    AST 18 05/23/2021    ALT 20 05/23/2021       POC Tests: No results for input(s): POCGLU, POCNA, POCK, POCCL, POCBUN, POCHEMO, POCHCT in the last 72 hours. Coags:   Lab Results   Component Value Date    PROTIME 10.3 03/02/2022    INR 1.0 03/02/2022    APTT 25.4 03/02/2022       HCG (If Applicable):   Lab Results   Component Value Date    PREGTESTUR negative 10/13/2020    HCG NEGATIVE 08/20/2021        ABGs: No results found for: PHART, PO2ART, WJM7JVS, HTC7ZET, BEART, D2WHZHWU     Type & Screen (If Applicable):  No results found for: LABABO, LABRH    Drug/Infectious Status (If Applicable):  No results found for: HIV, HEPCAB    COVID-19 Screening (If Applicable):   Lab Results   Component Value Date    COVID19 Not Detected 08/16/2021           Anesthesia Evaluation  Patient summary reviewed and Nursing notes reviewed no history of anesthetic complications:   Airway: Mallampati: III  TM distance: >3 FB   Neck ROM: full  Mouth opening: > = 3 FB Dental: normal exam         Pulmonary:normal exam    (+) pneumonia:                             Cardiovascular:  Exercise tolerance: good (>4 METS),   (+) hypertension: no interval change and mild, hyperlipidemia    (-) past MI, CAD, CABG/stent, dysrhythmias and  angina      Rhythm: regular  Rate: normal           Beta Blocker:  Not on Beta Blocker         Neuro/Psych:   Negative Neuro/Psych ROS              GI/Hepatic/Renal:   (+) morbid obesity          Endo/Other: Negative Endo/Other ROS                    Abdominal:             Vascular: Other Findings:           Anesthesia Plan      general     ASA 3       Induction: intravenous.     MIPS: Postoperative opioids intended and Prophylactic antiemetics administered. Anesthetic plan and risks discussed with patient. Use of blood products discussed with patient whom consented to blood products.    Plan discussed with CRNA and surgical team.                  Fazal Burks MD   3/7/2022

## 2022-03-08 VITALS
HEIGHT: 63 IN | HEART RATE: 61 BPM | SYSTOLIC BLOOD PRESSURE: 128 MMHG | BODY MASS INDEX: 65.19 KG/M2 | DIASTOLIC BLOOD PRESSURE: 74 MMHG | OXYGEN SATURATION: 96 % | TEMPERATURE: 98 F | RESPIRATION RATE: 20 BRPM

## 2022-03-08 LAB
ABSOLUTE EOS #: <0.03 K/UL (ref 0–0.44)
ABSOLUTE IMMATURE GRANULOCYTE: 0.06 K/UL (ref 0–0.3)
ABSOLUTE LYMPH #: 2.09 K/UL (ref 1.1–3.7)
ABSOLUTE MONO #: 1.07 K/UL (ref 0.1–1.2)
ANION GAP SERPL CALCULATED.3IONS-SCNC: 15 MMOL/L (ref 9–17)
BASOPHILS # BLD: 0 % (ref 0–2)
BASOPHILS ABSOLUTE: <0.03 K/UL (ref 0–0.2)
BUN BLDV-MCNC: 9 MG/DL (ref 6–20)
CALCIUM SERPL-MCNC: 9.1 MG/DL (ref 8.6–10.4)
CHLORIDE BLD-SCNC: 99 MMOL/L (ref 98–107)
CO2: 22 MMOL/L (ref 20–31)
CREAT SERPL-MCNC: 0.63 MG/DL (ref 0.5–0.9)
EOSINOPHILS RELATIVE PERCENT: 0 % (ref 1–4)
GFR AFRICAN AMERICAN: >60 ML/MIN
GFR NON-AFRICAN AMERICAN: >60 ML/MIN
GFR SERPL CREATININE-BSD FRML MDRD: ABNORMAL ML/MIN/{1.73_M2}
GLUCOSE BLD-MCNC: 115 MG/DL (ref 70–99)
HCT VFR BLD CALC: 35.4 % (ref 36.3–47.1)
HEMOGLOBIN: 10.5 G/DL (ref 11.9–15.1)
IMMATURE GRANULOCYTES: 0 %
LYMPHOCYTES # BLD: 14 % (ref 24–43)
MCH RBC QN AUTO: 24.5 PG (ref 25.2–33.5)
MCHC RBC AUTO-ENTMCNC: 29.7 G/DL (ref 28.4–34.8)
MCV RBC AUTO: 82.7 FL (ref 82.6–102.9)
MONOCYTES # BLD: 7 % (ref 3–12)
NRBC AUTOMATED: 0 PER 100 WBC
PDW BLD-RTO: 14.4 % (ref 11.8–14.4)
PLATELET # BLD: 614 K/UL (ref 138–453)
PMV BLD AUTO: 10.6 FL (ref 8.1–13.5)
POTASSIUM SERPL-SCNC: 4.9 MMOL/L (ref 3.7–5.3)
RBC # BLD: 4.28 M/UL (ref 3.95–5.11)
SEG NEUTROPHILS: 79 % (ref 36–65)
SEGMENTED NEUTROPHILS ABSOLUTE COUNT: 12.04 K/UL (ref 1.5–8.1)
SODIUM BLD-SCNC: 136 MMOL/L (ref 135–144)
WBC # BLD: 15.3 K/UL (ref 3.5–11.3)

## 2022-03-08 PROCEDURE — 85025 COMPLETE CBC W/AUTO DIFF WBC: CPT

## 2022-03-08 PROCEDURE — 6370000000 HC RX 637 (ALT 250 FOR IP): Performed by: STUDENT IN AN ORGANIZED HEALTH CARE EDUCATION/TRAINING PROGRAM

## 2022-03-08 PROCEDURE — 36415 COLL VENOUS BLD VENIPUNCTURE: CPT

## 2022-03-08 PROCEDURE — 94760 N-INVAS EAR/PLS OXIMETRY 1: CPT

## 2022-03-08 PROCEDURE — 80048 BASIC METABOLIC PNL TOTAL CA: CPT

## 2022-03-08 PROCEDURE — 2580000003 HC RX 258: Performed by: STUDENT IN AN ORGANIZED HEALTH CARE EDUCATION/TRAINING PROGRAM

## 2022-03-08 PROCEDURE — 2500000003 HC RX 250 WO HCPCS: Performed by: STUDENT IN AN ORGANIZED HEALTH CARE EDUCATION/TRAINING PROGRAM

## 2022-03-08 PROCEDURE — 6360000002 HC RX W HCPCS: Performed by: STUDENT IN AN ORGANIZED HEALTH CARE EDUCATION/TRAINING PROGRAM

## 2022-03-08 RX ORDER — PROMETHAZINE HYDROCHLORIDE 25 MG/1
25 TABLET ORAL EVERY 6 HOURS PRN
Qty: 30 TABLET | Refills: 0 | Status: SHIPPED | OUTPATIENT
Start: 2022-03-08

## 2022-03-08 RX ORDER — CYCLOBENZAPRINE HCL 10 MG
10 TABLET ORAL 3 TIMES DAILY PRN
Qty: 21 TABLET | Refills: 0 | Status: SHIPPED | OUTPATIENT
Start: 2022-03-08 | End: 2022-03-18

## 2022-03-08 RX ORDER — ONDANSETRON 4 MG/1
4 TABLET, FILM COATED ORAL EVERY 8 HOURS PRN
Qty: 30 TABLET | Refills: 0 | Status: SHIPPED | OUTPATIENT
Start: 2022-03-08 | End: 2022-09-22 | Stop reason: SDUPTHER

## 2022-03-08 RX ORDER — OXYCODONE HYDROCHLORIDE AND ACETAMINOPHEN 5; 325 MG/1; MG/1
1 TABLET ORAL EVERY 6 HOURS PRN
Qty: 28 TABLET | Refills: 0 | Status: SHIPPED | OUTPATIENT
Start: 2022-03-08 | End: 2022-03-15

## 2022-03-08 RX ADMIN — METOPROLOL SUCCINATE 100 MG: 100 TABLET, EXTENDED RELEASE ORAL at 08:20

## 2022-03-08 RX ADMIN — CEFAZOLIN SODIUM 2000 MG: 10 INJECTION, POWDER, FOR SOLUTION INTRAVENOUS at 05:07

## 2022-03-08 RX ADMIN — HYDROMORPHONE HYDROCHLORIDE 1 MG: 1 INJECTION, SOLUTION INTRAMUSCULAR; INTRAVENOUS; SUBCUTANEOUS at 02:29

## 2022-03-08 RX ADMIN — OXYCODONE HYDROCHLORIDE AND ACETAMINOPHEN 2 TABLET: 5; 325 TABLET ORAL at 10:21

## 2022-03-08 RX ADMIN — SODIUM CHLORIDE, POTASSIUM CHLORIDE, SODIUM LACTATE AND CALCIUM CHLORIDE: 600; 310; 30; 20 INJECTION, SOLUTION INTRAVENOUS at 02:32

## 2022-03-08 RX ADMIN — OXYCODONE HYDROCHLORIDE AND ACETAMINOPHEN 2 TABLET: 5; 325 TABLET ORAL at 05:09

## 2022-03-08 RX ADMIN — FAMOTIDINE 20 MG: 10 INJECTION INTRAVENOUS at 08:20

## 2022-03-08 RX ADMIN — ONDANSETRON 4 MG: 2 INJECTION INTRAMUSCULAR; INTRAVENOUS at 02:00

## 2022-03-08 RX ADMIN — HEPARIN SODIUM 5000 UNITS: 5000 INJECTION INTRAVENOUS; SUBCUTANEOUS at 06:14

## 2022-03-08 RX ADMIN — HYDROMORPHONE HYDROCHLORIDE 1 MG: 1 INJECTION, SOLUTION INTRAMUSCULAR; INTRAVENOUS; SUBCUTANEOUS at 06:15

## 2022-03-08 ASSESSMENT — PAIN SCALES - GENERAL
PAINLEVEL_OUTOF10: 7
PAINLEVEL_OUTOF10: 8
PAINLEVEL_OUTOF10: 5
PAINLEVEL_OUTOF10: 7
PAINLEVEL_OUTOF10: 6
PAINLEVEL_OUTOF10: 3
PAINLEVEL_OUTOF10: 7

## 2022-03-08 NOTE — PROGRESS NOTES
Discussed bariatric discharge instructions with patient, allowed time for questions, had patient demonstrate IS, lovenox teaching done and patient voices understanding

## 2022-03-08 NOTE — PROGRESS NOTES
POST-OPERATIVE PROGRESS NOTE    Patient: Stephanie Trent    DATE: 3/8/2022    Surgery: XI ROBOTIC LAPAROSCOPIC GASTRIC SLEEVE, EGD    Subjective:   Pt states that she is doing well. Pt's pain at the surgical site has improved and pain is controlled with pain medication. Tolerating ice chips and voiding spontaneously. Increasing activity without difficulty. Objective:  Vital signs and Nurse's note reviewed  Post-op vital signs: stable    BP (!) 155/86   Pulse 74   Temp 97.5 °F (36.4 °C) (Oral)   Resp 17   Ht 5' 3\" (1.6 m)   LMP 02/08/2022   SpO2 100%   BMI 65.19 kg/m²    Gen:  A&Ox3, NAD  CV: RRR, no m/h/t  Resp: LCTAB, no wheeze or rhonchi  Abd:  Soft, nttp, nd, wounds clean, dry and intact; no erythema induration or exudate  Ext:  Warm, no cyanosis or edema    Assessment:   POD # 0 S/P XI ROBOTIC LAPAROSCOPIC GASTRIC SLEEVE, EGD  Recovering well post-op     Plan:    Continue current care  Pain control  Diet: Bariatric clears  Increase activity as tolerated.     Electronically signed by Daylin Durant DO  on 3/8/2022 at 12:19 AM

## 2022-03-08 NOTE — PROGRESS NOTES
CLINICAL PHARMACY NOTE: MEDS TO BEDS    Total # of Prescriptions Filled: 5   The following medications were delivered to the patient:  · Cyclobenzaprine 10 mg tab  · Oxycodone- acetaminophen 5- 325 mg tab  · Promethazine 25 mg tab  · Ondansetron 4 mg tab  · Enoxaparin 60 mg /0.6ml syr     Additional Documentation:Medications delivered to the patient in room 240 @ 11:01 am, no co pays.

## 2022-03-08 NOTE — PLAN OF CARE
Problem: Pain:  Goal: Pain level will decrease  Description: Pain level will decrease  3/8/2022 1218 by Betty Kirkland RN  Outcome: Met This Shift  3/8/2022 0323 by Marlen Pereira RN  Outcome: Ongoing  Goal: Control of acute pain  Description: Control of acute pain  3/8/2022 1218 by Betty Kirkland RN  Outcome: Met This Shift  3/8/2022 0323 by Marlen Pereira RN  Outcome: Ongoing  Goal: Control of chronic pain  Description: Control of chronic pain  Outcome: Met This Shift     Problem: Falls - Risk of:  Goal: Will remain free from falls  Description: Will remain free from falls  3/8/2022 1218 by Betty Kirkland RN  Outcome: Met This Shift  3/8/2022 0323 by Marlen Pereira RN  Outcome: Ongoing  Goal: Absence of physical injury  Description: Absence of physical injury  Outcome: Met This Shift

## 2022-03-08 NOTE — PROGRESS NOTES
Bariatric Surgery:  Daily Progress Note                 PATIENT NAME: Angela Marlow     TODAY'S DATE: 3/8/2022, 6:26 AM   SUBJECTIVE:     Pt seen and examined at bedside. Vitals stable. Pain controlled. No nausea. TOlerating PO. VOiding spontaneously. OBJECTIVE:   VITALS:  BP (!) 162/79   Pulse 71   Temp 97.5 °F (36.4 °C) (Oral)   Resp 17   Ht 5' 3\" (1.6 m)   LMP 02/08/2022   SpO2 96%   BMI 65.19 kg/m²      INTAKE/OUTPUT:      Intake/Output Summary (Last 24 hours) at 3/8/2022 9911  Last data filed at 3/8/2022 8209  Gross per 24 hour   Intake 2042.33 ml   Output 720 ml   Net 1322.33 ml       PHYSICAL EXAM:  General Appearance: awake, alert, oriented, in no acute distress  HEENT:  Normocephalic, atraumatic, mucus membranes moist   Heart: Heart regular rate and rhythm  Lungs: Unlabored  Abdomen: Soft, nondistended minimally ttp at incision sites, incisions c/d/i   Extremities: No cyanosis, pitting edema, rashes noted. Skin: Skin color, texture, turgor normal. No rashes or lesions.     Data:  CBC with Differential:    Lab Results   Component Value Date    WBC 15.3 03/08/2022    RBC 4.28 03/08/2022    HGB 10.5 03/08/2022    HCT 35.4 03/08/2022     03/08/2022    MCV 82.7 03/08/2022    MCH 24.5 03/08/2022    MCHC 29.7 03/08/2022    RDW 14.4 03/08/2022    LYMPHOPCT 14 03/08/2022    MONOPCT 7 03/08/2022    BASOPCT 0 03/08/2022    MONOSABS 1.07 03/08/2022    LYMPHSABS 2.09 03/08/2022    EOSABS <0.03 03/08/2022    BASOSABS <0.03 03/08/2022    DIFFTYPE NOT REPORTED 05/23/2021     BMP:    Lab Results   Component Value Date     03/07/2022    K 4.1 03/07/2022     03/07/2022    CO2 24 03/07/2022    BUN 10 03/07/2022    LABALBU 3.9 05/23/2021    CREATININE 0.72 03/07/2022    CALCIUM 8.9 03/07/2022    GFRAA >60 03/07/2022    LABGLOM >60 03/07/2022    GLUCOSE 148 03/07/2022       ASSESSMENT:  Active Hospital Problems    Diagnosis Date Noted    S/P bariatric surgery [Z98.84] 03/07/2022       36 y.o. female POD#1 s/p laparoscopic sleeve gastrectomy    Plan:  1. Diet: Soledad Asa clears  2. Analgesia:  Percocet, flexeril, dilaudid prn  3. GI prophylaxis: pepcid  4. DVT prophylaxis:  hep  5. Activity:  Continue to encourage ambulation/activity w/ PT/OT  6.  Continue to encourage incentive spirometry  7. DC planning:  Anticipate home today    Electronically signed by Mj Doran MD  on 3/8/2022 at 6:26 AM

## 2022-03-08 NOTE — ANESTHESIA POSTPROCEDURE EVALUATION
Department of Anesthesiology  Postprocedure Note    Patient: Joan Cage  MRN: [id number]  YOB: 1985  Date of evaluation: 3/8/2022  Time:  1:18 AM     Procedure Summary     Date: 03/07/22 Room / Location: [facility] / [address] [facility]    Anesthesia Start: 3363 Anesthesia Stop: 1465    Procedure: XI ROBOTIC LAPAROSCOPIC GASTRIC SLEEVE, EGD (N/A ) Diagnosis: (MORBID OBESITY, HYPERTENSION)    Surgeons: Randy Long DO Responsible Provider: Mark Grant MD    Anesthesia Type: general ASA Status: 3          Anesthesia Type: general    Sher Phase I: Sher Score: 9    Sher Phase II:      Last vitals: Reviewed and per EMR flowsheets.        Anesthesia Post Evaluation    Patient location during evaluation: PACU  Patient participation: complete - patient participated  Level of consciousness: awake and alert  Airway patency: patent  Nausea & Vomiting: no nausea and no vomiting  Complications: no  Cardiovascular status: blood pressure returned to baseline  Respiratory status: acceptable  Hydration status: euvolemic

## 2022-03-09 ENCOUNTER — TELEPHONE (OUTPATIENT)
Dept: BARIATRICS/WEIGHT MGMT | Age: 37
End: 2022-03-09

## 2022-03-09 LAB — SURGICAL PATHOLOGY REPORT: NORMAL

## 2022-03-09 NOTE — TELEPHONE ENCOUNTER
Post-op outreach call made to pt. Pt reports frequent ambulation around home. Pt wearing abd binder. No complaints of SOB or tachycardia. Laparoscopic incisional sites without problems. Pt has not had a BM since surgery. not Passing flatus. Agrees to use Milk of Magnesia or Miriaax and monitor for BM. Pt reports urine output of at least 4 times in a 24 hour period. Intake is described as water 40 oz,protein. Rates pain as 4 on a 0-10 scale. Pt using pain medication as directed. Allowed pt the opportunity to ask questions. Reminded patient to reference the patient education materials as needed. Reminded pt that they may phone the office or the \"after hours telephone number\"  that is listed in the patient education binder as needed. Pt verbalized understanding. Pt without concerns at this time      Post op office appt date and time reviewed with pt.     Has Lovenox is going well    Checking blood pressure

## 2022-03-17 ENCOUNTER — OFFICE VISIT (OUTPATIENT)
Dept: BARIATRICS/WEIGHT MGMT | Age: 37
End: 2022-03-17

## 2022-03-17 ENCOUNTER — HOSPITAL ENCOUNTER (OUTPATIENT)
Dept: ONCOLOGY | Age: 37
Discharge: HOME | End: 2022-03-17
Attending: SURGERY | Admitting: SURGERY
Payer: MEDICARE

## 2022-03-17 VITALS
BODY MASS INDEX: 51.91 KG/M2 | HEART RATE: 72 BPM | HEIGHT: 63 IN | DIASTOLIC BLOOD PRESSURE: 74 MMHG | SYSTOLIC BLOOD PRESSURE: 133 MMHG | TEMPERATURE: 96.7 F | RESPIRATION RATE: 20 BRPM | WEIGHT: 293 LBS

## 2022-03-17 DIAGNOSIS — E86.0 DEHYDRATION: ICD-10-CM

## 2022-03-17 DIAGNOSIS — Z98.84 S/P BARIATRIC SURGERY: Primary | ICD-10-CM

## 2022-03-17 PROCEDURE — 96361 HYDRATE IV INFUSION ADD-ON: CPT

## 2022-03-17 PROCEDURE — 2580000003 HC RX 258: Performed by: SURGERY

## 2022-03-17 PROCEDURE — 96360 HYDRATION IV INFUSION INIT: CPT

## 2022-03-17 PROCEDURE — 99024 POSTOP FOLLOW-UP VISIT: CPT | Performed by: SURGERY

## 2022-03-17 RX ORDER — M-VIT,TX,IRON,MINS/CALC/FOLIC 27MG-0.4MG
1 TABLET ORAL 2 TIMES DAILY
COMMUNITY

## 2022-03-17 RX ORDER — CALCIUM CARBONATE 500(1250)
500 TABLET ORAL DAILY
COMMUNITY

## 2022-03-17 RX ORDER — 0.9 % SODIUM CHLORIDE 0.9 %
1000 INTRAVENOUS SOLUTION INTRAVENOUS ONCE
Status: COMPLETED | OUTPATIENT
Start: 2022-03-17 | End: 2022-03-17

## 2022-03-17 RX ORDER — SODIUM CHLORIDE 9 MG/ML
INJECTION, SOLUTION INTRAVENOUS CONTINUOUS
Status: DISCONTINUED | OUTPATIENT
Start: 2022-03-17 | End: 2022-03-17 | Stop reason: HOSPADM

## 2022-03-17 RX ADMIN — SODIUM CHLORIDE: 9 INJECTION, SOLUTION INTRAVENOUS at 17:45

## 2022-03-17 RX ADMIN — SODIUM CHLORIDE 1000 ML: 9 INJECTION, SOLUTION INTRAVENOUS at 16:47

## 2022-03-18 DIAGNOSIS — I10 ESSENTIAL HYPERTENSION: ICD-10-CM

## 2022-03-18 RX ORDER — METOPROLOL SUCCINATE 100 MG/1
TABLET, EXTENDED RELEASE ORAL
Qty: 30 TABLET | Refills: 0 | Status: SHIPPED | OUTPATIENT
Start: 2022-03-18 | End: 2022-04-18

## 2022-03-23 NOTE — PROGRESS NOTES
Medical Nutrition Therapy  Metabolic and Bariatric surgery  1 week Post operative follow up         Pt reports: She states she struggles with fluids, we discussed methods to incorporate more fluids and remind her to drink more. Vitals:   Vitals:    03/17/22 1019   BP: 133/74   Site: Left Upper Arm   Position: Sitting   Cuff Size: Large Adult   Pulse: 72   Resp: 20   Temp: 96.7 °F (35.9 °C)   Weight: (!) 355 lb (161 kg)   Height: 5' 3\" (1.6 m)      Body mass index is 62.89 kg/m². Labs reviewed:              Nutrition Assessment:   PES: Inadequate food and beverage intake r/t WLS as evidenced by loss of excess body weight lost 13 lbs over 1 wk.       Goals   60-80gm of protein  48-64oz of fluid       [x] met     []  Not met        Plan:  Pt questions answered re diet advancement;  F/u 5 weeks post-op      Abdelrahman Tena, MS, RD, LD
Incisional  [] Rash   [] Hair Loss  [] Other:        Physical Exam:  /74 (Site: Left Upper Arm, Position: Sitting, Cuff Size: Large Adult)   Pulse 72   Temp 96.7 °F (35.9 °C)   Resp 20   Ht 5' 3\" (1.6 m)   Wt (!) 355 lb (161 kg)   BMI 62.89 kg/m²     Constitutional:  Vital signs are normal. The patient appears well-developed and well-nourished. HEENT:   Head: Normocephalic. Atraumatic  Eyes: pupils are equal and reactive. No scleral icterus is present. Neck: No mass and no thyromegaly present. Cardiovascular: Normal rate, regular rhythm, S1 normal and S2 normal.  Radial pulses present   Pulmonary/Chest: Effort normal and breath sounds normal. No retractions  Abdominal: Soft. Normal appearance. There is no organomegaly. No tenderness. There is no rigidity, no rebound, no guarding and no Ortega's sign. Musculoskeletal:        Right lower leg: Normal. No tenderness and no edema. Left lower leg: Normal. No tenderness and no edema. Neurological: The patient is alert and oriented. Moving all 4 extremities, sensation grossly intact bilateral  Skin: Skin is warm, dry and intact. Psychiatric: The patient has a normal mood and affect.  Speech is normal and behavior is normal. Judgment and thought content normal. Cognition and memory are normal.     Assessment:  1 week post sleeve  Progressing well    Plan:  Fulls  Dietician visit   Ambulation  Lovenox  PPI  Overall doing well  Pathology reviewed with patient
- - -

## 2022-03-24 ENCOUNTER — TELEPHONE (OUTPATIENT)
Dept: BARIATRICS/WEIGHT MGMT | Age: 37
End: 2022-03-24

## 2022-03-24 RX ORDER — LIDOCAINE 4 G/G
1 PATCH TOPICAL DAILY
Qty: 30 PATCH | Refills: 0 | Status: SHIPPED | OUTPATIENT
Start: 2022-03-24 | End: 2022-04-23

## 2022-03-24 RX ORDER — CYCLOBENZAPRINE HCL 10 MG
10 TABLET ORAL 3 TIMES DAILY PRN
Qty: 21 TABLET | Refills: 0 | Status: SHIPPED | OUTPATIENT
Start: 2022-03-24 | End: 2022-04-03

## 2022-03-24 NOTE — TELEPHONE ENCOUNTER
Patient had sleeve on 3-7-22, states when she bends over has level 7 epigastric pain that is intermittent and wants to know if this is normal, no fever, no chills, no flank pain

## 2022-03-30 ENCOUNTER — OFFICE VISIT (OUTPATIENT)
Dept: OBGYN CLINIC | Age: 37
End: 2022-03-30

## 2022-03-30 ENCOUNTER — HOSPITAL ENCOUNTER (OUTPATIENT)
Age: 37
Setting detail: SPECIMEN
Discharge: HOME | End: 2022-03-30

## 2022-03-30 VITALS
HEIGHT: 63 IN | DIASTOLIC BLOOD PRESSURE: 78 MMHG | HEART RATE: 78 BPM | SYSTOLIC BLOOD PRESSURE: 128 MMHG | WEIGHT: 293 LBS | BODY MASS INDEX: 51.91 KG/M2

## 2022-03-30 DIAGNOSIS — R87.615 ENCOUNTER FOR REPEAT PAP SMEAR DUE TO PREVIOUS INSUFFICIENT CERVICAL CELLS: Primary | ICD-10-CM

## 2022-03-30 NOTE — PROGRESS NOTES
Alejandro Danny is here for a repap since her Pap on 1/11/22 had insufficient cells due to being on her menses. Will repeat Pap today. No charge visit.      Jocelyn Mark MD

## 2022-04-06 ENCOUNTER — CLINICAL DOCUMENTATION (OUTPATIENT)
Dept: OBGYN CLINIC | Age: 37
End: 2022-04-06

## 2022-04-06 DIAGNOSIS — R87.619 ATYPICAL GLANDULAR CELLS ON CERVICAL PAP SMEAR: ICD-10-CM

## 2022-04-06 LAB — CYTOLOGY REPORT: NORMAL

## 2022-04-08 ENCOUNTER — OFFICE VISIT (OUTPATIENT)
Dept: BARIATRICS/WEIGHT MGMT | Age: 37
End: 2022-04-08

## 2022-04-08 VITALS
HEART RATE: 74 BPM | WEIGHT: 293 LBS | SYSTOLIC BLOOD PRESSURE: 120 MMHG | BODY MASS INDEX: 51.91 KG/M2 | HEIGHT: 63 IN | DIASTOLIC BLOOD PRESSURE: 69 MMHG

## 2022-04-08 DIAGNOSIS — E66.01 MORBID OBESITY WITH BMI OF 60.0-69.9, ADULT (HCC): ICD-10-CM

## 2022-04-08 DIAGNOSIS — Z98.84 S/P LAPAROSCOPIC SLEEVE GASTRECTOMY: ICD-10-CM

## 2022-04-08 DIAGNOSIS — R73.03 PRE-DIABETES: ICD-10-CM

## 2022-04-08 DIAGNOSIS — I10 ESSENTIAL HYPERTENSION: Primary | ICD-10-CM

## 2022-04-08 DIAGNOSIS — E78.5 HYPERLIPIDEMIA, UNSPECIFIED HYPERLIPIDEMIA TYPE: ICD-10-CM

## 2022-04-08 PROCEDURE — 99024 POSTOP FOLLOW-UP VISIT: CPT | Performed by: NURSE PRACTITIONER

## 2022-04-08 NOTE — PROGRESS NOTES
Medical Nutrition Therapy  Metabolic and Bariatric surgery  1 month after surgery follow up note         Pt reports: She has been struggling with protein intake. She states there are days she feels a bit dizzy or tired. She is at least drinking 2 protein shakes per day but feels lost otherwise on what to eat, she states she lost her education binder. Vitals:   Vitals:    04/08/22 1123   BP: 120/69   Site: Right Lower Arm   Position: Sitting   Cuff Size: Large Adult   Pulse: 74   Weight: (!) 346 lb (156.9 kg)   Height: 5' 3\" (1.6 m)      Body mass index is 61.29 kg/m². Labs reviewed:     Multivitamin/mineral intake: Barislim daily  Calcium intake: Bariatric Fusion Daily    Other: We spoke about the importance of staying well hydrated by sipping on fluids throughout the day and eating small, frequent, high-protein meals and snacks. I also recommended adding some carbohydrates into meals, especially whole grains, to prevent low blood sugar. Nutrition Assessment:   PES: Inadequate food and beverage intake r/t WLS as evidenced by loss of excess body weight lost 22 lbs over 1 mo. Goals   60-80gm of protein  48-64oz of fluid     [] met     [x]  Not met         Plan:  Incorporate at least 2 more meals per day with a protein source and a whole grain carb.  Questions answered re diet advancement and tolerance  F/u 3 months after surgery         Sher Tsang, MS, RD, LD

## 2022-04-08 NOTE — PROGRESS NOTES
Post-op Bariatric Surgery Note    Subjective     Patient is 1 month s/p laparoscopic sleeve gastrectomy, down 22 lbs. Overall, doing well. Incisions well healed. Consistent use of MVI and calcium. Light on protein and fluid intake. Physical activity includes walking. Some constipation. Using Miralax and Ducolax prn. Allergies: Allergies   Allergen Reactions    Vancomycin Itching     Patient felt like skin was on fire. Past Medical History:     Past Medical History:   Diagnosis Date    Class 3 severe obesity due to excess calories without serious comorbidity with body mass index (BMI) of 60.0 to 69.9 in adult [facility]) 2019    Essential hypertension 2019    managed by Dr. Sravan Romero Hyperlipidemia 2016    Hyperlipidemia     Pneumonia due to COVID-19 virus 2021    SOB, fatigue, cough, diarrhea, dizziness x 1 week; no hospitalization    Prediabetes     Under care of team 2022    PCP: Lul Garcia/Macho, last visit-patient goes 2022 for clearance   .     Past Surgical History:  Past Surgical History:   Procedure Laterality Date     SECTION      INTRAUTERINE DEVICE INSERTION  2021    Mirena NDC 79438-781-37 Lot [id number] Exp 2023    SLEEVE GASTRECTOMY  2022    ROBOTIC LAPAROSCOPIC GASTRIC SLEEVE, EGD    SLEEVE GASTRECTOMY N/A 3/7/2022    XI ROBOTIC LAPAROSCOPIC GASTRIC SLEEVE, EGD performed by James Macias DO at [facility] N/A 2021    EGD BIOPSY performed by James Macias DO at [address] EXTRACTION         Family History:  Family History   Problem Relation Age of Onset    Heart Disease Mother     Other Mother     Heart Disease Father     Other Father     Depression Mother     Diabetes Mother     High Blood Pressure Mother     Diabetes Maternal Grandmother     High Blood Pressure Maternal Grandmother     Kidney Disease Maternal Grandmother     Cancer Maternal Grandfather         prostate    Diabetes Maternal Grandfather     Heart Disease Maternal Grandfather     High Blood Pressure Maternal Grandfather     Cancer Paternal Grandmother         breast and brain       Social History:  Social History     Socioeconomic History    Marital status: Single     Spouse name: Not on file    Number of children: 1    Years of education: Not on file    Highest education level: Not on file   Occupational History    Not on file   Tobacco Use    Smoking status: Never Smoker    Smokeless tobacco: Never Used   Vaping Use    Vaping Use: Never used   Substance and Sexual Activity    Alcohol use: No    Drug use: No    Sexual activity: Not Currently   Other Topics Concern    Not on file   Social History Narrative    ** Merged History Encounter **          Social Determinants of Health     Financial Resource Strain: Low Risk     Difficulty of Paying Living Expenses: Not hard at all   Food Insecurity: No Food Insecurity    Worried About [address] [email or web address] in the Last Year: Never true    [facility] [employer] in the Last Year: Never true   Transportation Needs:     Lack of Transportation (Medical): Not on file    Lack of Transportation (Non-Medical):  Not on file   Physical Activity:     Days of Exercise per Week: Not on file    Minutes of Exercise per Session: Not on file   Stress:     Feeling of Stress : Not on file   Social Connections:     Frequency of Communication with Friends and Family: Not on file    Frequency of Social Gatherings with Friends and Family: Not on file    Attends Christianity Services: Not on file    Active Member of Clubs or Organizations: Not on file    Attends Club or Organization Meetings: Not on file    Marital Status: Not on file   Intimate Partner Violence:     Fear of Current or Ex-Partner: Not on file    Emotionally Abused: Not on file    Physically Abused: Not on file    Sexually Abused: Not on file Housing Stability:     Unable to Pay for Housing in the Last Year: Not on file    Number of Places Lived in the Last Year: Not on file    Unstable Housing in the Last Year: Not on file       Current Medications:  Current Outpatient Medications   Medication Sig Dispense Refill    lidocaine 4 % external patch Place 1 patch onto the skin daily 30 patch 0    metoprolol succinate (TOPROL XL) 100 MG extended release tablet 1 po qd 30 tablet 0    Multiple Vitamins-Minerals (THERAPEUTIC MULTIVITAMIN-MINERALS) tablet Take 1 tablet by mouth in the morning and at bedtime MVI from [address] [place] [address] calcium carbonate (OSCAL) 500 MG TABS tablet Take 500 mg by mouth daily Bariatric fusion      promethazine (PHENERGAN) 25 MG tablet Take 1 tablet by mouth every 6 hours as needed for Nausea 30 tablet 0    ondansetron (ZOFRAN) 4 MG tablet Take 1 tablet by mouth every 8 hours as needed for Nausea or Vomiting 30 tablet 0     Current Facility-Administered Medications   Medication Dose Route Frequency Provider Last Rate Last Admin    levonorgestrel (MIRENA) IUD 52 mg 1 each  1 each IntraUTERine Once Dewey Irizarry MD   1 each at 07/02/21 1144    levonorgestrel (MIRENA) IUD 52 mg 1 each  1 each IntraUTERine Once Dewey Irizarry MD   1 each at 01/05/21 1210       Vital Signs:  /69 (Site: Right Lower Arm, Position: Sitting, Cuff Size: Large Adult)   Pulse 74   Ht 5' 3\" (1.6 m)   Wt (!) 346 lb (156.9 kg)   BMI 61.29 kg/m²     BMI/Height/Weight:  Body mass index is 61.29 kg/m². Review of Systems - A review of systems was performed. All was negative unless otherwise documented in HPI. Constitutional: Negative for fever, chills and diaphoresis. HENT: Negative for hearing loss and trouble swallowing. Eyes: Negative for photophobia and visual disturbance. Respiratory: Negative for cough, shortness of breath and wheezing. Cardiovascular: Negative for chest pain and palpitations. Gastrointestinal: Negative for nausea, vomiting, abdominal pain, diarrhea, blood in stool and abdominal distention. Positive for constipation. Endocrine: Negative for polydipsia, polyphagia and polyuria. Genitourinary: Negative for dysuria, frequency, hematuria and difficulty urinating. Musculoskeletal: Negative for myalgias, joint swelling. Skin: Negative for pallor and rash. Neurological: Negative for dizziness, tremors, light-headedness and headaches. Psychiatric/Behavioral: Negative for sleep disturbance and dysphoric mood. Objective:      Physical Exam   Vital signs reviewed. General: Well-developed and well-nourished. No acute distress. Skin: Warm, dry and intact. HEENT: Normocephalic. EOMs intact. Conjunctivae normal. Neck supple. Cardiovascular: Normal rate, regular rhythm. Pulmonary/Chest: Normal effort. Lungs clear to auscultation. No rales, rhonchi or wheezing. Abdominal: Positive bowel sounds. Soft, nontender. Nondistended. No rigidity, rebound, or guarding. Incisions well healed. Musculoskeletal: Movement x4. No edema. Neurological: Gait normal. Alert and oriented to person, place, and time. Psychiatric: Normal mood and affect. Speech and behavior normal. Judgment and thought content normal. Cognition and memory intact. Assessment:       Diagnosis Orders   1. Essential hypertension  CBC with Auto Differential    Comprehensive Metabolic Panel    Ferritin    Hemoglobin A1C    Iron and TIBC    Lipid Panel    Magnesium    PTH, Intact    TSH    Vitamin A    Vitamin B1    Vitamin B12 & Folate    Zinc    Vitamin D 25 Hydroxy   2. Morbid obesity with BMI of 60.0-69.9, adult (HCC)  CBC with Auto Differential    Comprehensive Metabolic Panel    Ferritin    Hemoglobin A1C    Iron and TIBC    Lipid Panel    Magnesium    PTH, Intact    TSH    Vitamin A    Vitamin B1    Vitamin B12 & Folate    Zinc    Vitamin D 25 Hydroxy   3.  Hyperlipidemia, unspecified hyperlipidemia type  CBC with Auto Differential    Comprehensive Metabolic Panel    Ferritin    Hemoglobin A1C    Iron and TIBC    Lipid Panel    Magnesium    PTH, Intact    TSH    Vitamin A    Vitamin B1    Vitamin B12 & Folate    Zinc    Vitamin D 25 Hydroxy   4. Pre-diabetes  CBC with Auto Differential    Comprehensive Metabolic Panel    Ferritin    Hemoglobin A1C    Iron and TIBC    Lipid Panel    Magnesium    PTH, Intact    TSH    Vitamin A    Vitamin B1    Vitamin B12 & Folate    Zinc    Vitamin D 25 Hydroxy   5. S/P laparoscopic sleeve gastrectomy  CBC with Auto Differential    Comprehensive Metabolic Panel    Ferritin    Hemoglobin A1C    Iron and TIBC    Lipid Panel    Magnesium    PTH, Intact    TSH    Vitamin A    Vitamin B1    Vitamin B12 & Folate    Zinc    Vitamin D 25 Hydroxy       Plan:    Dietitian visit today. Patient to continue to increase protein to obtain 60-80g/day, at least 48-64oz of fluid daily, and gradually increase exercise regimen. Preventive bowel regimen discussed. Follow-up  Return in about 2 months (around 6/8/2022). Orders this encounter:  Orders Placed This Encounter   Procedures    CBC with Auto Differential     Standing Status:   Future     Standing Expiration Date:   4/8/2023    Comprehensive Metabolic Panel     Standing Status:   Future     Standing Expiration Date:   4/8/2023    Ferritin     Standing Status:   Future     Standing Expiration Date:   4/8/2023    Hemoglobin A1C     Standing Status:   Future     Standing Expiration Date:   4/8/2023    Iron and TIBC     Standing Status:   Future     Standing Expiration Date:   4/8/2023     Order Specific Question:   Is Patient Fasting? Answer:   no     Order Specific Question:   No of Hours?      Answer:   0    Lipid Panel     Standing Status:   Future     Standing Expiration Date:   4/8/2023     Order Specific Question:   Is Patient Fasting?/# of Hours     Answer:   15    Magnesium     Standing Status:   Future     Standing Expiration Date:   4/8/2023    PTH, Intact     Standing Status:   Future     Standing Expiration Date:   4/8/2023    TSH     Standing Status:   Future     Standing Expiration Date:   4/8/2023    Vitamin A     Standing Status:   Future     Standing Expiration Date:   4/8/2023    Vitamin B1     Standing Status:   Future     Standing Expiration Date:   4/8/2023    Vitamin B12 & Folate     Standing Status:   Future     Standing Expiration Date:   4/8/2023    Zinc     Standing Status:   Future     Standing Expiration Date:   4/8/2023    Vitamin D 25 Hydroxy     Standing Status:   Future     Standing Expiration Date:   4/8/2023       Prescriptions this encounter:  No orders of the defined types were placed in this encounter.       Electronically signed by:  Khadijah Mayers CNP

## 2022-04-16 PROBLEM — E86.0 DEHYDRATION: Status: RESOLVED | Noted: 2022-03-17 | Resolved: 2022-04-16

## 2022-04-18 DIAGNOSIS — I10 ESSENTIAL HYPERTENSION: ICD-10-CM

## 2022-04-18 RX ORDER — METOPROLOL SUCCINATE 100 MG/1
TABLET, EXTENDED RELEASE ORAL
Qty: 30 TABLET | Refills: 5 | Status: SHIPPED | OUTPATIENT
Start: 2022-04-18 | End: 2022-04-20 | Stop reason: SDUPTHER

## 2022-04-18 NOTE — TELEPHONE ENCOUNTER
Ruby Benson is calling to request a refill on the following medication(s):    Last Visit Date (If Applicable):  8/6/4652    Next Visit Date:    Visit date not found    Medication Request:  Requested Prescriptions     Pending Prescriptions Disp Refills    metoprolol succinate (TOPROL XL) 100 MG extended release tablet [Pharmacy Med Name: METOPROLOL SUCC  MG TAB] 30 tablet 0     Sig: TAKE ONE TABLET BY MOUTH DAILY

## 2022-04-20 DIAGNOSIS — I10 ESSENTIAL HYPERTENSION: ICD-10-CM

## 2022-04-20 RX ORDER — METOPROLOL SUCCINATE 100 MG/1
TABLET, EXTENDED RELEASE ORAL
Qty: 30 TABLET | Refills: 5 | Status: SHIPPED | OUTPATIENT
Start: 2022-04-20 | End: 2022-07-31 | Stop reason: SDUPTHER

## 2022-04-20 NOTE — TELEPHONE ENCOUNTER
Pharm requested    Health Maintenance   Topic Date Due    Varicella vaccine (1 of 2 - 2-dose childhood series) Never done    COVID-19 Vaccine (1) Never done    Hepatitis C screen  Never done    A1C test (Diabetic or Prediabetic)  03/06/2022    Flu vaccine (Season Ended) 09/01/2022    Depression Screen  02/01/2023    Potassium  03/08/2023    Creatinine  03/08/2023    Cervical cancer screen  03/30/2027    DTaP/Tdap/Td vaccine (3 - Td or Tdap) 04/26/2028    HIV screen  Addressed    Hepatitis A vaccine  Aged Out    Hepatitis B vaccine  Aged Out    Hib vaccine  Aged Out    Meningococcal (ACWY) vaccine  Aged Out    Pneumococcal 0-64 years Vaccine  Aged Out             (applicable per patient's age: Cancer Screenings, Depression Screening, Fall Risk Screening, Immunizations)    Hemoglobin A1C (%)   Date Value   03/06/2021 6.2 (H)   10/30/2019 5.7   10/05/2018 5.9     LDL Cholesterol (mg/dL)   Date Value   03/06/2021 137 (H)     AST (U/L)   Date Value   05/23/2021 18     ALT (U/L)   Date Value   05/23/2021 20     BUN (mg/dL)   Date Value   03/08/2022 9      (goal A1C is < 7)   (goal LDL is <100) need 30-50% reduction from baseline     BP Readings from Last 3 Encounters:   04/08/22 120/69   03/30/22 128/78   03/17/22 133/74    (goal /80)      All Future Testing planned in [facility]:  Lab Frequency Next Occurrence   Sleep Study with PAP Titration Once 06/17/2021   COVID-19 Once 08/16/2021   PAP Smear Once 02/11/2022   PAP Smear Once 04/30/2022   CBC with Auto Differential Once 05/19/2022   Comprehensive Metabolic Panel Once 86/51/4543   Ferritin Once 05/19/2022   Hemoglobin A1C Once 05/19/2022   Iron and TIBC Once 05/19/2022   Lipid Panel Once 05/19/2022   Magnesium Once 05/19/2022   PTH, Intact Once 05/19/2022   TSH Once 05/19/2022   Vitamin A Once 05/19/2022   Vitamin B1 Once 05/19/2022   Vitamin B12 & Folate Once 05/19/2022   Zinc Once 05/19/2022   Vitamin D 25 Hydroxy Once 05/19/2022       Next Visit Date:  Future Appointments   Date Time Provider Rhys Brenda   6/8/2022  2:00 PM JULIETA Avalos - CNP Weight Mgmt [id number]            Patient Active Problem List:     Hyperlipidemia     Pre-diabetes     Morbid obesity with BMI of 60.0-69.9, adult ([facility] [address]     Essential hypertension     Atypical squamous cells cannot exclude high grade squamous intraepithelial lesion on cytologic smear of cervix ([facility])     Vitamin D deficiency     IUD migration     Esophageal dysphagia     Antral gastritis     S/P laparoscopic sleeve gastrectomy     Atypical glandular cells on cervical Pap smear

## 2022-06-07 ENCOUNTER — HOSPITAL ENCOUNTER (OUTPATIENT)
Age: 37
Discharge: HOME | End: 2022-06-07
Payer: MEDICARE

## 2022-06-07 DIAGNOSIS — E78.5 HYPERLIPIDEMIA, UNSPECIFIED HYPERLIPIDEMIA TYPE: ICD-10-CM

## 2022-06-07 DIAGNOSIS — R73.03 PRE-DIABETES: ICD-10-CM

## 2022-06-07 DIAGNOSIS — I10 ESSENTIAL HYPERTENSION: ICD-10-CM

## 2022-06-07 DIAGNOSIS — Z98.84 S/P LAPAROSCOPIC SLEEVE GASTRECTOMY: ICD-10-CM

## 2022-06-07 DIAGNOSIS — E66.01 MORBID OBESITY WITH BMI OF 60.0-69.9, ADULT (HCC): ICD-10-CM

## 2022-06-07 LAB
ABSOLUTE EOS #: 0.1 K/UL (ref 0–0.4)
ABSOLUTE LYMPH #: 2.3 K/UL (ref 1–4.8)
ABSOLUTE MONO #: 0.7 K/UL (ref 0.1–1.3)
ALBUMIN SERPL-MCNC: 3.7 G/DL (ref 3.5–5.2)
ALP BLD-CCNC: 65 U/L (ref 35–104)
ALT SERPL-CCNC: 11 U/L (ref 5–33)
ANION GAP SERPL CALCULATED.3IONS-SCNC: 8 MMOL/L (ref 9–17)
AST SERPL-CCNC: 11 U/L
BASOPHILS # BLD: 1 % (ref 0–2)
BASOPHILS ABSOLUTE: 0 K/UL (ref 0–0.2)
BILIRUB SERPL-MCNC: 0.34 MG/DL (ref 0.3–1.2)
BUN BLDV-MCNC: 8 MG/DL (ref 6–20)
CALCIUM SERPL-MCNC: 8.9 MG/DL (ref 8.6–10.4)
CHLORIDE BLD-SCNC: 106 MMOL/L (ref 98–107)
CHOLESTEROL/HDL RATIO: 4
CHOLESTEROL: 167 MG/DL
CO2: 26 MMOL/L (ref 20–31)
CREAT SERPL-MCNC: 0.64 MG/DL (ref 0.5–0.9)
EOSINOPHILS RELATIVE PERCENT: 1 % (ref 0–4)
ESTIMATED AVERAGE GLUCOSE: 114 MG/DL
FERRITIN: 21 NG/ML (ref 13–150)
FOLATE: 18 NG/ML
GFR AFRICAN AMERICAN: >60 ML/MIN
GFR NON-AFRICAN AMERICAN: >60 ML/MIN
GFR SERPL CREATININE-BSD FRML MDRD: ABNORMAL ML/MIN/{1.73_M2}
GLUCOSE BLD-MCNC: 86 MG/DL (ref 70–99)
HBA1C MFR BLD: 5.6 % (ref 4–6)
HCT VFR BLD CALC: 32.9 % (ref 36–46)
HDLC SERPL-MCNC: 42 MG/DL
HEMOGLOBIN: 10.5 G/DL (ref 12–16)
IRON SATURATION: 12 % (ref 20–55)
IRON: 39 UG/DL (ref 37–145)
LDL CHOLESTEROL: 113 MG/DL (ref 0–130)
LYMPHOCYTES # BLD: 28 % (ref 24–44)
MAGNESIUM: 2 MG/DL (ref 1.6–2.6)
MCH RBC QN AUTO: 25 PG (ref 26–34)
MCHC RBC AUTO-ENTMCNC: 31.9 G/DL (ref 31–37)
MCV RBC AUTO: 78.3 FL (ref 80–100)
MONOCYTES # BLD: 9 % (ref 1–7)
PDW BLD-RTO: 17.8 % (ref 11.5–14.9)
PLATELET # BLD: 405 K/UL (ref 150–450)
PMV BLD AUTO: 8.5 FL (ref 6–12)
POTASSIUM SERPL-SCNC: 4.3 MMOL/L (ref 3.7–5.3)
PTH INTACT: 46.56 PG/ML (ref 15–65)
RBC # BLD: 4.2 M/UL (ref 4–5.2)
SEG NEUTROPHILS: 61 % (ref 36–66)
SEGMENTED NEUTROPHILS ABSOLUTE COUNT: 5 K/UL (ref 1.3–9.1)
SODIUM BLD-SCNC: 140 MMOL/L (ref 135–144)
TOTAL IRON BINDING CAPACITY: 318 UG/DL (ref 250–450)
TOTAL PROTEIN: 6.7 G/DL (ref 6.4–8.3)
TRIGL SERPL-MCNC: 61 MG/DL
TSH SERPL DL<=0.05 MIU/L-ACNC: 2.26 UIU/ML (ref 0.3–5)
UNSATURATED IRON BINDING CAPACITY: 279 UG/DL (ref 112–347)
VITAMIN B-12: 454 PG/ML (ref 232–1245)
VITAMIN D 25-HYDROXY: 29.6 NG/ML
WBC # BLD: 8.2 K/UL (ref 3.5–11)

## 2022-06-07 PROCEDURE — 82746 ASSAY OF FOLIC ACID SERUM: CPT

## 2022-06-07 PROCEDURE — 84630 ASSAY OF ZINC: CPT

## 2022-06-07 PROCEDURE — 84425 ASSAY OF VITAMIN B-1: CPT

## 2022-06-07 PROCEDURE — 84443 ASSAY THYROID STIM HORMONE: CPT

## 2022-06-07 PROCEDURE — 82306 VITAMIN D 25 HYDROXY: CPT

## 2022-06-07 PROCEDURE — 83735 ASSAY OF MAGNESIUM: CPT

## 2022-06-07 PROCEDURE — 83036 HEMOGLOBIN GLYCOSYLATED A1C: CPT

## 2022-06-07 PROCEDURE — 83550 IRON BINDING TEST: CPT

## 2022-06-07 PROCEDURE — 36415 COLL VENOUS BLD VENIPUNCTURE: CPT

## 2022-06-07 PROCEDURE — 83540 ASSAY OF IRON: CPT

## 2022-06-07 PROCEDURE — 82728 ASSAY OF FERRITIN: CPT

## 2022-06-07 PROCEDURE — 84590 ASSAY OF VITAMIN A: CPT

## 2022-06-07 PROCEDURE — 80061 LIPID PANEL: CPT

## 2022-06-07 PROCEDURE — 85025 COMPLETE CBC W/AUTO DIFF WBC: CPT

## 2022-06-07 PROCEDURE — 83970 ASSAY OF PARATHORMONE: CPT

## 2022-06-07 PROCEDURE — 80053 COMPREHEN METABOLIC PANEL: CPT

## 2022-06-07 PROCEDURE — 82607 VITAMIN B-12: CPT

## 2022-06-08 DIAGNOSIS — E55.9 VITAMIN D DEFICIENCY: Primary | ICD-10-CM

## 2022-06-08 RX ORDER — ERGOCALCIFEROL 1.25 MG/1
50000 CAPSULE ORAL WEEKLY
Qty: 8 CAPSULE | Refills: 0 | Status: SHIPPED | OUTPATIENT
Start: 2022-06-08 | End: 2022-07-28

## 2022-06-09 ENCOUNTER — OFFICE VISIT (OUTPATIENT)
Dept: BARIATRICS/WEIGHT MGMT | Age: 37
End: 2022-06-09
Payer: MEDICARE

## 2022-06-09 VITALS
DIASTOLIC BLOOD PRESSURE: 72 MMHG | HEIGHT: 63 IN | WEIGHT: 293 LBS | SYSTOLIC BLOOD PRESSURE: 118 MMHG | BODY MASS INDEX: 51.91 KG/M2 | HEART RATE: 62 BPM

## 2022-06-09 DIAGNOSIS — R73.03 PRE-DIABETES: ICD-10-CM

## 2022-06-09 DIAGNOSIS — E66.01 MORBID OBESITY WITH BMI OF 50.0-59.9, ADULT (HCC): ICD-10-CM

## 2022-06-09 DIAGNOSIS — I10 ESSENTIAL HYPERTENSION: Primary | ICD-10-CM

## 2022-06-09 DIAGNOSIS — Z98.84 S/P LAPAROSCOPIC SLEEVE GASTRECTOMY: ICD-10-CM

## 2022-06-09 DIAGNOSIS — E78.5 HYPERLIPIDEMIA, UNSPECIFIED HYPERLIPIDEMIA TYPE: ICD-10-CM

## 2022-06-09 LAB — ZINC: 83 UG/DL (ref 60–120)

## 2022-06-09 PROCEDURE — 1036F TOBACCO NON-USER: CPT | Performed by: NURSE PRACTITIONER

## 2022-06-09 PROCEDURE — G8427 DOCREV CUR MEDS BY ELIG CLIN: HCPCS | Performed by: NURSE PRACTITIONER

## 2022-06-09 PROCEDURE — G8417 CALC BMI ABV UP PARAM F/U: HCPCS | Performed by: NURSE PRACTITIONER

## 2022-06-09 PROCEDURE — 99213 OFFICE O/P EST LOW 20 MIN: CPT | Performed by: NURSE PRACTITIONER

## 2022-06-09 RX ORDER — FAMOTIDINE 20 MG/1
20 TABLET, FILM COATED ORAL DAILY
Qty: 30 TABLET | Refills: 3 | Status: SHIPPED
Start: 2022-06-09 | End: 2022-09-22 | Stop reason: ALTCHOICE

## 2022-06-09 NOTE — PROGRESS NOTES
Medical Nutrition Therapy  Metabolic and Bariatric surgery  3 month follow up        Pt reports: She is still vomiting after meals, she states a couple times per week. She notices beef, pork, and sometimes chicken are the reasons she throws up. She states she does drink a protein shake frequently due to lack of protein tolerance. She states she has script for pepcid put in at today's apt but will call if symptoms are not relieve by meds. Vitals:   Vitals:    06/09/22 1558   BP: 118/72   Site: Right Upper Arm   Position: Sitting   Cuff Size: Large Adult   Pulse: 62   Weight: (!) 326 lb (147.9 kg)   Height: 5' 3\" (1.6 m)      Body mass index is 57.75 kg/m². Labs reviewed:     Multivitamin/mineral intake:MVI daily  Calcium intake:   Ca carbonate  Other:            Nutrition Assessment:   PES: Inadequate food and beverage intake r/t WLS as evidenced by loss of excess body weight lost 42 lbs over 3 mo. Goals   60-80gm of protein  48-64oz of fluid  Vitamin adherance  Basic adherance to WLS behavious and this document has been scanned into the chart.        [x] met     []  Not met        Plan:   F/u 6 months after surgery         Bhargavi Centeno, MS, RD, LD

## 2022-06-09 NOTE — PROGRESS NOTES
Post-op Bariatric Surgery Note    Subjective     Patient is 3 months s/p laparoscopic sleeve gastrectomy, down 22 lbs. Overall, doing well. Incisions well healed. Consistent use of MVI and calcium. Having intolerance to meat, particularly beef. Sometimes feels as if it gets stuck. No problems with vegetables, liquids, etc.  Using OTC Pepcid prn. Physical activity includes walking. No pain or current issues. Allergies: Allergies   Allergen Reactions    Vancomycin Itching     Patient felt like skin was on fire. Past Medical History:     Past Medical History:   Diagnosis Date    Class 3 severe obesity due to excess calories without serious comorbidity with body mass index (BMI) of 60.0 to 69.9 in adult [facility]) 2019    Essential hypertension 2019    managed by Dr. Anita Nails Hyperlipidemia 2016    Hyperlipidemia     Pneumonia due to COVID-19 virus 2021    SOB, fatigue, cough, diarrhea, dizziness x 1 week; no hospitalization    Prediabetes     Under care of team 2022    PCP: Lul Murphy/Macho, last visit-patient goes 2022 for clearance   .     Past Surgical History:  Past Surgical History:   Procedure Laterality Date     SECTION      INTRAUTERINE DEVICE INSERTION  2021    [address] [id number] Lot [id number] Exp 2023    SLEEVE GASTRECTOMY  2022    ROBOTIC LAPAROSCOPIC GASTRIC SLEEVE, EGD    SLEEVE GASTRECTOMY N/A 3/7/2022    XI ROBOTIC LAPAROSCOPIC GASTRIC SLEEVE, EGD performed by Angeli Lin DO at [place] [address]. N/A 2021    EGD BIOPSY performed by Angeli Lin DO at [address] [place] [address] EXTRACTION         Family History:  Family History   Problem Relation Age of Onset    Heart Disease Mother     Other Mother     Heart Disease Father     Other Father     Depression Mother     Diabetes Mother     High Blood Pressure Mother     Diabetes Maternal Grandmother     High Blood Pressure Maternal Grandmother     Kidney Disease Maternal Grandmother     Cancer Maternal Grandfather         prostate    Diabetes Maternal Grandfather     Heart Disease Maternal Grandfather     High Blood Pressure Maternal Grandfather     Cancer Paternal Grandmother         breast and brain       Social History:  Social History     Socioeconomic History    Marital status: Single     Spouse name: Not on file    Number of children: 1    Years of education: Not on file    Highest education level: Not on file   Occupational History    Not on file   Tobacco Use    Smoking status: Never Smoker    Smokeless tobacco: Never Used   Vaping Use    Vaping Use: Never used   Substance and Sexual Activity    Alcohol use: No    Drug use: No    Sexual activity: Not Currently   Other Topics Concern    Not on file   Social History Narrative    ** Merged History Encounter **          Social Determinants of Health     Financial Resource Strain: Low Risk     Difficulty of Paying Living Expenses: Not hard at all   Food Insecurity: No Food Insecurity    Worried About Running Out of Food in the Last Year: Never true    Lyndon of Food in the Last Year: Never true   Transportation Needs:     Lack of Transportation (Medical): Not on file    Lack of Transportation (Non-Medical):  Not on file   Physical Activity:     Days of Exercise per Week: Not on file    Minutes of Exercise per Session: Not on file   Stress:     Feeling of Stress : Not on file   Social Connections:     Frequency of Communication with Friends and Family: Not on file    Frequency of Social Gatherings with Friends and Family: Not on file    Attends Buddhism Services: Not on file    Active Member of Clubs or Organizations: Not on file    Attends Club or Organization Meetings: Not on file    Marital Status: Not on file   Intimate Partner Violence:     Fear of Current or Ex-Partner: Not on file    Emotionally Abused: Not on file    Physically Abused: Not on file    Sexually Abused: Not on file   Housing Stability:     Unable to Pay for Housing in the Last Year: Not on file    Number of Micheal in the Last Year: Not on file    Unstable Housing in the Last Year: Not on file       Current Medications:  Current Outpatient Medications   Medication Sig Dispense Refill    famotidine (PEPCID) 20 MG tablet Take 1 tablet by mouth daily 30 tablet 3    vitamin D (ERGOCALCIFEROL) 1.25 MG (65693 UT) CAPS capsule Take 1 capsule by mouth once a week for 8 doses 8 capsule 0    metoprolol succinate (TOPROL XL) 100 MG extended release tablet TAKE ONE TABLET BY MOUTH DAILY 30 tablet 5    Multiple Vitamins-Minerals (THERAPEUTIC MULTIVITAMIN-MINERALS) tablet Take 1 tablet by mouth in the morning and at bedtime MVI from [facility]      calcium carbonate (OSCAL) 500 MG TABS tablet Take 500 mg by mouth daily Bariatric fusion      promethazine (PHENERGAN) 25 MG tablet Take 1 tablet by mouth every 6 hours as needed for Nausea 30 tablet 0    ondansetron (ZOFRAN) 4 MG tablet Take 1 tablet by mouth every 8 hours as needed for Nausea or Vomiting 30 tablet 0     Current Facility-Administered Medications   Medication Dose Route Frequency Provider Last Rate Last Admin    levonorgestrel (MIRENA) IUD 52 mg 1 each  1 each IntraUTERine Once Hema MD Janice   1 each at 07/02/21 1144    levonorgestrel (MIRENA) IUD 52 mg 1 each  1 each IntraUTERine Once Hema MD Janice   1 each at 01/05/21 1210       Vital Signs:  /72 (Site: Right Upper Arm, Position: Sitting, Cuff Size: Large Adult)   Pulse 62   Ht 5' 3\" (1.6 m)   Wt (!) 326 lb (147.9 kg)   BMI 57.75 kg/m²     BMI/Height/Weight:  Body mass index is 57.75 kg/m². Review of Systems - A review of systems was performed. All was negative unless otherwise documented in HPI. Constitutional: Negative for fever, chills and diaphoresis.    HENT: Negative for hearing loss and trouble swallowing. Eyes: Negative for photophobia and visual disturbance. Respiratory: Negative for cough, shortness of breath and wheezing. Cardiovascular: Negative for chest pain and palpitations. Gastrointestinal: Negative for nausea, vomiting, abdominal pain, diarrhea, blood in stool and abdominal distention. Positive for constipation. Endocrine: Negative for polydipsia, polyphagia and polyuria. Genitourinary: Negative for dysuria, frequency, hematuria and difficulty urinating. Musculoskeletal: Negative for myalgias, joint swelling. Skin: Negative for pallor and rash. Neurological: Negative for dizziness, tremors, light-headedness and headaches. Psychiatric/Behavioral: Negative for sleep disturbance and dysphoric mood. Objective:      Physical Exam   Vital signs reviewed. General: Well-developed and well-nourished. No acute distress. Skin: Warm, dry and intact. HEENT: Normocephalic. EOMs intact. Conjunctivae normal. Neck supple. Cardiovascular: Normal rate, regular rhythm. Pulmonary/Chest: Normal effort. Lungs clear to auscultation. No rales, rhonchi or wheezing. Abdominal: Positive bowel sounds. Soft, nontender. Nondistended. No rigidity, rebound, or guarding. Incisions well healed. Musculoskeletal: Movement x4. No edema. Neurological: Gait normal. Alert and oriented to person, place, and time. Psychiatric: Normal mood and affect. Speech and behavior normal. Judgment and thought content normal. Cognition and memory intact. Assessment:       Diagnosis Orders   1. Essential hypertension  CBC with Auto Differential    Comprehensive Metabolic Panel    Ferritin    Hemoglobin A1C    Iron and TIBC    Lipid Panel    Magnesium    PTH, Intact    TSH    Vitamin A    Vitamin B1    Vitamin B12 & Folate    Vitamin D 25 Hydroxy    Zinc    famotidine (PEPCID) 20 MG tablet   2.  Hyperlipidemia, unspecified hyperlipidemia type  CBC with Auto Differential    Comprehensive Metabolic Panel    Ferritin    Hemoglobin A1C    Iron and TIBC    Lipid Panel    Magnesium    PTH, Intact    TSH    Vitamin A    Vitamin B1    Vitamin B12 & Folate    Vitamin D 25 Hydroxy    Zinc    famotidine (PEPCID) 20 MG tablet   3. Pre-diabetes  CBC with Auto Differential    Comprehensive Metabolic Panel    Ferritin    Hemoglobin A1C    Iron and TIBC    Lipid Panel    Magnesium    PTH, Intact    TSH    Vitamin A    Vitamin B1    Vitamin B12 & Folate    Vitamin D 25 Hydroxy    Zinc    famotidine (PEPCID) 20 MG tablet   4. S/P laparoscopic sleeve gastrectomy  CBC with Auto Differential    Comprehensive Metabolic Panel    Ferritin    Hemoglobin A1C    Iron and TIBC    Lipid Panel    Magnesium    PTH, Intact    TSH    Vitamin A    Vitamin B1    Vitamin B12 & Folate    Vitamin D 25 Hydroxy    Zinc    famotidine (PEPCID) 20 MG tablet   5. Morbid obesity with BMI of 50.0-59.9, adult [facility])         Plan:    Dietitian visit today. Patient to continue to increase protein to obtain 60-80g/day, at least 48-64oz of fluid daily, and gradually increase exercise regimen. Labs reviewed and discussed with patient. Vitamin D level low and already prescribed. Pepcid prescribed. Follow-up  Return in about 3 months (around 9/9/2022). Orders this encounter:  Orders Placed This Encounter   Procedures    CBC with Auto Differential     Standing Status:   Future     Standing Expiration Date:   6/9/2023    Comprehensive Metabolic Panel     Standing Status:   Future     Standing Expiration Date:   6/9/2023    Ferritin     Standing Status:   Future     Standing Expiration Date:   6/9/2023    Hemoglobin A1C     Standing Status:   Future     Standing Expiration Date:   6/9/2023    Iron and TIBC     Standing Status:   Future     Standing Expiration Date:   6/9/2023     Order Specific Question:   Is Patient Fasting? Answer:   no     Order Specific Question:   No of Hours?      Answer:   0    Lipid Panel     Standing Status: Future     Standing Expiration Date:   6/9/2023     Order Specific Question:   Is Patient Fasting?/# of Hours     Answer:   12    Magnesium     Standing Status:   Future     Standing Expiration Date:   6/9/2023    PTH, Intact     Standing Status:   Future     Standing Expiration Date:   6/9/2023    TSH     Standing Status:   Future     Standing Expiration Date:   6/9/2023    Vitamin A     Standing Status:   Future     Standing Expiration Date:   6/9/2023    Vitamin B1     Standing Status:   Future     Standing Expiration Date:   6/9/2023    Vitamin B12 & Folate     Standing Status:   Future     Standing Expiration Date:   6/9/2023    Vitamin D 25 Hydroxy     Standing Status:   Future     Standing Expiration Date:   6/9/2023    Zinc     Standing Status:   Future     Standing Expiration Date:   6/9/2023       Prescriptions this encounter:  Orders Placed This Encounter   Medications    famotidine (PEPCID) 20 MG tablet     Sig: Take 1 tablet by mouth daily     Dispense:  30 tablet     Refill:  3       Electronically signed by:  Chris Feldman CNP

## 2022-06-10 LAB
RETINYL PALMITATE: <0.02 MG/L (ref 0–0.1)
VITAMIN A LEVEL: 0.28 MG/L (ref 0.3–1.2)
VITAMIN A, INTERP: ABNORMAL

## 2022-06-11 LAB — VITAMIN B1 WHOLE BLOOD: 71 NMOL/L (ref 70–180)

## 2022-07-11 ENCOUNTER — TELEPHONE (OUTPATIENT)
Dept: BARIATRICS/WEIGHT MGMT | Age: 37
End: 2022-07-11

## 2022-07-11 NOTE — TELEPHONE ENCOUNTER
Left message for patient to contact office to reschedule 9/6/22 appointment as the provider will be out of the office that day.

## 2022-07-31 DIAGNOSIS — I10 ESSENTIAL HYPERTENSION: ICD-10-CM

## 2022-08-01 RX ORDER — METOPROLOL SUCCINATE 100 MG/1
TABLET, EXTENDED RELEASE ORAL
Qty: 30 TABLET | Refills: 5 | Status: ON HOLD
Start: 2022-08-01 | End: 2022-11-03 | Stop reason: HOSPADM

## 2022-08-21 NOTE — PROGRESS NOTES
[facility] Giovanna Whitman  [address] [place] [address] [address] [place] [id number]  Dept: [phone]  Dept Fax: [phone]    22    Chief Complaint   Patient presents with    Other       Vassie Patella 40 y.o. has a complaint of postcoital bleeding. She says she bleeds every time she has sex. She says that it won't stop and then will last until her period. She says she feels that she is always bleeding. She says that her period is so heavy that it is going through her clothes even with using pads. She says it has been months since this started, likely May. She had bariatric surgery and has lost 80 lbs. Review of Systems   Constitutional:  Negative for chills and fever. HENT:  Negative for congestion. Respiratory:  Negative for cough and shortness of breath. Cardiovascular:  Negative for chest pain and palpitations. Gastrointestinal:  Negative for abdominal pain. Genitourinary:  Positive for menstrual problem. Negative for dyspareunia, pelvic pain and vaginal discharge. Musculoskeletal:  Negative for back pain. Neurological:  Negative for dizziness and light-headedness. Psychiatric/Behavioral:  The patient is not nervous/anxious. Gynecologic History  Patient's last menstrual period was 2022.   Contraception: IUD  Last Pap: 3/30/22  Results: ASCUS, HPV neg  Last Mammogram: n/a    Obstetric History  : 1  Para: 1  AB: 0    Past Medical History:   Diagnosis Date    Class 3 severe obesity due to excess calories without serious comorbidity with body mass index (BMI) of 60.0 to 69.9 in adult [facility]) 2019    Essential hypertension 2019    managed by Dr. David Killian     Hyperlipidemia 2016    Hyperlipidemia     Pneumonia due to COVID-19 virus 2021    SOB, fatigue, cough, diarrhea, dizziness x 1 week; no hospitalization    Prediabetes     Under care of team 2022    PCP: David Killian, Lul/Macho, last visit-patient goes 2022 for clearance     Past Surgical History:   Procedure Laterality Date     SECTION      INTRAUTERINE DEVICE INSERTION  2021    [address] [id number] Lot [id number] Exp 2023    SLEEVE GASTRECTOMY  2022    ROBOTIC LAPAROSCOPIC GASTRIC SLEEVE, EGD    SLEEVE GASTRECTOMY N/A 3/7/2022    XI ROBOTIC LAPAROSCOPIC GASTRIC SLEEVE, EGD performed by Blanka Osullivan DO at [address] N/A 2021    EGD BIOPSY performed [facility] Blanka Osullivan DO [address] EXTRACTION       Allergies   Allergen Reactions    Vancomycin Itching     Patient felt like skin was on fire.      Current Outpatient Medications   Medication Sig Dispense Refill    metoprolol succinate (TOPROL XL) 100 MG extended release tablet TAKE ONE TABLET BY MOUTH DAILY 30 tablet 5    famotidine (PEPCID) 20 MG tablet Take 1 tablet by mouth daily 30 tablet 3    Multiple Vitamins-Minerals (THERAPEUTIC MULTIVITAMIN-MINERALS) tablet Take 1 tablet by mouth in the morning and at bedtime MVI from [facility]      calcium carbonate (OSCAL) 500 MG TABS tablet Take 500 mg by mouth daily Bariatric fusion      promethazine (PHENERGAN) 25 MG tablet Take 1 tablet by mouth every 6 hours as needed for Nausea 30 tablet 0    ondansetron (ZOFRAN) 4 MG tablet Take 1 tablet by mouth every 8 hours as needed for Nausea or Vomiting 30 tablet 0    vitamin D (ERGOCALCIFEROL) 1.25 MG (71392 UT) CAPS capsule Take 1 capsule by mouth once a week for 8 doses 8 capsule 0     Current Facility-Administered Medications   Medication Dose Route Frequency Provider Last Rate Last Admin    levonorgestrel (MIRENA) IUD 52 mg 1 each  1 each IntraUTERine Once Toya Hawkins MD   1 each at 21 1144    levonorgestrel (MIRENA) IUD 52 mg 1 each  1 each IntraUTERine Once Toya Hawkins MD   1 each at 21 1210     Social History     Socioeconomic History Marital status: Single     Spouse name: Not on file    Number of children: 1    Years of education: Not on file    Highest education level: Not on file   Occupational History    Not on file   Tobacco Use    Smoking status: Never    Smokeless tobacco: Never   Vaping Use    Vaping Use: Never used   Substance and Sexual Activity    Alcohol use: No    Drug use: No    Sexual activity: Not Currently   Other Topics Concern    Not on file   Social History Narrative    ** Merged History Encounter **          Social Determinants of Health     Financial Resource Strain: Low Risk     Difficulty of Paying Living Expenses: Not hard at all   Food Insecurity: No Food Insecurity    Worried About Running Out of Food in the Last Year: Never true    Ran Out of Food in the Last Year: Never true   Transportation Needs: Not on file   Physical Activity: Not on file   Stress: Not on file   Social Connections: Not on file   Intimate Partner Violence: Not on file   Housing Stability: Not on file     Family History   Problem Relation Age of Onset    Heart Disease Mother     Other Mother     Heart Disease Father     Other Father     Depression Mother     Diabetes Mother     High Blood Pressure Mother     Diabetes Maternal Grandmother     High Blood Pressure Maternal Grandmother     Kidney Disease Maternal Grandmother     Cancer Maternal Grandfather         prostate    Diabetes Maternal Grandfather     Heart Disease Maternal Grandfather     High Blood Pressure Maternal Grandfather     Cancer Paternal Grandmother         breast and brain       Physical exam Physical Exam  Constitutional:       Appearance: Normal appearance. She is obese. HENT:      Head: Normocephalic. Eyes:      Extraocular Movements: Extraocular movements intact. Pulmonary:      Effort: Pulmonary effort is normal.   Neurological:      Mental Status: She is alert and oriented to person, place, and time.    Psychiatric:         Mood and Affect: Mood normal.         Behavior: Behavior normal.         Thought Content: Thought content normal.         Judgment: Judgment normal.       Assessment/Plan  1. Postcoital bleeding  - Will rule out anatomic issues and IUD placement  - US PELVIS COMPLETE; Future  - US NON OB TRANSVAGINAL; Future    2.  Menorrhagia with regular cycle  - Will confirm IUD placement  - Possibly related to bariatric surgery  - Will discuss further options after imaging performed    Pt to follow up after imaging done    Pili Lott MD  [address] [place] [address]

## 2022-08-22 ENCOUNTER — OFFICE VISIT (OUTPATIENT)
Dept: OBGYN CLINIC | Age: 37
End: 2022-08-22
Payer: MEDICARE

## 2022-08-22 VITALS
BODY MASS INDEX: 51.91 KG/M2 | SYSTOLIC BLOOD PRESSURE: 146 MMHG | WEIGHT: 293 LBS | DIASTOLIC BLOOD PRESSURE: 79 MMHG | HEIGHT: 63 IN | HEART RATE: 61 BPM

## 2022-08-22 DIAGNOSIS — N93.0 POSTCOITAL BLEEDING: Primary | ICD-10-CM

## 2022-08-22 DIAGNOSIS — N92.0 MENORRHAGIA WITH REGULAR CYCLE: ICD-10-CM

## 2022-08-22 PROBLEM — R87.611 ATYPICAL SQUAMOUS CELLS CANNOT EXCLUDE HIGH GRADE SQUAMOUS INTRAEPITHELIAL LESION ON CYTOLOGIC SMEAR OF CERVIX (ASC-H): Status: RESOLVED | Noted: 2020-10-13 | Resolved: 2022-08-22

## 2022-08-22 PROBLEM — E66.01 MORBID OBESITY WITH BMI OF 60.0-69.9, ADULT (HCC): Status: RESOLVED | Noted: 2019-02-06 | Resolved: 2022-08-22

## 2022-08-22 PROCEDURE — G8427 DOCREV CUR MEDS BY ELIG CLIN: HCPCS | Performed by: OBSTETRICS & GYNECOLOGY

## 2022-08-22 PROCEDURE — 99213 OFFICE O/P EST LOW 20 MIN: CPT | Performed by: OBSTETRICS & GYNECOLOGY

## 2022-08-22 PROCEDURE — G8417 CALC BMI ABV UP PARAM F/U: HCPCS | Performed by: OBSTETRICS & GYNECOLOGY

## 2022-08-22 PROCEDURE — 1036F TOBACCO NON-USER: CPT | Performed by: OBSTETRICS & GYNECOLOGY

## 2022-08-22 ASSESSMENT — ENCOUNTER SYMPTOMS
BACK PAIN: 0
SHORTNESS OF BREATH: 0
COUGH: 0
ABDOMINAL PAIN: 0

## 2022-08-26 ENCOUNTER — HOSPITAL ENCOUNTER (OUTPATIENT)
Dept: ULTRASOUND IMAGING | Age: 37
Discharge: HOME | End: 2022-08-28
Payer: MEDICARE

## 2022-08-26 DIAGNOSIS — N93.0 POSTCOITAL BLEEDING: ICD-10-CM

## 2022-08-26 PROCEDURE — 76856 US EXAM PELVIC COMPLETE: CPT

## 2022-08-26 PROCEDURE — 76830 TRANSVAGINAL US NON-OB: CPT

## 2022-09-12 ENCOUNTER — HOSPITAL ENCOUNTER (OUTPATIENT)
Age: 37
Discharge: HOME | End: 2022-09-12
Payer: MEDICARE

## 2022-09-12 DIAGNOSIS — I10 ESSENTIAL HYPERTENSION: ICD-10-CM

## 2022-09-12 DIAGNOSIS — E78.5 HYPERLIPIDEMIA, UNSPECIFIED HYPERLIPIDEMIA TYPE: ICD-10-CM

## 2022-09-12 DIAGNOSIS — R73.03 PRE-DIABETES: ICD-10-CM

## 2022-09-12 DIAGNOSIS — Z98.84 S/P LAPAROSCOPIC SLEEVE GASTRECTOMY: ICD-10-CM

## 2022-09-12 LAB
ABSOLUTE EOS #: 0.1 K/UL (ref 0–0.4)
ABSOLUTE LYMPH #: 2.8 K/UL (ref 1–4.8)
ABSOLUTE MONO #: 0.7 K/UL (ref 0.1–1.3)
ALBUMIN SERPL-MCNC: 3.9 G/DL (ref 3.5–5.2)
ALP BLD-CCNC: 68 U/L (ref 35–104)
ALT SERPL-CCNC: 16 U/L (ref 5–33)
ANION GAP SERPL CALCULATED.3IONS-SCNC: 7 MMOL/L (ref 9–17)
AST SERPL-CCNC: 15 U/L
BASOPHILS # BLD: 0 % (ref 0–2)
BASOPHILS ABSOLUTE: 0 K/UL (ref 0–0.2)
BILIRUB SERPL-MCNC: 0.3 MG/DL (ref 0.3–1.2)
BUN BLDV-MCNC: 13 MG/DL (ref 6–20)
CALCIUM SERPL-MCNC: 8.9 MG/DL (ref 8.6–10.4)
CHLORIDE BLD-SCNC: 104 MMOL/L (ref 98–107)
CHOLESTEROL/HDL RATIO: 3.5
CHOLESTEROL: 183 MG/DL
CO2: 25 MMOL/L (ref 20–31)
CREAT SERPL-MCNC: 0.6 MG/DL (ref 0.5–0.9)
EOSINOPHILS RELATIVE PERCENT: 2 % (ref 0–4)
ESTIMATED AVERAGE GLUCOSE: 114 MG/DL
FOLATE: 16.4 NG/ML
GFR AFRICAN AMERICAN: >60 ML/MIN
GFR NON-AFRICAN AMERICAN: >60 ML/MIN
GFR SERPL CREATININE-BSD FRML MDRD: ABNORMAL ML/MIN/{1.73_M2}
GLUCOSE BLD-MCNC: 89 MG/DL (ref 70–99)
HBA1C MFR BLD: 5.6 % (ref 4–6)
HCT VFR BLD CALC: 28.5 % (ref 36–46)
HDLC SERPL-MCNC: 52 MG/DL
HEMOGLOBIN: 9.7 G/DL (ref 12–16)
LDL CHOLESTEROL: 117 MG/DL (ref 0–130)
LYMPHOCYTES # BLD: 30 % (ref 24–44)
MAGNESIUM: 2 MG/DL (ref 1.6–2.6)
MCH RBC QN AUTO: 26.9 PG (ref 26–34)
MCHC RBC AUTO-ENTMCNC: 33.9 G/DL (ref 31–37)
MCV RBC AUTO: 79.1 FL (ref 80–100)
MONOCYTES # BLD: 7 % (ref 1–7)
PDW BLD-RTO: 14.3 % (ref 11.5–14.9)
PLATELET # BLD: 405 K/UL (ref 150–450)
PMV BLD AUTO: 7.8 FL (ref 6–12)
POTASSIUM SERPL-SCNC: 3.9 MMOL/L (ref 3.7–5.3)
PTH INTACT: 41.8 PG/ML (ref 14–72)
RBC # BLD: 3.6 M/UL (ref 4–5.2)
SEG NEUTROPHILS: 61 % (ref 36–66)
SEGMENTED NEUTROPHILS ABSOLUTE COUNT: 5.6 K/UL (ref 1.3–9.1)
SODIUM BLD-SCNC: 136 MMOL/L (ref 135–144)
TOTAL PROTEIN: 6.8 G/DL (ref 6.4–8.3)
TRIGL SERPL-MCNC: 71 MG/DL
TSH SERPL DL<=0.05 MIU/L-ACNC: 2.12 UIU/ML (ref 0.3–5)
VITAMIN B-12: 868 PG/ML (ref 232–1245)
WBC # BLD: 9.3 K/UL (ref 3.5–11)

## 2022-09-12 PROCEDURE — 84425 ASSAY OF VITAMIN B-1: CPT

## 2022-09-12 PROCEDURE — 80053 COMPREHEN METABOLIC PANEL: CPT

## 2022-09-12 PROCEDURE — 85025 COMPLETE CBC W/AUTO DIFF WBC: CPT

## 2022-09-12 PROCEDURE — 84443 ASSAY THYROID STIM HORMONE: CPT

## 2022-09-12 PROCEDURE — 83540 ASSAY OF IRON: CPT

## 2022-09-12 PROCEDURE — 83036 HEMOGLOBIN GLYCOSYLATED A1C: CPT

## 2022-09-12 PROCEDURE — 83970 ASSAY OF PARATHORMONE: CPT

## 2022-09-12 PROCEDURE — 82607 VITAMIN B-12: CPT

## 2022-09-12 PROCEDURE — 82728 ASSAY OF FERRITIN: CPT

## 2022-09-12 PROCEDURE — 82306 VITAMIN D 25 HYDROXY: CPT

## 2022-09-12 PROCEDURE — 83550 IRON BINDING TEST: CPT

## 2022-09-12 PROCEDURE — 82746 ASSAY OF FOLIC ACID SERUM: CPT

## 2022-09-12 PROCEDURE — 84630 ASSAY OF ZINC: CPT

## 2022-09-12 PROCEDURE — 84590 ASSAY OF VITAMIN A: CPT

## 2022-09-12 PROCEDURE — 83735 ASSAY OF MAGNESIUM: CPT

## 2022-09-12 PROCEDURE — 36415 COLL VENOUS BLD VENIPUNCTURE: CPT

## 2022-09-12 PROCEDURE — 80061 LIPID PANEL: CPT

## 2022-09-13 DIAGNOSIS — D50.9 IRON DEFICIENCY ANEMIA, UNSPECIFIED IRON DEFICIENCY ANEMIA TYPE: Primary | ICD-10-CM

## 2022-09-13 LAB
FERRITIN: 11 NG/ML (ref 13–150)
IRON SATURATION: 6 % (ref 20–55)
IRON: 25 UG/DL (ref 37–145)
TOTAL IRON BINDING CAPACITY: 390 UG/DL (ref 250–450)
UNSATURATED IRON BINDING CAPACITY: 365 UG/DL (ref 112–347)
VITAMIN D 25-HYDROXY: 38 NG/ML

## 2022-09-13 RX ORDER — FERROUS SULFATE 325(65) MG
325 TABLET ORAL
Qty: 90 TABLET | Refills: 1 | Status: SHIPPED | OUTPATIENT
Start: 2022-09-13

## 2022-09-14 LAB — ZINC: 67.2 UG/DL (ref 60–120)

## 2022-09-15 LAB
RETINYL PALMITATE: 0.02 MG/L (ref 0–0.1)
VITAMIN A LEVEL: 0.41 MG/L (ref 0.3–1.2)
VITAMIN A, INTERP: NORMAL

## 2022-09-16 LAB — VITAMIN B1 WHOLE BLOOD: 60 NMOL/L (ref 70–180)

## 2022-09-22 ENCOUNTER — OFFICE VISIT (OUTPATIENT)
Dept: BARIATRICS/WEIGHT MGMT | Age: 37
End: 2022-09-22
Payer: MEDICARE

## 2022-09-22 VITALS
HEART RATE: 57 BPM | SYSTOLIC BLOOD PRESSURE: 126 MMHG | HEIGHT: 63 IN | DIASTOLIC BLOOD PRESSURE: 78 MMHG | BODY MASS INDEX: 51.91 KG/M2 | WEIGHT: 293 LBS

## 2022-09-22 DIAGNOSIS — I10 ESSENTIAL HYPERTENSION: Primary | ICD-10-CM

## 2022-09-22 DIAGNOSIS — R73.03 PRE-DIABETES: ICD-10-CM

## 2022-09-22 DIAGNOSIS — E78.5 HYPERLIPIDEMIA, UNSPECIFIED HYPERLIPIDEMIA TYPE: ICD-10-CM

## 2022-09-22 DIAGNOSIS — R13.19 ESOPHAGEAL DYSPHAGIA: ICD-10-CM

## 2022-09-22 DIAGNOSIS — D50.9 IRON DEFICIENCY ANEMIA, UNSPECIFIED IRON DEFICIENCY ANEMIA TYPE: ICD-10-CM

## 2022-09-22 DIAGNOSIS — Z98.84 S/P LAPAROSCOPIC SLEEVE GASTRECTOMY: ICD-10-CM

## 2022-09-22 DIAGNOSIS — E66.01 MORBID OBESITY WITH BMI OF 50.0-59.9, ADULT (HCC): ICD-10-CM

## 2022-09-22 PROCEDURE — G8427 DOCREV CUR MEDS BY ELIG CLIN: HCPCS | Performed by: NURSE PRACTITIONER

## 2022-09-22 PROCEDURE — 1036F TOBACCO NON-USER: CPT | Performed by: NURSE PRACTITIONER

## 2022-09-22 PROCEDURE — 99213 OFFICE O/P EST LOW 20 MIN: CPT | Performed by: NURSE PRACTITIONER

## 2022-09-22 PROCEDURE — G8417 CALC BMI ABV UP PARAM F/U: HCPCS | Performed by: NURSE PRACTITIONER

## 2022-09-22 RX ORDER — ONDANSETRON 4 MG/1
4 TABLET, FILM COATED ORAL EVERY 8 HOURS PRN
Qty: 30 TABLET | Refills: 0 | Status: SHIPPED | OUTPATIENT
Start: 2022-09-22

## 2022-09-22 RX ORDER — PANTOPRAZOLE SODIUM 40 MG/1
40 TABLET, DELAYED RELEASE ORAL DAILY
Qty: 30 TABLET | Refills: 3 | Status: SHIPPED | OUTPATIENT
Start: 2022-09-22

## 2022-09-22 RX ORDER — SUCRALFATE 1 G/1
1 TABLET ORAL 4 TIMES DAILY
Qty: 120 TABLET | Refills: 3 | Status: SHIPPED | OUTPATIENT
Start: 2022-09-22

## 2022-09-22 NOTE — PROGRESS NOTES
Medical Nutrition Therapy  Metabolic and Bariatric surgery  6 month follow up note         Pt reports: She has experienced some pain and vomiting with meals. She reports vomiting almost every day. She met with NP today where this was addressed as well. Meeting protein needs and fluid needs otherwise. Vitals:   Vitals:    09/22/22 1445   BP: 126/78   Site: Right Upper Arm   Position: Sitting   Cuff Size: Large Adult   Pulse: 57   Weight: 296 lb (134.3 kg)   Height: 5' 3\" (1.6 m)      Body mass index is 52.43 kg/m². Labs reviewed:     Multivitamin/mineral intake: MVI and iron  Calcium intake: Ca daily              Nutrition Assessment:   PES: Inadequate food and beverage intake r/t WLS as evidenced by loss of excess body weight lost 72 lbs over 6 mo. Goals   60-80gm of protein  48-64oz of fluid  Vitamin adherance  Basic adherance to WLS behavious and this document has been scanned into the chart.        [x] met     []  Not met        Plan:   F/u 9 months after surgery         Krupa Brunson, MS, RD, LD

## 2022-09-22 NOTE — PROGRESS NOTES
Post-op Bariatric Surgery Note    Subjective     Patient is 6 months s/p laparoscopic sleeve gastrectomy, down 72 lbs. Overall, doing well. Incisions well healed. Consistent use of MVI and calcium. Sometimes feels as if food is getting stuck and then vomits. No problems with liquids. Physical activity includes walking. No pain or current issues. Allergies: Allergies   Allergen Reactions    Vancomycin Itching     Patient felt like skin was on fire. Past Medical History:     Past Medical History:   Diagnosis Date    Class 3 severe obesity due to excess calories without serious comorbidity with body mass index (BMI) of 60.0 to 69.9 in adult [facility]) 2019    Essential hypertension 2019    managed by Dr. Ramiro Schrader     Hyperlipidemia 2016    Hyperlipidemia     Pneumonia due to COVID-19 virus 2021    SOB, fatigue, cough, diarrhea, dizziness x 1 week; no hospitalization    Prediabetes     Under care of team 2022    PCP: [facility], Mccarty/Macho, last visit-patient goes 2022 for clearance   .     Past Surgical History:  Past Surgical History:   Procedure Laterality Date     SECTION      INTRAUTERINE DEVICE INSERTION  2021    [address]ori [address] [id number] Lot [id number] Exp 2023    SLEEVE GASTRECTOMY  2022    ROBOTIC LAPAROSCOPIC GASTRIC SLEEVE, EGD    SLEEVE GASTRECTOMY N/A 3/7/2022    XI ROBOTIC LAPAROSCOPIC GASTRIC SLEEVE, EGD performed by Nicolette Knight DO at [address] N/A 2021    EGD BIOPSY performed by Nicolette Knight DO at [address] EXTRACTION         Family History:  Family History   Problem Relation Age of Onset    Heart Disease Mother     Other Mother     Heart Disease Father     Other Father     Depression Mother     Diabetes Mother     High Blood Pressure Mother     Diabetes Maternal Grandmother     High Blood Pressure Maternal Grandmother     Kidney Disease Maternal Grandmother     Cancer Maternal Grandfather         prostate    Diabetes Maternal Grandfather     Heart Disease Maternal Grandfather     High Blood Pressure Maternal Grandfather     Cancer Paternal Grandmother         breast and brain       Social History:  Social History     Socioeconomic History    Marital status: Single     Spouse name: Not on file    Number of children: 1    Years of education: Not on file    Highest education level: Not on file   Occupational History    Not on file   Tobacco Use    Smoking status: Never    Smokeless tobacco: Never   Vaping Use    Vaping Use: Never used   Substance and Sexual Activity    Alcohol use: No    Drug use: No    Sexual activity: Not Currently   Other Topics Concern    Not on file   Social History Narrative    ** Merged History Encounter **          Social Determinants of Health     Financial Resource Strain: Low Risk     Difficulty of Paying Living Expenses: Not hard at all   Food Insecurity: No Food Insecurity    Worried About Running Out of Food in the Last Year: Never true    Ran Out of Food in the Last Year: Never true   Transportation Needs: Not on file   Physical Activity: Not on file   Stress: Not on file   Social Connections: Not on file   Intimate Partner Violence: Not on file   Housing Stability: Not on file       Current Medications:  Current Outpatient Medications   Medication Sig Dispense Refill    ondansetron (ZOFRAN) 4 MG tablet Take 1 tablet by mouth every 8 hours as needed for Nausea or Vomiting 30 tablet 0    pantoprazole (PROTONIX) 40 MG tablet Take 1 tablet by mouth daily 30 tablet 3    sucralfate (CARAFATE) 1 GM tablet Take 1 tablet by mouth 4 times daily 120 tablet 3    ferrous sulfate (IRON 325) 325 (65 Fe) MG tablet Take 1 tablet by mouth daily (with breakfast) 90 tablet 1    metoprolol succinate (TOPROL XL) 100 MG extended release tablet TAKE ONE TABLET BY MOUTH DAILY 30 tablet 5    Multiple Vitamins-Minerals (THERAPEUTIC MULTIVITAMIN-MINERALS) tablet Take 1 tablet by mouth in the morning and at bedtime MVI from [facility]      calcium carbonate (OSCAL) 500 MG TABS tablet Take 500 mg by mouth daily Bariatric fusion      promethazine (PHENERGAN) 25 MG tablet Take 1 tablet by mouth every 6 hours as needed for Nausea 30 tablet 0    vitamin D (ERGOCALCIFEROL) 1.25 MG (91212 UT) CAPS capsule Take 1 capsule by mouth once a week for 8 doses 8 capsule 0     Current Facility-Administered Medications   Medication Dose Route Frequency Provider Last Rate Last Admin    levonorgestrel (MIRENA) IUD 52 mg 1 each  1 each IntraUTERine Once Otto Torres MD   1 each at 07/02/21 1144    levonorgestrel (MIRENA) IUD 52 mg 1 each  1 each IntraUTERine Once Otto Torres MD   1 each at 01/05/21 1210       Vital Signs:  /78 (Site: Right Upper Arm, Position: Sitting, Cuff Size: Large Adult)   Pulse 57   Ht 5' 3\" (1.6 m)   Wt 296 lb (134.3 kg)   BMI 52.43 kg/m²     BMI/Height/Weight:  Body mass index is 52.43 kg/m². Review of Systems - A review of systems was performed. All was negative unless otherwise documented in HPI. Constitutional: Negative for fever, chills and diaphoresis. HENT: Negative for hearing loss and trouble swallowing. Eyes: Negative for photophobia and visual disturbance. Respiratory: Negative for cough, shortness of breath and wheezing. Cardiovascular: Negative for chest pain and palpitations. Gastrointestinal: Negative for nausea, abdominal pain, diarrhea, constipation, blood in stool and abdominal distention. Positive for dysphagia and vomiting. Endocrine: Negative for polydipsia, polyphagia and polyuria. Genitourinary: Negative for dysuria, frequency, hematuria and difficulty urinating. Musculoskeletal: Negative for myalgias, joint swelling. Skin: Negative for pallor and rash. Neurological: Negative for dizziness, tremors, light-headedness and headaches.    Psychiatric/Behavioral: Negative for sleep Take zofran 10 minutes before eating. If no better in 1 week, call. May need EGD with dilation. Follow-up  Return in about 3 months (around 12/22/2022). Orders this encounter:  No orders of the defined types were placed in this encounter.       Prescriptions this encounter:  Orders Placed This Encounter   Medications    ondansetron (ZOFRAN) 4 MG tablet     Sig: Take 1 tablet by mouth every 8 hours as needed for Nausea or Vomiting     Dispense:  30 tablet     Refill:  0    pantoprazole (PROTONIX) 40 MG tablet     Sig: Take 1 tablet by mouth daily     Dispense:  30 tablet     Refill:  3    sucralfate (CARAFATE) 1 GM tablet     Sig: Take 1 tablet by mouth 4 times daily     Dispense:  120 tablet     Refill:  3       Electronically signed by:  Jonh Roca CNP

## 2022-10-07 ENCOUNTER — PROCEDURE VISIT (OUTPATIENT)
Dept: OBGYN CLINIC | Age: 37
End: 2022-10-07
Payer: MEDICARE

## 2022-10-07 VITALS
HEART RATE: 60 BPM | HEIGHT: 63 IN | SYSTOLIC BLOOD PRESSURE: 128 MMHG | WEIGHT: 287 LBS | BODY MASS INDEX: 50.85 KG/M2 | DIASTOLIC BLOOD PRESSURE: 81 MMHG

## 2022-10-07 DIAGNOSIS — Z30.432 ENCOUNTER FOR IUD REMOVAL: Primary | ICD-10-CM

## 2022-10-07 DIAGNOSIS — D25.9 UTERINE LEIOMYOMA, UNSPECIFIED LOCATION: ICD-10-CM

## 2022-10-07 PROCEDURE — 58301 REMOVE INTRAUTERINE DEVICE: CPT | Performed by: OBSTETRICS & GYNECOLOGY

## 2022-10-07 NOTE — PROGRESS NOTES
[address] [facility] - [address] [facility]  Dept: [phone]    IUD Removal Note        Elpidio Corners  10/7/2022  No LMP recorded. Patient has had an implant. HPI: The patient is requesting that her Mirena IUD be removed.         Past Medical History:   Diagnosis Date    Class 3 severe obesity due to excess calories without serious comorbidity with body mass index (BMI) of 60.0 to 69.9 in adult [facility]) 2019    Essential hypertension 2019    managed by Dr. Paco Fairbanks     Hyperlipidemia 2016    Hyperlipidemia     Pneumonia due to COVID-19 virus 2021    SOB, fatigue, cough, diarrhea, dizziness x 1 week; no hospitalization    Prediabetes     Under care of team 2022    PCP: Lul Ardon/Macho, last visit-patient goes 2022 for clearance       Past Surgical History:   Procedure Laterality Date     SECTION      INTRAUTERINE DEVICE INSERTION  2021    Mirjhonathan Smith. [address] [id number] Lot [id number] Exp 2023    SLEEVE GASTRECTOMY  2022    ROBOTIC LAPAROSCOPIC GASTRIC SLEEVE, EGD    SLEEVE GASTRECTOMY N/A 3/7/2022    XI ROBOTIC LAPAROSCOPIC GASTRIC SLEEVE, EGD performed by Albino Rios DO at [address] [facility] N/A 2021    EGD BIOPSY performed by Albino Rios DO at [address] History     Tobacco Use    Smoking status: Never    Smokeless tobacco: Never   Vaping Use    Vaping Use: Never used   Substance Use Topics    Alcohol use: No    Drug use: No           MEDICATIONS:  Current Outpatient Medications   Medication Sig Dispense Refill    ondansetron (ZOFRAN) 4 MG tablet Take 1 tablet by mouth every 8 hours as needed for Nausea or Vomiting 30 tablet 0    pantoprazole (PROTONIX) 40 MG tablet Take 1 tablet by mouth daily 30 tablet 3    sucralfate (CARAFATE) 1 GM tablet Take 1 tablet by mouth 4 times daily 120 tablet 3    ferrous sulfate (IRON 325) 325 (65 Fe) MG tablet Take 1 tablet by mouth daily (with breakfast) 90 tablet 1    metoprolol succinate (TOPROL XL) 100 MG extended release tablet TAKE ONE TABLET BY MOUTH DAILY 30 tablet 5    Multiple Vitamins-Minerals (THERAPEUTIC MULTIVITAMIN-MINERALS) tablet Take 1 tablet by mouth in the morning and at bedtime MVI from [facility]      calcium carbonate (OSCAL) 500 MG TABS tablet Take 500 mg by mouth daily Bariatric fusion      promethazine (PHENERGAN) 25 MG tablet Take 1 tablet by mouth every 6 hours as needed for Nausea 30 tablet 0    vitamin D (ERGOCALCIFEROL) 1.25 MG (31245 UT) CAPS capsule Take 1 capsule by mouth once a week for 8 doses 8 capsule 0     Current Facility-Administered Medications   Medication Dose Route Frequency Provider Last Rate Last Admin    levonorgestrel (MIRENA) IUD 52 mg 1 each  1 each IntraUTERine Once Laxmi Dias MD   1 each at 07/02/21 1144    levonorgestrel (MIRENA) IUD 52 mg 1 each  1 each IntraUTERine Once Laxmi Dias MD   1 each at 01/05/21 1210         ALLERGIES:  Allergies as of 10/07/2022 - Fully Reviewed 10/07/2022   Allergen Reaction Noted    Vancomycin Itching          Vitals:    10/07/22 1248   BP: 128/81   Site: Left Upper Arm   Position: Sitting   Cuff Size: Large Adult   Pulse: 60   Weight: 287 lb (130.2 kg)   Height: 5' 3\" (1.6 m)           Chaperone for Intimate Exam  Chaperone was offered as part of the rooming process. Patient declined and agrees to continue with exam without a chaperone. A time out was called by the Chaperone listed above while ALL participants were quiet and attentive. The procedure to be completed was confirmed, with the appropriate laterality demarcated prior, if appropriate, as well as the patients name and a unique identifier, her date of birth. All questions were answered to her satisfaction.   The consent was signed prior to the time out and all the risks were discussed in detail. The patient was positioned comfortably on the exam table. After a bi-manual exam; the uterus was found to be  anteverted. There was no cervical motion tenderness or adnexal masses. The bladder was smooth, non-tender and without palpable masses. A sterile speculum was placed without incident. The IUD strings were visualized and grasped with ring forceps. General traction was applied and the IUD was removed without difficulty. Post procedure restrictions were reviewed and given to the patient. She was instructed to use barrier protection for sexually transmitted disease prevention as well as string checks/timing. The patient tolerated the procedure without difficulty. She is to notify the office or go to the nearest Emergency Department if she experiences Abdominal Pain, Temperatures more than 101 F, Odiferous Vaginal Discharge, Dizziness or Shortness of breath. Counseling Hormonal Based Birth Control:      The patient was seen and counseled on all forms of birth control both male and female  reversible and non. She is aware that hormonal based birth control may increase her risk of developing a blood clot which may increase her morbidity and or mortality. She was counseled on alternate non hormonal based contraception options. We discussed that smoking and any hormonal based contraception may increase the patients risks of developing these life threatening blood clots. All patients are encouraged to stop smoking at the time of contraceptive counseling. Cessation programs were reviewed. The patient was instructed to use barrier contraception for sexually transmitted disease prevention. The patient was also informed of antibiotics decreasing contraceptive efficacy and the need for barrier contraception from the onset of her antibiotic dosing and through a minimum of thirty days from antibiotic cessation.     The life threatening side effect profile was reviewed in detail this includes but is not limited to shortness of breath, chest pain, severe or persistent headaches, or calf pain. If any of these occur the patient has been instructed to stop using her hormonal based contraception, notify the office, and go to the emergency department or call 911. The patient denied any personal history of blood clots in her leg, lung, or heart and denied any family history of stroke, TIA, sudden cardiac death < 36 y.o.,pulmonary embolism, or deep venous thrombosis. ASSESSMENT:  IUD Removal   Diagnosis Orders   1. Encounter for IUD removal        2. Uterine leiomyoma, unspecified location  54042 - [place] REMOVE INTRAUTERINE DEVICE        Patient Active Problem List    Diagnosis Date Noted    CLARISSA (iron deficiency anemia) 09/22/2022     Priority: Medium    Morbid obesity with BMI of 50.0-59.9, adult ([facility] [address] 06/09/2022     Priority: Medium    Atypical glandular cells on cervical Pap smear 04/06/2022 2022 - needs colp and EMB      S/P laparoscopic sleeve gastrectomy 03/07/2022    Esophageal dysphagia     Antral gastritis     IUD migration 07/02/2021    Vitamin D deficiency 06/02/2021    Essential hypertension 02/06/2019    Hyperlipidemia 05/08/2016    Pre-diabetes 05/08/2016     Family Planning    reports that she has never smoked. She has never used smokeless tobacco.      PLAN:  Family Planning Counseling Completed  Barrier Recommendations  Will see how pt does without hormones and if continues to have heavy bleeding may recommend a hyst due to her fibroids.      Roshni Kincaid MD

## 2022-10-30 ENCOUNTER — APPOINTMENT (OUTPATIENT)
Dept: CT IMAGING | Age: 37
End: 2022-10-30
Payer: MEDICARE

## 2022-10-30 ENCOUNTER — HOSPITAL ENCOUNTER (EMERGENCY)
Age: 37
Discharge: ANOTHER HOSPITAL | End: 2022-10-31
Attending: EMERGENCY MEDICINE
Payer: MEDICARE

## 2022-10-30 DIAGNOSIS — I67.5 MOYAMOYA: Primary | ICD-10-CM

## 2022-10-30 LAB
ABSOLUTE EOS #: 0.2 K/UL (ref 0–0.4)
ABSOLUTE LYMPH #: 3.3 K/UL (ref 1–4.8)
ABSOLUTE MONO #: 1 K/UL (ref 0.1–1.3)
ALBUMIN SERPL-MCNC: 3.8 G/DL (ref 3.5–5.2)
ALP BLD-CCNC: 63 U/L (ref 35–104)
ALT SERPL-CCNC: 13 U/L (ref 5–33)
ANION GAP SERPL CALCULATED.3IONS-SCNC: 10 MMOL/L (ref 9–17)
ANION GAP SERPL CALCULATED.3IONS-SCNC: ABNORMAL MMOL/L
AST SERPL-CCNC: 15 U/L
BASOPHILS # BLD: 1 % (ref 0–2)
BASOPHILS ABSOLUTE: 0.1 K/UL (ref 0–0.2)
BILIRUB SERPL-MCNC: <0.2 MG/DL (ref 0.3–1.2)
BUN BLDV-MCNC: 14 MG/DL (ref 6–20)
BUN BLDV-MCNC: ABNORMAL MG/DL
BUN/CREAT BLD: ABNORMAL (ref 9–20)
CALCIUM SERPL-MCNC: 8.6 MG/DL (ref 8.6–10.4)
CALCIUM SERPL-MCNC: ABNORMAL MG/DL
CHLORIDE BLD-SCNC: 101 MMOL/L (ref 98–107)
CHLORIDE BLD-SCNC: ABNORMAL MMOL/L
CO2: 24 MMOL/L (ref 20–31)
CO2: ABNORMAL MMOL/L
CREAT SERPL-MCNC: 0.77 MG/DL (ref 0.5–0.9)
CREAT SERPL-MCNC: ABNORMAL MG/DL
EOSINOPHILS RELATIVE PERCENT: 2 % (ref 0–4)
GFR SERPL CREATININE-BSD FRML MDRD: >60 ML/MIN/1.73M2
GFR SERPL CREATININE-BSD FRML MDRD: ABNORMAL ML/MIN/1.73M2
GLUCOSE BLD-MCNC: 89 MG/DL (ref 65–105)
GLUCOSE BLD-MCNC: 94 MG/DL (ref 70–99)
GLUCOSE BLD-MCNC: ABNORMAL MG/DL
HCT VFR BLD CALC: 22 % (ref 36–46)
HEMOGLOBIN: 7.2 G/DL (ref 12–16)
INR BLD: 1.1
LYMPHOCYTES # BLD: 32 % (ref 24–44)
MCH RBC QN AUTO: 24 PG (ref 26–34)
MCHC RBC AUTO-ENTMCNC: 32.6 G/DL (ref 31–37)
MCV RBC AUTO: 73.8 FL (ref 80–100)
MONOCYTES # BLD: 9 % (ref 1–7)
MYOGLOBIN: <21 NG/ML (ref 25–58)
MYOGLOBIN: ABNORMAL NG/ML
PARTIAL THROMBOPLASTIN TIME: 28.2 SEC (ref 24–36)
PDW BLD-RTO: 14.8 % (ref 11.5–14.9)
PLATELET # BLD: 366 K/UL (ref 150–450)
PMV BLD AUTO: 7.7 FL (ref 6–12)
POTASSIUM SERPL-SCNC: 4.1 MMOL/L (ref 3.7–5.3)
POTASSIUM SERPL-SCNC: ABNORMAL MMOL/L
PROTHROMBIN TIME: 14.4 SEC (ref 11.8–14.6)
RBC # BLD: 2.98 M/UL (ref 4–5.2)
SEG NEUTROPHILS: 56 % (ref 36–66)
SEGMENTED NEUTROPHILS ABSOLUTE COUNT: 5.8 K/UL (ref 1.3–9.1)
SODIUM BLD-SCNC: 135 MMOL/L (ref 135–144)
SODIUM BLD-SCNC: ABNORMAL MMOL/L
TOTAL CK: 87 U/L (ref 26–192)
TOTAL CK: ABNORMAL U/L
TOTAL PROTEIN: 6.7 G/DL (ref 6.4–8.3)
TROPONIN INTERP: ABNORMAL
TROPONIN T: ABNORMAL NG/ML
TROPONIN, HIGH SENSITIVITY: <6 NG/L (ref 0–14)
TROPONIN, HIGH SENSITIVITY: ABNORMAL NG/L (ref 0–14)
WBC # BLD: 10.3 K/UL (ref 3.5–11)

## 2022-10-30 PROCEDURE — 80053 COMPREHEN METABOLIC PANEL: CPT

## 2022-10-30 PROCEDURE — 85730 THROMBOPLASTIN TIME PARTIAL: CPT

## 2022-10-30 PROCEDURE — 82553 CREATINE MB FRACTION: CPT

## 2022-10-30 PROCEDURE — 84484 ASSAY OF TROPONIN QUANT: CPT

## 2022-10-30 PROCEDURE — 82947 ASSAY GLUCOSE BLOOD QUANT: CPT

## 2022-10-30 PROCEDURE — 99285 EMERGENCY DEPT VISIT HI MDM: CPT

## 2022-10-30 PROCEDURE — 85025 COMPLETE CBC W/AUTO DIFF WBC: CPT

## 2022-10-30 PROCEDURE — 85610 PROTHROMBIN TIME: CPT

## 2022-10-30 PROCEDURE — 83874 ASSAY OF MYOGLOBIN: CPT

## 2022-10-30 PROCEDURE — 80048 BASIC METABOLIC PNL TOTAL CA: CPT

## 2022-10-30 PROCEDURE — 6360000004 HC RX CONTRAST MEDICATION: Performed by: STUDENT IN AN ORGANIZED HEALTH CARE EDUCATION/TRAINING PROGRAM

## 2022-10-30 PROCEDURE — 70450 CT HEAD/BRAIN W/O DYE: CPT

## 2022-10-30 PROCEDURE — 6370000000 HC RX 637 (ALT 250 FOR IP): Performed by: STUDENT IN AN ORGANIZED HEALTH CARE EDUCATION/TRAINING PROGRAM

## 2022-10-30 PROCEDURE — 2580000003 HC RX 258: Performed by: STUDENT IN AN ORGANIZED HEALTH CARE EDUCATION/TRAINING PROGRAM

## 2022-10-30 PROCEDURE — 36415 COLL VENOUS BLD VENIPUNCTURE: CPT

## 2022-10-30 PROCEDURE — 82550 ASSAY OF CK (CPK): CPT

## 2022-10-30 PROCEDURE — 70496 CT ANGIOGRAPHY HEAD: CPT

## 2022-10-30 RX ORDER — CLOPIDOGREL BISULFATE 75 MG/1
300 TABLET ORAL ONCE
Status: COMPLETED | OUTPATIENT
Start: 2022-10-30 | End: 2022-10-30

## 2022-10-30 RX ORDER — 0.9 % SODIUM CHLORIDE 0.9 %
80 INTRAVENOUS SOLUTION INTRAVENOUS ONCE
Status: COMPLETED | OUTPATIENT
Start: 2022-10-30 | End: 2022-10-31

## 2022-10-30 RX ORDER — SODIUM CHLORIDE 0.9 % (FLUSH) 0.9 %
10 SYRINGE (ML) INJECTION 2 TIMES DAILY
Status: DISCONTINUED | OUTPATIENT
Start: 2022-10-30 | End: 2022-10-31 | Stop reason: HOSPADM

## 2022-10-30 RX ORDER — ASPIRIN 81 MG/1
81 TABLET, CHEWABLE ORAL ONCE
Status: COMPLETED | OUTPATIENT
Start: 2022-10-30 | End: 2022-10-30

## 2022-10-30 RX ADMIN — SODIUM CHLORIDE 80 ML: 9 INJECTION, SOLUTION INTRAVENOUS at 22:15

## 2022-10-30 RX ADMIN — IOPAMIDOL 75 ML: 755 INJECTION, SOLUTION INTRAVENOUS at 22:14

## 2022-10-30 RX ADMIN — ASPIRIN 81 MG: 81 TABLET, CHEWABLE ORAL at 23:19

## 2022-10-30 RX ADMIN — SODIUM CHLORIDE, PRESERVATIVE FREE 10 ML: 5 INJECTION INTRAVENOUS at 22:14

## 2022-10-30 RX ADMIN — CLOPIDOGREL BISULFATE 300 MG: 75 TABLET ORAL at 23:19

## 2022-10-30 ASSESSMENT — ENCOUNTER SYMPTOMS
ABDOMINAL PAIN: 0
BACK PAIN: 0
SHORTNESS OF BREATH: 0
NAUSEA: 0
COLOR CHANGE: 0
DIARRHEA: 0
RHINORRHEA: 0
VOMITING: 0
VOICE CHANGE: 0
TROUBLE SWALLOWING: 0

## 2022-10-31 ENCOUNTER — HOSPITAL ENCOUNTER (INPATIENT)
Age: 37
LOS: 3 days | Discharge: HOME | DRG: 058 | End: 2022-11-03
Attending: PSYCHIATRY & NEUROLOGY | Admitting: PSYCHIATRY & NEUROLOGY
Payer: MEDICARE

## 2022-10-31 ENCOUNTER — APPOINTMENT (OUTPATIENT)
Dept: MRI IMAGING | Age: 37
DRG: 058 | End: 2022-10-31
Attending: PSYCHIATRY & NEUROLOGY
Payer: MEDICARE

## 2022-10-31 VITALS
TEMPERATURE: 97.5 F | DIASTOLIC BLOOD PRESSURE: 69 MMHG | OXYGEN SATURATION: 100 % | WEIGHT: 287 LBS | BODY MASS INDEX: 50.85 KG/M2 | HEIGHT: 63 IN | RESPIRATION RATE: 13 BRPM | HEART RATE: 70 BPM | SYSTOLIC BLOOD PRESSURE: 121 MMHG

## 2022-10-31 PROBLEM — N93.9 ABNORMAL UTERINE BLEEDING (AUB): Status: ACTIVE | Noted: 2022-10-31

## 2022-10-31 PROBLEM — I67.5 MOYAMOYA: Status: ACTIVE | Noted: 2022-10-31

## 2022-10-31 PROBLEM — R20.2 NUMBNESS AND TINGLING: Status: ACTIVE | Noted: 2022-10-31

## 2022-10-31 PROBLEM — R20.0 NUMBNESS AND TINGLING: Status: ACTIVE | Noted: 2022-10-31

## 2022-10-31 PROBLEM — I67.5 MOYA MOYA DISEASE: Status: ACTIVE | Noted: 2022-10-31

## 2022-10-31 LAB
ANION GAP SERPL CALCULATED.3IONS-SCNC: 7 MMOL/L (ref 9–17)
BUN BLDV-MCNC: 12 MG/DL (ref 6–20)
CALCIUM SERPL-MCNC: 8.2 MG/DL (ref 8.6–10.4)
CHLORIDE BLD-SCNC: 103 MMOL/L (ref 98–107)
CHOLESTEROL/HDL RATIO: 3.1
CHOLESTEROL: 148 MG/DL
CO2: 22 MMOL/L (ref 20–31)
CREAT SERPL-MCNC: 0.61 MG/DL (ref 0.5–0.9)
ESTIMATED AVERAGE GLUCOSE: 103 MG/DL
FERRITIN: 6 NG/ML (ref 13–150)
GFR SERPL CREATININE-BSD FRML MDRD: >60 ML/MIN/1.73M2
GLUCOSE BLD-MCNC: 87 MG/DL (ref 70–99)
HBA1C MFR BLD: 5.2 % (ref 4–6)
HCT VFR BLD CALC: 21.8 % (ref 36.3–47.1)
HCT VFR BLD CALC: 22.5 % (ref 36.3–47.1)
HCT VFR BLD CALC: 22.9 % (ref 36.3–47.1)
HDLC SERPL-MCNC: 48 MG/DL
HEMOGLOBIN: 6.5 G/DL (ref 11.9–15.1)
HEMOGLOBIN: 6.8 G/DL (ref 11.9–15.1)
HEMOGLOBIN: 7 G/DL (ref 11.9–15.1)
IRON SATURATION: 3 % (ref 20–55)
IRON: 12 UG/DL (ref 37–145)
LDL CHOLESTEROL: 87 MG/DL (ref 0–130)
MCH RBC QN AUTO: 23.9 PG (ref 25.2–33.5)
MCHC RBC AUTO-ENTMCNC: 29.8 G/DL (ref 28.4–34.8)
MCV RBC AUTO: 80.1 FL (ref 82.6–102.9)
NRBC AUTOMATED: 0 PER 100 WBC
PDW BLD-RTO: 13.7 % (ref 11.8–14.4)
PLATELET # BLD: 311 K/UL (ref 138–453)
PMV BLD AUTO: 10.3 FL (ref 8.1–13.5)
POTASSIUM SERPL-SCNC: 3.7 MMOL/L (ref 3.7–5.3)
RBC # BLD: 2.72 M/UL (ref 3.95–5.11)
REASON FOR REJECTION: NORMAL
SODIUM BLD-SCNC: 132 MMOL/L (ref 135–144)
TOTAL IRON BINDING CAPACITY: 354 UG/DL (ref 250–450)
TRIGL SERPL-MCNC: 66 MG/DL
UNSATURATED IRON BINDING CAPACITY: 342 UG/DL (ref 112–347)
WBC # BLD: 9.1 K/UL (ref 3.5–11.3)
ZZ NTE CLEAN UP: ORDERED TEST: NORMAL
ZZ NTE WITH NAME CLEAN UP: SPECIMEN SOURCE: NORMAL

## 2022-10-31 PROCEDURE — 99255 IP/OBS CONSLTJ NEW/EST HI 80: CPT | Performed by: PSYCHIATRY & NEUROLOGY

## 2022-10-31 PROCEDURE — 36430 TRANSFUSION BLD/BLD COMPNT: CPT

## 2022-10-31 PROCEDURE — 2580000003 HC RX 258: Performed by: NURSE PRACTITIONER

## 2022-10-31 PROCEDURE — 6370000000 HC RX 637 (ALT 250 FOR IP): Performed by: HEALTH CARE PROVIDER

## 2022-10-31 PROCEDURE — 6360000002 HC RX W HCPCS: Performed by: NURSE PRACTITIONER

## 2022-10-31 PROCEDURE — 83550 IRON BINDING TEST: CPT

## 2022-10-31 PROCEDURE — 83540 ASSAY OF IRON: CPT

## 2022-10-31 PROCEDURE — 70551 MRI BRAIN STEM W/O DYE: CPT

## 2022-10-31 PROCEDURE — 80048 BASIC METABOLIC PNL TOTAL CA: CPT

## 2022-10-31 PROCEDURE — P9016 RBC LEUKOCYTES REDUCED: HCPCS

## 2022-10-31 PROCEDURE — 82728 ASSAY OF FERRITIN: CPT

## 2022-10-31 PROCEDURE — 99223 1ST HOSP IP/OBS HIGH 75: CPT | Performed by: PSYCHIATRY & NEUROLOGY

## 2022-10-31 PROCEDURE — 6370000000 HC RX 637 (ALT 250 FOR IP): Performed by: STUDENT IN AN ORGANIZED HEALTH CARE EDUCATION/TRAINING PROGRAM

## 2022-10-31 PROCEDURE — 83036 HEMOGLOBIN GLYCOSYLATED A1C: CPT

## 2022-10-31 PROCEDURE — 86920 COMPATIBILITY TEST SPIN: CPT

## 2022-10-31 PROCEDURE — 2580000003 HC RX 258: Performed by: HEALTH CARE PROVIDER

## 2022-10-31 PROCEDURE — 80061 LIPID PANEL: CPT

## 2022-10-31 PROCEDURE — 2000000000 HC ICU R&B

## 2022-10-31 PROCEDURE — 85014 HEMATOCRIT: CPT

## 2022-10-31 PROCEDURE — 6370000000 HC RX 637 (ALT 250 FOR IP): Performed by: NURSE PRACTITIONER

## 2022-10-31 PROCEDURE — 85018 HEMOGLOBIN: CPT

## 2022-10-31 PROCEDURE — 86900 BLOOD TYPING SEROLOGIC ABO: CPT

## 2022-10-31 PROCEDURE — 94761 N-INVAS EAR/PLS OXIMETRY MLT: CPT

## 2022-10-31 PROCEDURE — 85027 COMPLETE CBC AUTOMATED: CPT

## 2022-10-31 PROCEDURE — 36415 COLL VENOUS BLD VENIPUNCTURE: CPT

## 2022-10-31 PROCEDURE — 92523 SPEECH SOUND LANG COMPREHEN: CPT

## 2022-10-31 PROCEDURE — 86901 BLOOD TYPING SEROLOGIC RH(D): CPT

## 2022-10-31 PROCEDURE — 86850 RBC ANTIBODY SCREEN: CPT

## 2022-10-31 RX ORDER — SODIUM CHLORIDE 9 MG/ML
INJECTION, SOLUTION INTRAVENOUS CONTINUOUS
Status: DISCONTINUED | OUTPATIENT
Start: 2022-10-31 | End: 2022-11-02

## 2022-10-31 RX ORDER — ATORVASTATIN CALCIUM 80 MG/1
80 TABLET, FILM COATED ORAL NIGHTLY
Status: DISCONTINUED | OUTPATIENT
Start: 2022-10-31 | End: 2022-11-03 | Stop reason: HOSPADM

## 2022-10-31 RX ORDER — METOPROLOL SUCCINATE 100 MG/1
100 TABLET, EXTENDED RELEASE ORAL DAILY
Status: DISCONTINUED | OUTPATIENT
Start: 2022-10-31 | End: 2022-11-02

## 2022-10-31 RX ORDER — ASPIRIN 81 MG/1
81 TABLET ORAL DAILY
Status: DISCONTINUED | OUTPATIENT
Start: 2022-10-31 | End: 2022-11-03 | Stop reason: HOSPADM

## 2022-10-31 RX ORDER — ONDANSETRON 4 MG/1
4 TABLET, ORALLY DISINTEGRATING ORAL EVERY 8 HOURS PRN
Status: DISCONTINUED | OUTPATIENT
Start: 2022-10-31 | End: 2022-11-03 | Stop reason: HOSPADM

## 2022-10-31 RX ORDER — SENNA AND DOCUSATE SODIUM 50; 8.6 MG/1; MG/1
2 TABLET, FILM COATED ORAL DAILY
Status: DISCONTINUED | OUTPATIENT
Start: 2022-10-31 | End: 2022-11-03 | Stop reason: HOSPADM

## 2022-10-31 RX ORDER — SODIUM CHLORIDE 9 MG/ML
INJECTION, SOLUTION INTRAVENOUS PRN
Status: DISCONTINUED | OUTPATIENT
Start: 2022-10-31 | End: 2022-11-03

## 2022-10-31 RX ORDER — POLYETHYLENE GLYCOL 3350 17 G/17G
17 POWDER, FOR SOLUTION ORAL DAILY PRN
Status: DISCONTINUED | OUTPATIENT
Start: 2022-10-31 | End: 2022-11-03 | Stop reason: HOSPADM

## 2022-10-31 RX ORDER — KETOROLAC TROMETHAMINE 30 MG/ML
15 INJECTION, SOLUTION INTRAMUSCULAR; INTRAVENOUS EVERY 6 HOURS PRN
Status: DISCONTINUED | OUTPATIENT
Start: 2022-10-31 | End: 2022-11-03 | Stop reason: HOSPADM

## 2022-10-31 RX ORDER — ONDANSETRON 2 MG/ML
4 INJECTION INTRAMUSCULAR; INTRAVENOUS EVERY 6 HOURS PRN
Status: DISCONTINUED | OUTPATIENT
Start: 2022-10-31 | End: 2022-11-03 | Stop reason: HOSPADM

## 2022-10-31 RX ORDER — LANOLIN ALCOHOL/MO/W.PET/CERES
325 CREAM (GRAM) TOPICAL 2 TIMES DAILY WITH MEALS
Status: DISCONTINUED | OUTPATIENT
Start: 2022-11-03 | End: 2022-11-03 | Stop reason: HOSPADM

## 2022-10-31 RX ORDER — PANTOPRAZOLE SODIUM 40 MG/1
40 TABLET, DELAYED RELEASE ORAL
Status: DISCONTINUED | OUTPATIENT
Start: 2022-10-31 | End: 2022-11-03 | Stop reason: HOSPADM

## 2022-10-31 RX ORDER — 0.9 % SODIUM CHLORIDE 0.9 %
1000 INTRAVENOUS SOLUTION INTRAVENOUS ONCE
Status: COMPLETED | OUTPATIENT
Start: 2022-10-31 | End: 2022-10-31

## 2022-10-31 RX ORDER — LANOLIN ALCOHOL/MO/W.PET/CERES
325 CREAM (GRAM) TOPICAL 2 TIMES DAILY WITH MEALS
Status: DISCONTINUED | OUTPATIENT
Start: 2022-10-31 | End: 2022-10-31

## 2022-10-31 RX ORDER — CLOPIDOGREL BISULFATE 75 MG/1
75 TABLET ORAL DAILY
Status: DISCONTINUED | OUTPATIENT
Start: 2022-10-31 | End: 2022-11-03 | Stop reason: HOSPADM

## 2022-10-31 RX ADMIN — SODIUM CHLORIDE 1000 ML: 9 INJECTION, SOLUTION INTRAVENOUS at 14:54

## 2022-10-31 RX ADMIN — FERROUS SULFATE TAB EC 325 MG (65 MG FE EQUIVALENT) 325 MG: 325 (65 FE) TABLET DELAYED RESPONSE at 10:04

## 2022-10-31 RX ADMIN — NORETHINDRONE ACETATE 5 MG: 5 TABLET ORAL at 20:16

## 2022-10-31 RX ADMIN — ATORVASTATIN CALCIUM 80 MG: 80 TABLET, FILM COATED ORAL at 20:16

## 2022-10-31 RX ADMIN — IRON SUCROSE 500 MG: 20 INJECTION, SOLUTION INTRAVENOUS at 12:12

## 2022-10-31 RX ADMIN — CLOPIDOGREL 75 MG: 75 TABLET, FILM COATED ORAL at 17:11

## 2022-10-31 RX ADMIN — PANTOPRAZOLE SODIUM 40 MG: 40 TABLET, DELAYED RELEASE ORAL at 08:25

## 2022-10-31 RX ADMIN — SODIUM CHLORIDE: 9 INJECTION, SOLUTION INTRAVENOUS at 05:19

## 2022-10-31 RX ADMIN — ONDANSETRON 4 MG: 4 TABLET, ORALLY DISINTEGRATING ORAL at 15:13

## 2022-10-31 RX ADMIN — Medication 81 MG: at 17:11

## 2022-10-31 ASSESSMENT — PAIN SCALES - GENERAL
PAINLEVEL_OUTOF10: 0

## 2022-10-31 NOTE — ED NOTES
Transport came to get the patient and transfer to [facility].      Douglas Abbott, VANNESA  51/27/15 5828

## 2022-10-31 NOTE — ED NOTES
TRANSFER - OUT REPORT:    Verbal report given to [employer] on [employer]  being transferred to [facility] for routine progression of patient care       Report consisted of patient's Situation, Background, Assessment and   Recommendations(SBAR). Information from the following report(s) ED Encounter Summary was reviewed with the receiving nurse. Lines:   Peripheral IV 10/30/22 Left Antecubital (Active)   Site Assessment Clean, dry & intact 10/30/22 2158   Line Status Blood return noted;Specimen collected 10/30/22 2158   Phlebitis Assessment No symptoms 10/30/22 2158   Infiltration Assessment 0 10/30/22 2158   Dressing Status New dressing applied 10/30/22 2158   Dressing Type Transparent 10/30/22 2158   Dressing Intervention New 10/30/22 2158        Opportunity for questions and clarification was provided.       Patient transported with:  EMS     Omar Duke RN  02/85/82 0204

## 2022-10-31 NOTE — CARE COORDINATION
Case Management Assessment  Initial Evaluation    Date/Time of Evaluation: 10/31/2022 11:49 AM  Assessment Completed by: Lawrence Nazario RN    If patient is discharged prior to next notation, then this note serves as note for discharge by case management. 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 Chart Reviewed: Yes      History Provided by: Patient  Patient Orientation: Alert and Oriented    Patient Cognition: Alert    Hospitalization in the last 30 days (Readmission):  No    If yes, Readmission Assessment in CM Navigator will be completed. Advance Directives:      Code Status: Full Code   Patient's Primary Decision Maker is: Legal Next of Kin      Discharge Planning:    Patient lives with: Spouse/Significant Other Type of Home: House  Primary Care Giver: Self  Patient Support Systems include: Spouse/Significant Other, Children   Current Financial resources:    Current community resources:    Current services prior to admission: None            Current DME:              Type of Home Care services:  None    ADLS  Prior functional level: Independent in ADLs/IADLs  Current functional level: Independent in ADLs/IADLs    PT AM-PAC:   /24  OT AM-PAC:   /24    Family can provide assistance at DC: Yes  Would you like Case Management to discuss the discharge plan with any other family members/significant others, and if so, who?  Yes  Plans to Return to Present Housing: Yes  Other Identified Issues/Barriers to RETURNING to current housing:   Potential Assistance needed at discharge: Home Care            Potential DME:    Patient expects to discharge to: [address] [place] [address] for transportation at discharge: Financial    Payor: [employer] / Plan: [employer] / Product Type: *No Product type* /     Does insurance require precert for SNF: No    Potential assistance Purchasing Medications: No  [facility] [id number] [address]  [address] [facility] [address]  Phone: [phone] Fax: [phone]      Notes:    Factors facilitating achievement of predicted outcomes: Family support and Motivated    Barriers to discharge: Decreased endurance    Additional Case Management Notes: Pt lives with gypsy and their son. 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 Freedom of choice list with basic dialogue that supports the patient's individualized plan of care/goals and shares the quality data associated with the providers was provided to:     Patient Representative Name:       The Patient and/or Patient Representative Agree with the Discharge Plan?       Nilda Reich RN  Case Management Department  Ph: [phone] Fax:

## 2022-10-31 NOTE — PROGRESS NOTES
1500 Pt complaining of large amount of cramping to lower abd. Blood pressure noted to drop and pt complained that she is dizzy when attempting to get to the bathroom. Pt layed flat, Nirali informed of drop in pressure and lightheaded, dizziness. Fluid bolus started. Message sent and call returned from GYN resident regarding the above. Informed of increased bleeding, cramping, and dizziness. Orders received.

## 2022-10-31 NOTE — ED PROVIDER NOTES
[address] [facility] ED  Emergency Department Encounter  Emergency Medicine Resident     [address] Rajesh Stewart  MRN: [id number]  [place] 1985  Date of evaluation: 10/30/22  PCP:  Manuel Perez, [address] [place] [address]       Chief Complaint   Patient presents with    Numbness           Aphasia       HISTORY OF PRESENT ILLNESS  (Location/Symptom, Timing/Onset, Context/Setting, Quality, Duration, Modifying Factors, Severity.)      Raudel De Leon is a 40 y.o. female who presents with complaint of right-sided arm numbness as well as slurred speech and right facial droop. Past medical history sniffing for hypertension, hyperlipidemia as well as diabetes. Patient states that she was cooking with her fiancé approximately 2 hours ago when he noticed that she was slurring her speech her speech had a right-sided facial droop. Patient was also complaining of numbness in her arm. She states that her right-sided facial droop and slurred speech have improved she still has consistent right arm numbness. Denies any fever or chills or blood thinner use. Does have a strong family history of acute CVA. Denies any chest pain, shortness of breath, neck stiffness, blurry vision, double vision, difficulty swallowing, bowel pain, nausea vomiting or diarrhea. PAST MEDICAL / SURGICAL / SOCIAL / FAMILY HISTORY      has a past medical history of Class 3 severe obesity due to excess calories without serious comorbidity with body mass index (BMI) of 60.0 to 69.9 in [facility]), Essential hypertension, Hyperlipidemia, Hyperlipidemia, Pneumonia due to COVID-19 virus, Prediabetes, and Under care of team.       has a past surgical history that includes  section; intrauterine device insertion (2021); Upper gastrointestinal endoscopy (N/A, 2021); [place] tooth extraction; Sleeve Gastrectomy (2022); and Sleeve Gastrectomy (N/A, 3/7/2022).       Social History     Socioeconomic History    Marital status: Single     Spouse name: Not on file    Number of children: 1    Years of education: Not on file    Highest education level: Not on file   Occupational History    Not on file   Tobacco Use    Smoking status: Never    Smokeless tobacco: Never   Vaping Use    Vaping Use: Never used   Substance and Sexual Activity    Alcohol use: No    Drug use: No    Sexual activity: Not Currently   Other Topics Concern    Not on file   Social History Narrative    ** Merged History Encounter **          Social Determinants of Health     Financial Resource Strain: Low Risk     Difficulty of Paying Living Expenses: Not hard at all   Food Insecurity: No Food Insecurity    Worried About Running Out of Food in the Last Year: Never true    Ran Out of Food in the Last Year: Never true   Transportation Needs: Not on file   Physical Activity: Not on file   Stress: Not on file   Social Connections: Not on file   Intimate Partner Violence: Not on file   Housing Stability: Not on file       Family History   Problem Relation Age of Onset    Heart Disease Mother     Other Mother     Heart Disease Father     Other Father     Depression Mother     Diabetes Mother     High Blood Pressure Mother     Diabetes Maternal Grandmother     High Blood Pressure Maternal Grandmother     Kidney Disease Maternal Grandmother     Cancer Maternal Grandfather         prostate    Diabetes Maternal Grandfather     Heart Disease Maternal Grandfather     High Blood Pressure Maternal Grandfather     Cancer Paternal Grandmother         breast and brain       Allergies:  Vancomycin    Home Medications:  Prior to Admission medications    Medication Sig Start Date End Date Taking?  Authorizing Provider   ondansetron (ZOFRAN) 4 MG tablet Take 1 tablet by mouth every 8 hours as needed for Nausea or Vomiting 9/22/22   Heyward Dubin, APRN - CNP   pantoprazole (PROTONIX) 40 MG tablet Take 1 tablet by mouth daily 9/22/22   Heyward Dubin, APRN - CNP   sucralfate (CARAFATE) 1 GM tablet Take 1 tablet by mouth 4 times daily 9/22/22   JULIETA Eaton CNP   ferrous sulfate (IRON 325) 325 (65 Fe) MG tablet Take 1 tablet by mouth daily (with breakfast) 9/13/22   JULIETA Eaton CNP   metoprolol succinate (TOPROL XL) 100 MG extended release tablet TAKE ONE TABLET BY MOUTH DAILY 8/1/22   Malini Rodriguez DO   vitamin D (ERGOCALCIFEROL) 1.25 MG (69583 UT) CAPS capsule Take 1 capsule by mouth once a week for 8 doses 6/8/22 7/28/22  JULIETA Eaton CNP   Multiple Vitamins-Minerals (THERAPEUTIC MULTIVITAMIN-MINERALS) tablet Take 1 tablet by mouth in the morning and at bedtime MVI from [facility] 96 Provider, MD   calcium carbonate (OSCAL) 500 MG TABS tablet Take 500 mg by mouth daily Bariatric fusion    Historical Provider, MD   promethazine (PHENERGAN) 25 MG tablet Take 1 tablet by mouth every 6 hours as needed for Nausea 3/8/22   Wendie Burris MD       REVIEW OF SYSTEMS    (2-9 systems for level 4, 10 or more for level 5)      Review of Systems   Constitutional:  Negative for chills and fever. HENT:  Negative for congestion, rhinorrhea, trouble swallowing and voice change. Eyes:  Negative for visual disturbance. Respiratory:  Negative for shortness of breath. Cardiovascular:  Negative for chest pain. Gastrointestinal:  Negative for abdominal pain, diarrhea, nausea and vomiting. Musculoskeletal:  Negative for back pain and neck pain. Skin:  Negative for color change, pallor, rash and wound. Neurological:  Positive for facial asymmetry, speech difficulty and numbness. Negative for weakness and headaches. PHYSICAL EXAM   (up to 7 for level 4, 8 or more for level 5)      INITIAL VITALS:   BP (!) 143/73   Pulse 71   Temp 98.4 °F (36.9 °C) (Oral)   Resp 18   Ht 5' 3\" (1.6 m)   Wt 287 lb (130.2 kg)   SpO2 100%   BMI 50.84 kg/m²     Physical Exam  Constitutional:       General: She is not in acute distress. Appearance: She is obese.  She is not ill-appearing, toxic-appearing or diaphoretic. HENT:      Head: Normocephalic and atraumatic. Eyes:      Extraocular Movements: Extraocular movements intact. Pupils: Pupils are equal, round, and reactive to light. Cardiovascular:      Rate and Rhythm: Normal rate and regular rhythm. Heart sounds: No murmur heard. No friction rub. No gallop. Pulmonary:      Effort: No respiratory distress. Breath sounds: No stridor. No wheezing, rhonchi or rales. Chest:      Chest wall: No tenderness. Abdominal:      General: There is no distension. Palpations: There is no mass. Tenderness: There is no abdominal tenderness. There is no guarding or rebound. Hernia: No hernia is present. Musculoskeletal:         General: No swelling, tenderness, deformity or signs of injury. Cervical back: No rigidity or tenderness. Skin:     Capillary Refill: Capillary refill takes less than 2 seconds. Coloration: Skin is not jaundiced or pale. Findings: No bruising, erythema, lesion or rash. Neurological:      Mental Status: She is alert and oriented to person, place, and time. Cranial Nerves: No cranial nerve deficit. Sensory: Sensory deficit present. Motor: No weakness.       Coordination: Coordination normal.      Comments: No pronator drift, no facial symmetry, right upper extremity decreased sensation       DIFFERENTIAL  DIAGNOSIS     PLAN (LABS / IMAGING / EKG):  Orders Placed This Encounter   Procedures    CT HEAD WO CONTRAST    CTA HEAD NECK W CONTRAST    Comprehensive Metabolic Panel w/ Reflex to MG    STROKE PANEL    CK    Myoglobin, Blood    Troponin    Inpatient consult to Stroke Team    POC Glucose Fingerstick       MEDICATIONS ORDERED:  Orders Placed This Encounter   Medications    iopamidol (ISOVUE-370) 76 % injection 75 mL    0.9 % sodium chloride bolus    sodium chloride flush 0.9 % injection 10 mL    aspirin chewable tablet 81 mg    clopidogrel (PLAVIX) tablet 300 mg       DDX: Acute CVA, electrolyte abnormality, intracranial hemorrhage    DIAGNOSTIC RESULTS / EMERGENCY DEPARTMENT COURSE / MDM   LAB RESULTS:  Results for orders placed or performed during the hospital encounter of 10/30/22   Comprehensive Metabolic Panel w/ Reflex to MG   Result Value Ref Range    Glucose 94 70 - 99 mg/dL    BUN 14 6 - 20 mg/dL    Creatinine 0.77 0.50 - 0.90 mg/dL    Est, Glom Filt Rate >60 >60 mL/min/1.73m2    Calcium 8.6 8.6 - 10.4 mg/dL    Sodium 135 135 - 144 mmol/L    Potassium 4.1 3.7 - 5.3 mmol/L    Chloride 101 98 - 107 mmol/L    CO2 24 20 - 31 mmol/L    Anion Gap 10 9 - 17 mmol/L    Alkaline Phosphatase 63 35 - 104 U/L    ALT 13 5 - 33 U/L    AST 15 <32 U/L    Total Bilirubin <0.2 (L) 0.3 - 1.2 mg/dL    Total Protein 6.7 6.4 - 8.3 g/dL    Albumin 3.8 3.5 - 5.2 g/dL   STROKE PANEL   Result Value Ref Range    Glucose ADDED ON mg/dL    BUN ADDED ON mg/dL    Creatinine ADDED ON mg/dL    Est, Glom Filt Rate ADDED ON >60 mL/min/1.73m2    Bun/Cre Ratio ADDED ON 9 - 20    Calcium ADDED ON mg/dL    Sodium ADDED ON mmol/L    Potassium ADDED ON mmol/L    Chloride ADDED ON mmol/L    CO2 ADDED ON mmol/L    Anion Gap ADDED ON mmol/L    WBC 10.3 3.5 - 11.0 k/uL    RBC 2.98 (L) 4.0 - 5.2 m/uL    Hemoglobin 7.2 (L) 12.0 - 16.0 g/dL    Hematocrit 22.0 (L) 36 - 46 %    MCV 73.8 (L) 80 - 100 fL    MCH 24.0 (L) 26 - 34 pg    MCHC 32.6 31 - 37 g/dL    RDW 14.8 11.5 - 14.9 %    Platelets 686 192 - 700 k/uL    MPV 7.7 6.0 - 12.0 fL    Total CK ADDED ON U/L    Seg Neutrophils 56 36 - 66 %    Lymphocytes 32 24 - 44 %    Monocytes 9 (H) 1 - 7 %    Eosinophils % 2 0 - 4 %    Basophils 1 0 - 2 %    Segs Absolute 5.80 1.3 - 9.1 k/uL    Absolute Lymph # 3.30 1.0 - 4.8 k/uL    Absolute Mono # 1.00 0.1 - 1.3 k/uL    Absolute Eos # 0.20 0.0 - 0.4 k/uL    Basophils Absolute 0.10 0.0 - 0.2 k/uL    Myoglobin ADDED ON ng/mL    Protime 14.4 11.8 - 14.6 sec    INR 1.1     PTT 28.2 24.0 - 36.0 sec Troponin, High Sensitivity ADDED ON 0 - 14 ng/L    Troponin T ADDED ON <0.03 ng/mL    Troponin Interp ADDED ON    CK   Result Value Ref Range    Total CK 87 26 - 192 U/L   Myoglobin, Blood   Result Value Ref Range    Myoglobin <21 (L) 25 - 58 ng/mL   Troponin   Result Value Ref Range    Troponin, High Sensitivity <6 0 - 14 ng/L   POC Glucose Fingerstick   Result Value Ref Range    POC Glucose 89 65 - 105 mg/dL       IMPRESSION: Labs unremarkable    RADIOLOGY:  CTA HEAD NECK W CONTRAST   Final Result   Severe stenosis in the distal supraclinoid segment of the right internal   carotid artery, with occlusion in the proximal M1 segment of the right MCA   and at origin of A1 segment of the right NEREIDA, with prominent collateral   vessels, 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 No acute abnormality or flow-limiting stenosis of the major arteries of the   neck. Results were reported to Dr. Jeronimo Running at 10:26 p.m. on October 30, 2022. CT HEAD WO CONTRAST   Final Result   Addendum (preliminary) 1 of 1   ADDENDUM:   Results were reported to Dr. Carolina Edge via radiology results communication at   10:20 p.m. on October 30, 2022. Final   No acute intracranial abnormality. EMERGENCY DEPARTMENT COURSE:  78-year-old female presenting with right arm numbness as well as slurred speech and right-sided facial droop. On exam patient just has right arm numbness. NIH of 1. However given that symptoms started approximately 2 hours ago stroke alert called. CT CTA ordered. Labs are unremarkable. Discussed with neuro ICU attending and there is concerns for moyamoya-like findings in the CTA of the head and neck. Neuro ICU attending are recommending loading patient with aspirin as well as Plavix. Would like patient transferred to neuro ICU for further management as well as diagnostic angiogram tomorrow. Findings were discussed with patient. Telemetry monitor was discussed with patient. Patient is agreeable to transfer. ED Course as of 10/30/22 2346   Sun Oct 30, 2022   2201 Stroke alert called at 10 PM. [MS]   2224 Discussed with neurologist at [facility]. Patient is concern for moyamoya and will call the radiologist to discuss. Will recontact us. [MS]   2258 We will initiate transfer to neuro ICU at [facility] for moyamoya work-up. [MS]      ED Course User Index  [MS] Jackson Davis DO       No notes of [facility] Admission Criteria type on file. PROCEDURES:  N/a    CONSULTS:  IP CONSULT TO STROKE TEAM    CRITICAL CARE:  35 min    FINAL IMPRESSION      1. Moyamoya          DISPOSITION / PLAN     DISPOSITION Decision To Transfer 10/30/2022 10:33:15 PM      PATIENT REFERRED TO:  No follow-up provider specified.     DISCHARGE MEDICATIONS:  New Prescriptions    No medications on file       Patrizia Bro DO  Emergency Medicine Resident    (Please note that portions of thisnote were completed with a voice recognition program.  Efforts were made to edit the dictations but occasionally words are mis-transcribed.)        Jackson Davis DO  Resident  10/30/22 [id number]

## 2022-10-31 NOTE — PROGRESS NOTES
Gynecology Progress Note    Date: 2022  Time: 6:56 AM    Jorge L Longoria is a 40 y.o. female  HD# 2    Patient was seen and examined. She continues to complain of vaginal bleeding. Pain is  controlled. Patient is NPO for her angiogram today. She is urinating well . She is  ambulating without difficulty. She is  passing flatus. She denies Fever/Chills, Chest Pain, SOB, N/V, Calf Pain    Vitals:  Vitals:    22 0430 22 0445 22 0500 22 0515   BP: (!) 147/86 (!) 160/84 (!) 160/87 (!) 150/71   Pulse: 70 63 67 64   Resp: 23 20 21 21   Temp:       TempSrc:       SpO2: 100% 100% 99% 100%   Weight:       Height:             Intake/Output:   Last Shift: I/O last 3 completed shifts: In: 4194.2 [P.O.:500; I.V.:1800; Blood:1118.3; IV Piggyback:775.8]  Out: -   Current Shift: I/O this shift:  In: 310 [Blood:310]  Out: -     Physical Exam:  General:  no apparent distress, alert, and cooperative  Neurologic:  alert, oriented, normal speech, no focal findings or movement disorder noted  Lungs:  No increased work of breathing, good air exchange, clear to auscultation bilaterally, no crackles or wheezing  Heart:  regular rate and rhythm and no murmur    Abdomen: Abdomen soft, non-tender.  BS normal. No masses,  No organomegaly  Extremities:  no calf tenderness, non edematous    Lab:  Complete Blood Count:   Recent Labs     10/30/22  2200 10/31/22  0542 10/31/22  1511 10/31/22  2321 22  0215   WBC 10.3 9.1  --   --  15.0*   HGB 7.2* 6.5* 6.8* 7.0* 6.8*   HCT 22.0* 21.8* 22.5* 22.9* 21.3*    311  --   --  315        PT/INR:    Lab Results   Component Value Date/Time    PROTIME 14.4 10/30/2022 10:00 PM    INR 1.1 10/30/2022 10:00 PM     PTT:    Lab Results   Component Value Date/Time    APTT 28.2 10/30/2022 10:00 PM       Comprehensive Metabolic Profile:   Recent Labs     10/30/22  2200 10/31/22  0416 22  0215   NA ADDED * 136   K ADDED ON 3.7 3.8   CL ADDED  108* CO2 ADDED ON 22 20   BUN ADDED ON 12 9   CREATININE ADDED ON 0.61 0.52   GLUCOSE ADDED ON 87 125*   CALCIUM ADDED ON 8.2* 7.8*   PROT  --   --  5.3*   LABALBU  --   --  2.9*   BILITOT  --   --  0.4   ALKPHOS  --   --  42   AST  --   --  18   ALT  --   --  8        Assessment/Plan:  Vikas Zuñiga 40 y.o. female [id number] HD# 2 AUB 2/2 uterine fibroids   - Patient doing well, states that her bleeding overnight has stayed the same- she continues to pass large clots   - Pelvic exam on 10/31 was positive for enlarged, fibroid uterus   - Doing well, vitals stable   - Hgb 7.2>6.5>1u>6.8> 1u> 7.0> 6.8>1 unit   - H&H post transfusion pending   - CBC and BMP this AM still pending   - Lupron 11.25 mg ordered however it is not in stock- pharmacy is going to order it today   - ASA 81 mg, Plavix 75 qd   - Venofer x 3 doses, PO iron   - Toradol 15 mg IV q 6hr as needed   - Aygestin 5 mg 3 times daily   - Continue to monitor     Ob/Gyn will continue to follow. Recommend continuing Toradol for pain if needed. Recommend to continue transfusions for Hg less than 7. Lupron to be ordered today and continue Aygestin. Counseled patient that the goal for this hospital stay is to get her bleeding controlled with medication to bridge her to the ultimate goal of a hysterectomy outpatient.     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     Priority: Medium    Numbness and tingling 10/31/2022     Priority: Medium    CLARISSA (iron deficiency anemia) 09/22/2022     Priority: Medium    Morbid obesity with BMI of 50.0-59.9, adult ([address] 06/09/2022     Priority: Medium    Abnormal uterine bleeding 10/31/2022     Overview Note:     AUB-L      Atypical glandular cells on cervical Pap smear 04/06/2022     Overview Note:     2022 - needs colp and EMB      S/P laparoscopic sleeve gastrectomy 03/07/2022    Esophageal dysphagia     Antral gastritis     IUD migration 07/02/2021    Vitamin D deficiency 06/02/2021    Essential hypertension 02/06/2019    Hyperlipidemia 05/08/2016    Pre-diabetes 05/08/2016         Sg Ford DO  Ob/Gyn Resident   11/1/2022, 6:56 AM         Attending Physician Statement  Discussed with pt that pharmacy is hoping to obtain lupron to help put her in a \"menopausal like state\" and help to decrease her bleeding. Plan is for hysterectomy in the future once she has clearance from neuro to be off blood thinners to have surgery. Pt is in agreement with this plan. Will continue to give aygestin until lupron is available. Please continue to transfuse if Hb if <7. Please continue to anticoagulate appropriately for her wild wild diagnosis. I have discussed the care of Yong Dye, including pertinent history and exam findings,  with the resident. I have reviewed the key elements of all parts of the encounter with the resident. I agree with the assessment, plan and orders as documented by the resident.   (Luis Enrique Hairston)    Brent Campos MD

## 2022-10-31 NOTE — PROGRESS NOTES
Writer unable to give Lupron due to it not being available. Instructed to leave the med on the [facility] until it can be obtained. Pharmacy unable to move the med to new administration date or time at this time.  Night oncoming nurse informed of this medication issue

## 2022-10-31 NOTE — PROGRESS NOTES
Pt up to chair and feeling much better. Attempted to eat dinner but became nauseated. C/O lightheadedness while returning to bed. Pt expelled large amount of blood and clots when standing. Returned to bed with vitals remaining stable. Blood transfusion continues.

## 2022-10-31 NOTE — PROGRESS NOTES
Speech Language Pathology  Facility/Department: [address] NEURO ICU  Initial Speech/Language/Cognitive Assessment    NAME: Brina Painting  : 1985   MRN: [id number]  ADMISSION DATE: 10/31/2022  ADMITTING DIAGNOSIS: has Hyperlipidemia; Pre-diabetes; Essential hypertension; Vitamin D deficiency; IUD migration; Esophageal dysphagia; Antral gastritis; S/P laparoscopic sleeve gastrectomy; Atypical glandular cells on cervical Pap smear; Morbid obesity with BMI of 50.0-59.9, adult (HCC); CLARISSA (iron deficiency anemia); Moyamoya; Wild wild disease; Numbness and tingling; and Abnormal uterine bleeding on their problem list.    Date of Eval: 10/31/2022   Evaluating Therapist: Corrie Rajput    RECENT RESULTS  CT OF HEAD/MRI:   Severe stenosis in the distal supraclinoid segment of the right internal   carotid artery, with occlusion in the proximal M1 segment of the right MCA   and at origin of A1 segment of the right NEREIDA, with prominent collateral   vessels, 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 No acute abnormality or flow-limiting stenosis of the major arteries of the   neck. Results were reported to Dr. Major Jaimes at 10:26 p.m. on 2022. Primary Complaint: The patient is a 40 y.o. female w/ PMHx significant for hypertension, hyperlipidemia, diabetes who presented to the ER with complaint of right-sided arm numbness as well as slurred speech and right facial droop. Patient states that she was cooking with her fiancé approximately 2 hours ago when he noticed that she was slurring her speech and had a right-sided facial droop. Patient was also complaining of numbness in her arm. At time of initial evaluation in the ER, she stated that her right-sided facial droop and slurred speech had improved she still was having consistent right arm numbness.   Denied any fever or chills or blood thinner use. Does have a strong family history of acute CVA. Denies any chest pain, shortness of breath, neck stiffness, blurry vision, double vision, difficulty swallowing, bowel pain, nausea vomiting or diarrhea. CTH negative for any intracranial abnormalities. 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 Given ASA and Plavix in the ER. Transferred to [facility] neuro ICU for further monitoring and workup. At time of initial examination, patient was still asymptomatic. AAOx3, GCS 15. No complaints at this time. Pain:  Pain Assessment  Pain Assessment: None - Denies Pain  Pain Level: 0      Vision/ Hearing  Vision  Vision: Within Functional Limits  Hearing  Hearing: Within functional limits      Assessment:  Pt presents with no apparent cognitive deficits at this time. No dysarthria noted, no oral motor deficits. No further ST is recommended. Verbal and written education provided. Naomi Sanchez M.S. Lacretia Signs  10/31/2022           Recommendations:  Recommendations  Requires SLP Intervention: No  Patient Education: Yes  Patient Education Response: Verbalizes understanding             Plan:   Individuals consulted  Consulted and agree with results and recommendations: Patient; Family member  Family member consulted: Sabra        Subjective:   Social/Functional History  Lives With: Spouse; Son  Type of Home: House  Active : Yes  Mode of Transportation: Car  Occupation: Full time employment  Type of Occupation: LPN  Vision  Vision: Within Functional Limits  Hearing  Hearing: Within functional limits           Objective:       Oral Motor   Labial: Impaired coordination; No impairment  Lingual: Decreased rate; Incoordinated    Motor Speech  Apraxic Characteristics: None  Intelligibility: No impairment  Overall Impairment Severity: None              Expression  Primary Mode of Expression: Verbal             Cognition:      Orientation  Overall Orientation Status: Within Normal Limits  Memory  Memory: Exceptions to [facility]  Short-term Memory: WFL (2/3 increased to 3/3, 3/3)  Working Memory: WFL  Problem Solving  Problem Solving: Within Functional Limits  Abstract Reasoning  Abstract Reasoning: Within Functional Limits  Safety/Judgment  Safety/Judgment: Within Functional Limits       Prognosis:  Individuals consulted  Consulted and agree with results and recommendations: Patient; Family member  Family member consulted: Sabra    Education:  Patient Education: Yes  Patient Education Response: Verbalizes understanding          Therapy Time:   Individual Concurrent Group Co-treatment   Time In  13:49         Time Out  13:56         Minutes  7                 Completed by:  Gareth Soares  Clinician    Cosigned By: Quiana Roach S.CCC/SLP

## 2022-10-31 NOTE — CONSULTS
continual with passage of large clots. The patient reports prior to the past year her menses have been regular and relatively light. She reports they would occur monthly, on schedule, and last 4 days with light-moderate bleeding. She denies history of significant dysmenorrhea. She has a history of 1 prior CS 13 years ago and a recent history of laparoscopic gastric sleeve 3/7/22.        REVIEW OF SYSTEMS:   Constitutional: negative fever, negative chills   HEENT: negative visual disturbances, negative headaches  Respiratory: negative dyspnea, negative cough  Cardiovascular: negative chest pain,  negative palpitations  Gastrointestinal: positive abdominal cramping, negative RUQ pain, negative N/V, negative diarrhea, negative constipation  Genitourinary: negative dysuria, negative vaginal discharge, positive heavy vaginal bleeding, passage of clots   Dermatological: negative rash  Hematologic: negative bruising  Immunologic/Lymphatic: negative recent illness, negative recent sick contact  Musculoskeletal: negative back pain, negative myalgias, negative arthralgias  Neurological:  negative dizziness, negative weakness  Behavior/Psych: negative depression, negative anxiety    _______________________________________________________________________    GYNECOLOGICAL HISTORY:  Age of Menarche: 9  Age of Menopause: N/A     Pap History:   4/15/19: LSIL, +HPV (other)  20: Atypical epithelial cells, cannot exclude high grade lesion, +HPV (other)  3/30/22: RUBIN, HPV neg  Colposcopy History:   19: Active chronic cervicitis, no ANA LAURA   10/13/20: MIKE-1    Permanent Sterilization: denies  Reversible Birth Control: denies  Hormone Replacement Exposure: denies      OBSTETRIC HISTORY:   OB History    Para Term  AB Living   1 1 1 0 0 1   SAB IAB Ectopic Molar Multiple Live Births   0 0 0 0 0 0      # Outcome Date GA Lbr Obdulio/2nd Weight Sex Delivery Anes PTL Lv   1 Term     M CS-Unspec          PAST MEDICAL HISTORY:   has a past medical history of Class 3 severe obesity due to excess calories without serious comorbidity with body mass index (BMI) of 60.0 to 69.9 in adult [facility]), Essential hypertension, Hyperlipidemia, Hyperlipidemia, Pneumonia due to COVID-19 virus, Prediabetes, and Under care of team.    PAST SURGICAL HISTORY:   has a past surgical history that includes  section; intrauterine device insertion (2021); Upper gastrointestinal endoscopy (N/A, 2021); Roberts tooth extraction; Sleeve Gastrectomy (2022); and Sleeve Gastrectomy (N/A, 3/7/2022).     ALLERGIES:  Allergies as of 10/30/2022 - Fully Reviewed 10/30/2022   Allergen Reaction Noted    Vancomycin Itching        MEDICATIONS:  Current Facility-Administered Medications   Medication Dose Route Frequency Provider Last Rate Last Admin    ondansetron (ZOFRAN-ODT) disintegrating tablet 4 mg  4 mg Oral Q8H PRN Harris Corti, DO   4 mg at 10/31/22 1513    Or    ondansetron (ZOFRAN) injection 4 mg  4 mg IntraVENous Q6H PRN Harris Corti, DO        polyethylene glycol (GLYCOLAX) packet 17 g  17 g Oral Daily PRN Harris Corti, DO        0.9 % sodium chloride infusion   IntraVENous Continuous Harris Corti, DO 75 mL/hr at 10/31/22 0519 New Bag at 10/31/22 0519    atorvastatin (LIPITOR) tablet 80 mg  80 mg Oral Nightly Harris Corti, DO        clopidogrel (PLAVIX) tablet 75 mg  75 mg Oral Daily Harris Corti, DO        aspirin EC tablet 81 mg  81 mg Oral Daily Harris Corti, DO        [Held by provider] metoprolol succinate (TOPROL XL) extended release tablet 100 mg  100 mg Oral Daily Harris Corti, DO        pantoprazole (PROTONIX) tablet 40 mg  40 mg Oral QAM AC Harris Corti, DO   40 mg at 10/31/22 0825    0.9 % sodium chloride infusion   IntraVENous PRN Ermalinda Nares, APRN - CNP        sennosides-docusate sodium (SENOKOT-S) 8.6-50 MG tablet 2 tablet  2 tablet Oral Daily Ermalinda Nares, APRN - CNP        iron sucrose (VENOFER) 500 mg in sodium chloride 0.9 % 250 mL IVPB  500 mg IntraVENous Q24H JULIETA Adams CNP 78.6 mL/hr at 10/31/22 1212 500 mg at 10/31/22 1212    [START ON 11/3/2022] ferrous sulfate (FE TABS 325) EC tablet 325 mg  325 mg Oral BID WC JULIETA Adams CNP        leuprolide (LUPRON) injection 11.25 mg  11.25 mg IntraMUSCular Once Viviane Jenkins DO        ketorolac (TORADOL) injection 15 mg  15 mg IntraVENous Q6H PRN Viviane Jenkins DO        0.9 % sodium chloride infusion   IntraVENous PRN JULIETA Adams CNP           FAMILY HISTORY:  family history includes Cancer in her maternal grandfather and paternal grandmother; Depression in her mother; Diabetes in her maternal grandfather, maternal grandmother, and mother; Heart Disease in her father, maternal grandfather, and mother; High Blood Pressure in her maternal grandfather, maternal grandmother, and mother; Kidney Disease in her maternal grandmother; Other in her father and mother. SOCIAL HISTORY:   reports that she has never smoked. She has never used smokeless tobacco. She reports that she does not drink alcohol and does not use drugs. ________________________________________________________________________                                    VITALS:  Vitals:    10/31/22 1150 10/31/22 1200 10/31/22 1400 10/31/22 1500   BP: 130/82 132/79 132/63 105/74   Pulse: 68 69 77 73   Resp: 22 18 15 15   Temp: 97.9 °F (36.6 °C)  97.9 °F (36.6 °C) 98.8 °F (37.1 °C)   TempSrc:  Oral Oral Oral   SpO2: 99% 100% 100% 100%   Weight:       Height:                                                        INPUT/OUTPUT:  I/O this shift: In: 753.3 [Blood:753.3]  Out: -   In: 753.3 [Blood:753.3]  Out: -                                                                                                                    PHYSICAL EXAM:     General Appearance: Appears healthy. Alert; in no acute distress. Pleasant.   Skin: Skin color, texture, turgor normal. No rashes or lesions. Lymphatic: No abnormally enlarged lymph nodes.   Neck and EENT: normal atraumatic, no neck masses, normal thyroid  Respiratory: Nonlabored respirations, no conversational dyspnea  Cardiovascular: Regular rate, distal pulses intact, noncyanotic extremities  Abdomen: obese, soft, non-tender, non-distended, and no right upper quadrant tenderness, no rebound, guarding, or rigidity  Pelvic Exam:   Chaperone for Intimate Exam: Chaperone was present for entire exam   External genitalia: General appearance; normal, Hair distribution; normal, Lesions absent, blood present on the vulva  Urinary system: urethral meatus normal  Vaginal: normal mucosa, dark thin red blood  Cervix: Palpable firm, well circumscribed mass measuring approximately 6cm in diameter; cervical appearance distorted by palpable fibroid   Adnexa: normal adnexa in size, nontender and no masses  Uterus: large, bulky, firm uterus palpable above the pubic bone   Rectal Exam: deferred  Musculoskeletal: no gross abnormalities  Extremities: non-tender BLE and non-edematous  Psych:  mood and affect are within normal limits       LAB RESULTS:  Results for orders placed or performed during the hospital encounter of 10/31/22   Lipid Panel   Result Value Ref Range    Cholesterol 148 <200 mg/dL    HDL 48 >40 mg/dL    LDL Cholesterol 87 0 - 130 mg/dL    Chol/HDL Ratio 3.1 <5    Triglycerides 66 <150 mg/dL   Basic Metabolic Panel w/ Reflex to MG   Result Value Ref Range    Glucose 87 70 - 99 mg/dL    BUN 12 6 - 20 mg/dL    Creatinine 0.61 0.50 - 0.90 mg/dL    Est, Glom Filt Rate >60 >60 mL/min/1.73m2    Calcium 8.2 (L) 8.6 - 10.4 mg/dL    Sodium 132 (L) 135 - 144 mmol/L    Potassium 3.7 3.7 - 5.3 mmol/L    Chloride 103 98 - 107 mmol/L    CO2 22 20 - 31 mmol/L    Anion Gap 7 (L) 9 - 17 mmol/L   CBC   Result Value Ref Range    WBC 9.1 3.5 - 11.3 k/uL    RBC 2.72 (L) 3.95 - 5.11 m/uL    Hemoglobin 6.5 (LL) 11.9 - 15.1 g/dL    Hematocrit 21.8 (L) 36.3 - 47.1 %    MCV 80.1 (L) 82.6 - 102.9 fL    MCH 23.9 (L) 25.2 - 33.5 pg    MCHC 29.8 28.4 - 34.8 g/dL    RDW 13.7 11.8 - 14.4 %    Platelets 065 530 - 533 k/uL    MPV 10.3 8.1 - 13.5 fL    NRBC Automated 0.0 0.0 per 100 WBC   Hemoglobin A1C   Result Value Ref Range    Hemoglobin A1C 5.2 4.0 - 6.0 %    Estimated Avg Glucose 103 mg/dL   SPECIMEN REJECTION   Result Value Ref Range    Specimen Source . BLOOD     Ordered Test CBC GLYHGB     Reason for Rejection       Unable to perform testing: Results suspect due to history of previous lab results. Iron and TIBC   Result Value Ref Range    Iron 12 (L) 37 - 145 ug/dL    TIBC 354 250 - 450 ug/dL    Iron Saturation 3 (L) 20 - 55 %    UIBC 342 112 - 347 ug/dL   Ferritin   Result Value Ref Range    Ferritin 6 (L) 13 - 150 ng/mL   Hemoglobin and Hematocrit   Result Value Ref Range    Hemoglobin 6.8 (LL) 11.9 - 15.1 g/dL    Hematocrit 22.5 (L) 36.3 - 47.1 %   TYPE AND SCREEN   Result Value Ref Range    Expiration Date 11/03/2022,2359     Arm Band Number [id number]     ABO/Rh O POSITIVE     Antibody Screen NEGATIVE     Unit Number [id number]     Product Code Leukocyte Reduced Red Cell     Unit Divison 00     Dispense Status ISSUED     Unit Issue Date/Time [id number]     Product Code Blood Bank E1030E48     Blood Bank Unit Type and Rh O POS     Blood Bank ISBT Product Blood Type 5100     Blood Bank Blood Product Expiration Date 790596255860     Transfusion Status OK TO TRANSFUSE     Crossmatch Result COMPATIBLE        DIAGNOSTICS:  CT HEAD WO CONTRAST    Addendum Date: 10/30/2022    ADDENDUM: Results were reported to Dr. Carolina Edge via radiology results communication at 10:20 p.m. on October 30, 2022.      Result Date: 10/30/2022  EXAMINATION: CT OF THE HEAD WITHOUT CONTRAST  10/30/2022 10:07 pm TECHNIQUE: CT of the head was performed without the administration of intravenous contrast. Automated exposure control, iterative reconstruction, and/or weight based adjustment of the mA/kV was utilized to reduce the radiation dose to as low as reasonably achievable. COMPARISON: None. HISTORY: ORDERING SYSTEM PROVIDED HISTORY: right arm numbness TECHNOLOGIST PROVIDED HISTORY: right arm numbness Decision Support Exception - unselect if not a suspected or confirmed emergency medical condition->Emergency Medical Condition (MA) Is the patient pregnant?->No Reason for Exam: right arm numbness Additional signs and symptoms: numbness aphasia FINDINGS: BRAIN/VENTRICLES: There is no acute intracranial hemorrhage, mass effect or midline shift. No abnormal extra-axial fluid collection. The gray-white differentiation is maintained without evidence of an acute infarct. There is no evidence of hydrocephalus. ORBITS: The visualized portion of the orbits demonstrate no acute abnormality. SINUSES: The visualized paranasal sinuses and mastoid air cells demonstrate no acute abnormality. SOFT TISSUES/SKULL:  No acute abnormality of the visualized skull or soft tissues. No acute intracranial abnormality. CTA HEAD NECK W CONTRAST    Result Date: 10/30/2022  EXAMINATION: CTA OF THE HEAD AND NECK WITH CONTRAST 10/30/2022 10:10 pm: TECHNIQUE: CTA of the head and neck was performed with the administration of intravenous contrast. Multiplanar reformatted images are provided for review. MIP images are provided for review. Stenosis of the internal carotid arteries measured using NASCET criteria. Automated exposure control, iterative reconstruction, and/or weight based adjustment of the mA/kV was utilized to reduce the radiation dose to as low as reasonably achievable.  COMPARISON: Noncontrast CT head from earlier today HISTORY: ORDERING SYSTEM PROVIDED HISTORY: right arm numbenss TECHNOLOGIST PROVIDED HISTORY: right arm numbenss Decision Support Exception - unselect if not a suspected or confirmed emergency medical condition->Emergency Medical Condition (MA) Reason for Exam: right arm numbenss Additional signs and symptoms: Numbness; Aphasia FINDINGS: CTA NECK: AORTIC ARCH/ARCH VESSELS: No dissection or arterial injury. No significant stenosis of the brachiocephalic or subclavian arteries. CAROTID ARTERIES: No dissection, arterial injury, or hemodynamically significant stenosis by NASCET criteria. VERTEBRAL ARTERIES: No dissection, arterial injury, or significant stenosis. SOFT TISSUES: The lung apices are clear. No cervical or superior mediastinal lymphadenopathy. The larynx and pharynx are unremarkable. No acute abnormality of the salivary and thyroid glands. BONES: No acute osseous abnormality. CTA HEAD: ANTERIOR CIRCULATION: There is 50% stenosis in the cavernous segment of the right internal carotid artery with moderate calcified plaque. There is severe stenosis in the distal supraclinoid segment of the right internal carotid artery, with occlusion in the proximal M1 segment of the right MCA and at origin of A1 segment of the right NEREIDA, with prominent collateral vessels, 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 There is hypoplastic and M1 segment of the left MCA with collateral vessels, 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 No significant stenosis of the left intracranial internal carotid, and the remainder of the bilateral anterior cerebral arteries. No aneurysm. POSTERIOR CIRCULATION: No significant stenosis of the vertebral, basilar, or posterior cerebral arteries. No aneurysm. OTHER: No dural venous sinus thrombosis on this non-dedicated study. BRAIN: No mass effect or midline shift. No extra-axial fluid collection. The gray-white differentiation is maintained. Severe stenosis in the distal supraclinoid segment of the right internal carotid artery, with occlusion in the proximal M1 segment of the right MCA and at origin of A1 segment of the right NEREIDA, with prominent collateral vessels, 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 No acute abnormality or flow-limiting stenosis of the major arteries of the neck. Results were reported to Dr. Parul De La Cruz at 10:26 p.m. on 2022. ASSESSMENT & PLAN:    Bea Gregory is a 40 y.o. female  with Abnormal uterine bleeding 2/2 uterine fibroids    - Patient admitted due to R sided weakness and slurred speech, was newly diagnosed with Moyamoya disease   - 1 year history of heavy, irregular vaginal bleeding with passage of clots   - Pelvic US 22 showed a large, fibroid uterus with 2.5 mm endometrial stripe   - IUD previously in place, removed 10/7/22  - VSS, afebrile   - Hgb 7.3 on admission, decreased to 6.5 this morning  - S/p 1u pRBCs on 10/31/22   - Pelvic exam significant for an enlarged, firm, fibroid uterus. Lower uterine segment/cervical fibroid approximately 6cm in diameter palpable on bimanual exam   - Patient is a poor candidate for estrogen therapy due to her new medical diagnosis  - Discussed Lupron therapy with patient. Discussed that this is a medical therapy that will have significant menopausal side effects such as hot flashes and night sweats. Patient was agreeable to trying Lupron for acute management of her AUB 2/2 fibroids   - Can consider Aygestin 5mg qd for add back therapy to decrease Lupron side effects if patient experiences significant menopausal symptoms   - Recommend Toradol 15mg IV q6hr PRN for pain associated with uterine cramping as long as no contraindications with patient's current medical comorbidities   - Recommend resuming Plavix and ASA 81mg if needed for treatment of patient's Moyamoya disease   - Recommend transfusion to maintain Hgb 7.0   - Please feel free to [facility] Ob/Gyn team with any questions or concerns. Will continue to follow along at this time.       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     Priority: Medium    Numbness and tingling 10/31/2022     Priority: Medium    Abnormal uterine bleeding 10/31/2022     Priority: Medium     AUB-L      CLARISSA (iron deficiency anemia) 09/22/2022     Priority: Medium    Morbid obesity with BMI of 50.0-59.9, adult ([address] 06/09/2022     Priority: Medium    Atypical glandular cells on cervical Pap smear 04/06/2022 2022 - needs colp and EMB      S/P laparoscopic sleeve gastrectomy 03/07/2022    Esophageal dysphagia     Antral gastritis     IUD migration 07/02/2021    Vitamin D deficiency 06/02/2021    Essential hypertension 02/06/2019    Hyperlipidemia 05/08/2016    Pre-diabetes 05/08/2016       Plan discussed with Dr. Bria Do, who is agreeable.      Attending's Name: Dr. Gloria Jaramillo DO  Ob/Gyn Resident   [facility]  10/31/2022, 4:08 PM

## 2022-10-31 NOTE — VIRTUAL HEALTH
Consults  Patient Location:  [address] [place] [address] ED    Provider Location ([facility]): [place], [place]    This virtual visit was conducted via interactive/real-time audio/video. [facility] Stroke and Vascular Neurology Consult for  [facility] Stroke Alert through [address] @ 22:10  10/30/2022 10:59 PM  Pt Name: Marcelino Burgess  MRN: [id number]  YOB: 1985  Date of evaluation: 10/30/2022  Primary Care Physician: Eva Ayala DO  Reason for Evaluation: Stroke Evaluation with Discussion with Ed or primary team with Telemedicine and stroke evaluation with Review of imaging and labs    Marcelino Burgess is a 40 y.o. female who is morbidly obese, HTN, about 8:30pm suddenly c/o right side, slurred speech and right face droop. Her symptoms completely resolved in the ED and patient feels normal at the time I Saw her. This never happened before       LKW: 8:30pm  NIH:  0    Allergies  is allergic to vancomycin. Medications  Prior to Admission medications    Medication Sig Start Date End Date Taking?  Authorizing Provider   ondansetron (ZOFRAN) 4 MG tablet Take 1 tablet by mouth every 8 hours as needed for Nausea or Vomiting 9/22/22   Shamika Vignesh APRN - CNP   pantoprazole (PROTONIX) 40 MG tablet Take 1 tablet by mouth daily 9/22/22   Shamika Edgardoe, APRN - CNP   sucralfate (CARAFATE) 1 GM tablet Take 1 tablet by mouth 4 times daily 9/22/22   Shamika Edgardoe, APRN - CNP   ferrous sulfate (IRON 325) 325 (65 Fe) MG tablet Take 1 tablet by mouth daily (with breakfast) 9/13/22   Shamika Spire, APRN - NUSRAT   metoprolol succinate (TOPROL XL) 100 MG extended release tablet TAKE ONE TABLET BY MOUTH DAILY 8/1/22   Malini Rodriguez DO   vitamin D (ERGOCALCIFEROL) 1.25 MG (86106 UT) CAPS capsule Take 1 capsule by mouth once a week for 8 doses 6/8/22 7/28/22  Shamika Spire APRN - CNP   Multiple Vitamins-Minerals (THERAPEUTIC MULTIVITAMIN-MINERALS) tablet Take 1 tablet by mouth in the morning and at bedtime MVI from [address]  Provider, MD   calcium carbonate (OSCAL) 500 MG TABS tablet Take 500 mg by mouth daily Bariatric fusion    Historical Provider, MD   promethazine (PHENERGAN) 25 MG tablet Take 1 tablet by mouth every 6 hours as needed for Nausea 3/8/22   Vinay Cabrera MD    Scheduled Meds:   sodium chloride  80 mL IntraVENous Once    sodium chloride flush  10 mL IntraVENous BID    aspirin  81 mg Oral Once    clopidogrel  300 mg Oral Once    levonorgestrel  1 each IntraUTERine Once    levonorgestrel  1 each IntraUTERine Once     Continuous Infusions:  PRN Meds:.  Past Medical History   has a past medical history of Class 3 severe obesity due to excess calories without serious comorbidity with body mass index (BMI) of 60.0 to 69.9 in adult [facility]), Essential hypertension, Hyperlipidemia, Hyperlipidemia, Pneumonia due to COVID-19 virus, Prediabetes, and Under care of team.  Social History  Social History     Socioeconomic History    Marital status: Single     Spouse name: Not on file    Number of children: 1    Years of education: Not on file    Highest education level: Not on file   Occupational History    Not on file   Tobacco Use    Smoking status: Never    Smokeless tobacco: Never   Vaping Use    Vaping Use: Never used   Substance and Sexual Activity    Alcohol use: No    Drug use: No    Sexual activity: Not Currently   Other Topics Concern    Not on file   Social History Narrative    ** Merged History Encounter **          Social Determinants of Health     Financial Resource Strain: Low Risk     Difficulty of Paying Living Expenses: Not hard at all   Food Insecurity: No Food Insecurity    Worried About Running Out of Food in the Last Year: Never true    Ran Out of Food in the Last Year: Never true   Transportation Needs: Not on file   Physical Activity: Not on file   Stress: Not on file   Social Connections: Not on file   Intimate Partner Violence: Not on file   Housing Stability: Not on file     Family History      Problem Relation Age of Onset    Heart Disease Mother     Other Mother     Heart Disease Father     Other Father     Depression Mother     Diabetes Mother     High Blood Pressure Mother     Diabetes Maternal Grandmother     High Blood Pressure Maternal Grandmother     Kidney Disease Maternal Grandmother     Cancer Maternal Grandfather         prostate    Diabetes Maternal Grandfather     Heart Disease Maternal Grandfather     High Blood Pressure Maternal Grandfather     Cancer Paternal Grandmother         breast and brain       OBJECTIVE  BP (!) 151/92   Pulse 69   Temp 98.4 °F (36.9 °C) (Oral)   Resp 19   Ht 5' 3\" (1.6 m)   Wt 287 lb (130.2 kg)   SpO2 100%   BMI 50.84 kg/m²     NIH Stroke Scale  Interval: Baseline  Level of Consciousness (1a): Alert  LOC Questions (1b): Answers both correctly  LOC Commands (1c): Performs both tasks correctly  Best Gaze (2): Normal  Visual (3): No visual loss  Facial Palsy (4): Normal symmetrical movement  Motor Arm, Left (5a): No drift  Motor Arm, Right (5b): No drift (numbness and weakness in the right upper extremity)  Motor Leg, Left (6a): No drift (numbness in the right lower extremity)  Motor Leg, Right (6b): No drift (reports numbness and weaness in the lower extremity)  Limb Ataxia (7): Absent  Sensory (8): Normal  Best Language (9): No aphasia  Dysarthria (10): Normal  Extinction and Inattention (11): No abnormality  Total: 0  Pre-Morbid mRS: 0    Imaging:  Images were personally reviewed with VIDHI SAINZ used to review images including:  CT brain without contrast: nothing acute  CTA imaging: moyamoya vasculopathy, right worse than left, right terminus ICA occlusion with moyamoya vessel. L M1 stenosis? With moyamoya vessels    Assessment    40year old woman with morbid obesity, now with transient right hemiparesis found to have left worse than right moyamoya vasculopathy on the CTA.     Recommendations:  NIH 0  Recommend Inpatient Neurology Consult for further assessment and evaluation   consider . [id number]  Not a tPA candidate due to resolution of symptoms  Load aspirin 325mg x 1 dose follow by 81mg daily  Load plavix 300mg x 1 dose follow by 75mg daily  Lipitor 80mg daily  Transfer to [facility] neuro ICU for closer monitoring, q1hr neurocheck to see if symptoms comes back  -180  MRI brain w/o contrast  Possible DSA to look at the moyamoya disease        Discussed with ED Physician Dr Joyce Bahena    At least 39 min of Telemedicine and time in conversation directly with ED staff and physician for the patient who is in imminent and life threatening deterioration without further treatment and evaluation. This Virtual Visit was conducted with patient's (and/or legal guardian's) consent, to provide telestroke consultation and necessary medical care.   Time spent examining patient, reviewing the images personally, reviewing the chart, perform high complexity decision making and speaking with the nursing staff regarding recommendations        Layton Walter MD,  Stroke, Neurocritical Care And/or [address] [place] [address] Stroke [address]  Electronically signed 10/30/2022 at 10:59 PM

## 2022-10-31 NOTE — ED PROVIDER NOTES
EMERGENCY DEPARTMENT ENCOUNTER   ATTENDING ATTESTATION     Pt Name: Starla Matias  MRN: [id number]  [place] 1985  Date of evaluation: 10/30/22       Starla Matias is a 40 y.o. female who presents with Numbness (/) and Aphasia      MDM: 69-year-old female presents for complaints of reported aphasia and right arm numbness. On initial exam patient in no acute distress vitals are stable last known well was 8 PM, initial NIH of 1, stroke alert was activated, patient was taken to CT    Discussed with neuro intervention Dr. 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 Nadya Sotomayor will be accepting physician    Vital signs stable at time of transfer      Vitals:   Vitals:    10/30/22 2330 10/30/22 2345 10/31/22 0050 10/31/22 0100   BP: 135/80 (!) 143/100 128/84 136/77   Pulse: 66 71 77 72   Resp: 21 17 19 17   Temp:    97.5 °F (36.4 °C)   TempSrc:       SpO2: 100% 100% 100%    Weight:       Height:             I personally saw and examined the patient. I have reviewed and agree with the resident's findings, including all diagnostic interpretations and treatment plan as written. I was present for the key portions of any procedures performed and the inclusive time noted for any critical care statement. The care is provided during an unprecedented national emergency due to the novel coronavirus, COVID 19.   Raliegh Brunner, DO  Attending Emergency Physician           Raliegh Brunner, DO  10/31/22 Blanca Potter [address]

## 2022-10-31 NOTE — PROGRESS NOTES
Pt vomited small amount undigested food. Zofran given. Fluids continue at 500 ml/hr. Sydney care done noted large number of clots and bright red blood to sydney area.

## 2022-10-31 NOTE — ED NOTES
Transported patient to ct scan via stretcher with portable cardiac monitor.      Salina Hooks RN  78/92/02 1411 [FreeTextEntry1] : Documented by Chanell Tejeda acting as a scribe for Dr. Lawler on 10/29/2020 .\par \par All medical record entries made by the Scribe were at my, Dr. Lawler, direction and personally dictated by me on 10/29/2020 . I have reviewed the chart and agree that the record accurately reflects my personal performance of the history, physical exam, assessment and plan. I have also personally directed, reviewed, and agree with the discharge instructions.\par

## 2022-10-31 NOTE — PROGRESS NOTES
Occupational [facility] [employer]  Occupational Therapy Not Seen Note    DATE: 10/31/2022    NAME: Raudel De Leon  MRN: [id number]   : 1985      Patient not seen this date for Occupational Therapy due to:    Patient independent with ADLs and functional tasks with no acute OT needs. Will defer OT evaluation at this time. Please reorder OT if future needs arise.      Next Scheduled Treatment: N/A    Electronically signed by Muriel Meek OT on 10/31/2022 at 8:36 AM

## 2022-10-31 NOTE — PROGRESS NOTES
Obstetric/Gynecology Resident Interval Note    Patient has newly diagnosed Haider Vega disease and heavy vaginal bleeding due to known uterine fibroids. Will avoid estrogen therapy for this patient. Ordered Lupron for management of her bleeding and fibroids, however pharmacy does not have inpatient medication. Pharmacy will attempt to obtain this. For now, will start progestin therapy with aygestin 5mg TID for treatment of her heavy vaginal bleeding.     Magan Marmolejo DO  OB/GYN Resident, [facility]  10/31/2022, 7:29 PM

## 2022-10-31 NOTE — H&P
Neuro ICU History & Physical    Patient Name: Sonia Harris  Patient : 1985  Room/Bed: 0126/0126-01  Code Status: Full Code  Allergies: Allergies   Allergen Reactions    Vancomycin Itching     Patient felt like skin was on fire. CHIEF COMPLAINT     R Arm Weakness/Numbness    HPI    History Obtained From: Patient, EMR    The patient is a 40 y.o. female w/ PMHx significant for hypertension, hyperlipidemia, diabetes who presented to the ER with complaint of right-sided arm numbness as well as slurred speech and right facial droop. Patient states that she was cooking with her fiancé approximately 2 hours ago when he noticed that she was slurring her speech and had a right-sided facial droop. Patient was also complaining of numbness in her arm. At time of initial evaluation in the ER, she stated that her right-sided facial droop and slurred speech had improved she still was having consistent right arm numbness. Denied any fever or chills or blood thinner use. Does have a strong family history of acute CVA. Denies any chest pain, shortness of breath, neck stiffness, blurry vision, double vision, difficulty swallowing, bowel pain, nausea vomiting or diarrhea. CTH negative for any intracranial abnormalities. 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 Given ASA and Plavix in the ER. Transferred to [facility] neuro ICU for further monitoring and workup. At time of initial examination, patient was still asymptomatic. AAOx3, GCS 15. No complaints at this time.     Admitted to ICU From: [facility]   Reason for ICU Admission: Stroke, Neuromonitoring PATIENT HISTORY   Past Medical History:        Diagnosis Date    Class 3 severe obesity due to excess calories without serious comorbidity with body mass index (BMI) of 60.0 to 69.9 in adult ([facility] [address] 2019    Essential hypertension 2019    managed by Dr. Thai Lennon     Hyperlipidemia 2016    Hyperlipidemia     Pneumonia due to COVID-19 virus 2021    SOB, fatigue, cough, diarrhea, dizziness x 1 week; no hospitalization    Prediabetes     Under care of team 2022    PCP: Lul Gunderson/Macho, last visit-patient goes 2022 for clearance       Past Surgical History:        Procedure Laterality Date     SECTION      INTRAUTERINE DEVICE INSERTION  2021    [facility] 61459-143-23 Lot [id number] Exp 2023    SLEEVE GASTRECTOMY  2022    ROBOTIC LAPAROSCOPIC GASTRIC SLEEVE, EGD    SLEEVE GASTRECTOMY N/A 3/7/2022    XI ROBOTIC LAPAROSCOPIC GASTRIC SLEEVE, EGD performed by Jose Griggs DO at [address] [facility] N/A 2021    EGD BIOPSY performed by Jose Griggs DO at [address] History:   Social History     Socioeconomic History    Marital status: Single     Spouse name: Not on file    Number of children: 1    Years of education: Not on file    Highest education level: Not on file   Occupational History    Not on file   Tobacco Use    Smoking status: Never    Smokeless tobacco: Never   Vaping Use    Vaping Use: Never used   Substance and Sexual Activity    Alcohol use: No    Drug use: No    Sexual activity: Not Currently   Other Topics Concern    Not on file   Social History Narrative    ** Merged History Encounter **          Social Determinants of Health     Financial Resource Strain: Low Risk     Difficulty of Paying Living Expenses: Not hard at all   Food Insecurity: No Food Insecurity    Worried About [address] in the Last Year: Never true    [address] [facility] [address] in the Last Year: Never true   Transportation Needs: Not on file   Physical Activity: Not on file   Stress: Not on file   Social Connections: Not on file   Intimate Partner Violence: Not on file   Housing Stability: Not on file       Family History:       Problem Relation Age of Onset    Heart Disease Mother     Other Mother     Heart Disease Father     Other Father     Depression Mother     Diabetes Mother     High Blood Pressure Mother     Diabetes Maternal Grandmother     High Blood Pressure Maternal Grandmother     Kidney Disease Maternal Grandmother     Cancer Maternal Grandfather         prostate    Diabetes Maternal Grandfather     Heart Disease Maternal Grandfather     High Blood Pressure Maternal Grandfather     Cancer Paternal Grandmother         breast and brain       Allergies:    Vancomycin    Medications Prior to Admission:    Medications Prior to Admission: ondansetron (ZOFRAN) 4 MG tablet, Take 1 tablet by mouth every 8 hours as needed for Nausea or Vomiting  pantoprazole (PROTONIX) 40 MG tablet, Take 1 tablet by mouth daily  sucralfate (CARAFATE) 1 GM tablet, Take 1 tablet by mouth 4 times daily  ferrous sulfate (IRON 325) 325 (65 Fe) MG tablet, Take 1 tablet by mouth daily (with breakfast)  metoprolol succinate (TOPROL XL) 100 MG extended release tablet, TAKE ONE TABLET BY MOUTH DAILY  vitamin D (ERGOCALCIFEROL) 1.25 MG (09731 UT) CAPS capsule, Take 1 capsule by mouth once a week for 8 doses  Multiple Vitamins-Minerals (THERAPEUTIC MULTIVITAMIN-MINERALS) tablet, Take 1 tablet by mouth in the morning and at bedtime MVI from [facility]  calcium carbonate (OSCAL) 500 MG TABS tablet, Take 500 mg by mouth daily Bariatric fusion  promethazine (PHENERGAN) 25 MG tablet, Take 1 tablet by mouth every 6 hours as needed for Nausea    Current Medications:  Current Facility-Administered Medications: ondansetron (ZOFRAN-ODT) disintegrating tablet 4 mg, 4 mg, Oral, Q8H PRN **OR** ondansetron (ZOFRAN) injection 4 mg, 4 mg, IntraVENous, Q6H PRN  polyethylene glycol (GLYCOLAX) packet 17 g, 17 g, Oral, Daily PRN  0.9 % sodium chloride infusion, , IntraVENous, Continuous  atorvastatin (LIPITOR) tablet 80 mg, 80 mg, Oral, Nightly  clopidogrel (PLAVIX) tablet 75 mg, 75 mg, Oral, Daily  aspirin EC tablet 81 mg, 81 mg, Oral, Daily    REVIEW OF SYSTEMS     CONSTITUTIONAL: negative for fatigue   EYES: negative for double vision, blurry vision, and photophobia    HEENT: negative for tinnitus and sore throat   RESPIRATORY: negative for cough, shortness of breath   CARDIOVASCULAR: negative for chest pain, palpitations, or syncope   GASTROINTESTINAL: negative for abdominal pain, nausea, vomiting   GENITOURINARY: negative for incontinence or retention    MUSCULOSKELETAL: negative for neck or back pain, negative for extremity pain   NEUROLOGICAL: Negative for seizures, headaches, weakness, numbness, confusion, aphasia, dysarthria    PSYCHIATRIC: negative for agitation, hallucination, SI/HI   SKIN Negative for spontaneous contusions, rashes, or lesions      PHYSICAL EXAM:     /71   Pulse 72   Temp 98.2 °F (36.8 °C) (Oral)   Resp 18   Ht 5' 3\" (1.6 m)   Wt 286 lb 6 oz (129.9 kg)   SpO2 100%   BMI 50.73 kg/m²     PHYSICAL EXAM:  CONSTITUTIONAL:  Well developed, well nourished, alert and oriented x 3, in no acute distress. GCS 15. Nontoxic. No dysarthria. No aphasia. HEAD:  normocephalic, atraumatic    EYES:  PERRLA, EOMI.  Visual acuity and peripheral vision intact b/l   ENT:  moist mucous membranes   NECK:  supple, symmetric   LUNGS:  Equal air entry bilaterally   CARDIOVASCULAR:  normal s1 / s2, RRR, distal pulses intact   ABDOMEN:  Soft, no rigidity   NEUROLOGIC:  Mental Status:  A & O x3,awake             Cranial Nerves:    II: Visual acuity:  normal  II: Visual fields:  normal  III: Pupils:  equal, round, reactive to light  III,IV,VI: Extra Ocular Movements: intact  V: Facial sensation:  intact  VII: Facial strength: intact  VIII: Hearing:  intact  IX: Palate:  intact  XI: Shoulder shrug:  intact  XII: Tongue movement:  normal    Motor Exam:    Drift:  absent  Tone:  normal    MOTOR:  RUE: 5/5  LUE: 5/5  RLE: 5/5  LLE: 5/5    Sensory:    Touch:    Right Upper Extremity:  normal  Left Upper Extremity:  normal  Right Lower Extremity:  normal  Left Lower Extremity:  normal    Plantar Response:  Right:  downgoing  Left:  downgoing    Clonus:  absent    Coordination/Dysmetria:  Heel to Shin:  Right:  normal  Left:  normal  Finger to Nose:   Right:  normal  Left:  normal        NIH Stroke Scale Total (if not done complete detailed one below):    1a.  Level of consciousness:  0 - alert; keenly responsive  1b. Level of consciousness questions:  0 - answers both questions correctly  1c. Level of consciousness questions:  0 - performs both tasks correctly  2. Best Gaze:  0 - normal  3. Visual:  0 - no visual loss  4. Facial Palsy:  0 - normal symmetric movement  5a. Motor left arm:  0 - no drift, limb holds 90 (or 45) degrees for full 10 seconds  5b. Motor right arm:  0 - no drift, limb holds 90 (or 45) degrees for full 10 seconds  6a. Motor left le - no drift; leg holds 30 degree position for full 5 seconds  6b. Motor right le - no drift; leg holds 30 degree position for full 5 seconds  7. Limb Ataxia:  0 - absent  8. Sensory:  0 - normal; no sensory loss  9. Best Language:  0 - no aphasia, normal  10. Dysarthria:  0 - normal  11.   Extinction and Inattention:  0 - no abnormality    LABS AND IMAGING:     RECENT LABS:  CBC with Differential:    Lab Results   Component Value Date/Time    WBC 10.3 10/30/2022 10:00 PM    RBC 2.98 10/30/2022 10:00 PM    HGB 7.2 10/30/2022 10:00 PM    HCT 22.0 10/30/2022 10:00 PM     10/30/2022 10:00 PM    MCV 73.8 10/30/2022 10:00 PM    MCH 24.0 10/30/2022 10:00 PM    MCHC 32.6 10/30/2022 10:00 PM    RDW 14.8 10/30/2022 10:00 PM    LYMPHOPCT 32 10/30/2022 10:00 PM MONOPCT 9 10/30/2022 10:00 PM    BASOPCT 1 10/30/2022 10:00 PM    MONOSABS 1.00 10/30/2022 10:00 PM    LYMPHSABS 3.30 10/30/2022 10:00 PM    EOSABS 0.20 10/30/2022 10:00 PM    BASOSABS 0.10 10/30/2022 10:00 PM    DIFFTYPE NOT REPORTED 05/23/2021 10:51 AM     BMP:    Lab Results   Component Value Date/Time    NA ADDED ON 10/30/2022 10:00 PM    K ADDED ON 10/30/2022 10:00 PM    CL ADDED ON 10/30/2022 10:00 PM    CO2 ADDED ON 10/30/2022 10:00 PM    BUN ADDED ON 10/30/2022 10:00 PM    LABALBU 3.8 10/30/2022 09:58 PM    CREATININE ADDED ON 10/30/2022 10:00 PM    CALCIUM ADDED ON 10/30/2022 10:00 PM    GFRAA >60 09/12/2022 01:31 PM    LABGLOM ADDED ON 10/30/2022 10:00 PM    GLUCOSE ADDED ON 10/30/2022 10:00 PM       RADIOLOGY:  MRI brain without contrast    (Results Pending)     Labs and Images reviewed with:    [] Alison Quinones MD    [] Mariaelena Steel MD  [x] Beau Manuel MD  --[] there are no new interval images to review. ASSESSMENT AND PLAN:         ASSESSMENT:     This is a 40 y.o. female with R arm numbness/weakness, now resolved, found to have signs of wild-wild on CTA    Patient care will be discussed with attending, will reevaluate patient along with attending. PLAN/MEDICAL DECISION MAKING:    NEUROLOGIC:  - Imaging   - CTH w/o any acute abnormality   - CTA severe stenosis in the distal supraclinoid segment of the right internal  carotid artery, with occlusion in the proximal M1 segment of the right MCA and at origin of A1 segment of the right NEREIDA, with prominent collateral vessels, 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  - loaded with ASA, Plavix in ED  - cont Plavix 75mg daily, ASA 81mg daily  - MRI w/o contrast in AM  - AEDs not indicated  - Goal -180  - q1hr neuro checks    CARDIOVASCULAR:  BP Range: Systolic (19JML), WWF:759 , Min:121 , ZSP:412     Diastolic (58FMJ), BJC:62, Min:69, Max:107    Pulse Range: Pulse  Av.4  Min: 66  Max: 77  - Goal -180  - Cont home metoprolol   - f/u lipid panel, A1c  - Lipitor 80mg daily    PULMONARY:  Respiration Range: Resp  Av  Min: 13  Max: 27  Current Pulse Ox: SpO2: 100 %  24HR Pulse Ox Range: SpO2  Av.7 %  Min: 98 %  Max: 100 %  - No acute respiratory concerns at this time    RENAL/FLUID/ELECTROLYTE:  - BUN 14/ Creatinine 0.77  - I/O: No intake/output data recorded.   - IVF: 75ml/hr while NPO    GI/NUTRITION:  NUTRITION:  Diet NPO Exceptions are: Sips of Water with Meds  - Bowel regimen: glycolax, Zofran PRN  - GI prophylaxis: cont home PPI    ID:  Tmax: Temp (24hrs), Av.9 °F (36.6 °C), Min:97.5 °F (36.4 °C), Max:98.4 °F (36.9 °C)    Temperature Range: Temp: 98.2 °F (36.8 °C) Temp  Av.9 °F (36.6 °C)  Min: 97.5 °F (36.4 °C)  Max: 98.4 °F (36.9 °C)  - WBC   Lab Results   Component Value Date    WBC 10.3 10/30/2022   - Antimicrobials: not indicated at this time    HEME:   Recent Labs     10/30/22  2200   HGB 7.2*   - Down from 9.7 on  --> f/u AM HgB  - Platelets 322    ENDOCRINE:  - Continue to monitor blood glucose, goal <180  - glucose controlled - most recent BGL is   Recent Labs     10/30/22  2158 10/30/22  2200   GLUCOSE 94 ADDED ON       OTHER:  - PT/OT  - Code Status: Full Code    PROPHYLAXIS:  Stress ulcer: continue home PPI    DVT PROPHYLAXIS:  - SCD sleeves - Thigh High   - hold chem PPX for now      Yolande Castillo, DO  Neuro Critical Care Service   Pager [phone]  10/31/2022     3:48 AM

## 2022-10-31 NOTE — PROGRESS NOTES
Physical Therapy        Physical Therapy Cancel Note      DATE: 10/31/2022    NAME: Boom Fry  MRN: [id number]   : 1985      Patient not seen this date for Physical Therapy due to:    Patient independent with functional mobility. Will defer PT evaluation at this time. Please reorder PT if future needs arise. Pt notes baseline mobility, denies any safety mobility concerns and requests to defer physical therapy.          Electronically signed by Laura Zimmerman PT on 08/55/7734 at 1:46 PM

## 2022-10-31 NOTE — PROGRESS NOTES
[address] [facility] [address]  PROGRESS NOTE    Shift date: 10/31/2022   Shift day: Monday   Shift # 1    Room # 0126/0126-01   Name: Brina Painting                Pentecostal: Non-Yazidi   Place of Taoist: [employer]    Referral: Routine Visit    Admit Date & Time: 10/31/2022  3:34 AM    Assessment:  Brina Painting is a 40 y.o. female in the hospital because of \"stroke like symptoms\". Upon entering the room writer observes pt sitting in chair. She appeared to be calm and in good spirits. Pt engaged well in conversation. Intervention:  Writer introduced self and title as  Writer offered space for patient  to express feelings, needs, and concerns and provided a ministry presence.  assured her of prayers. Outcome:  Patient expressed appreciation for visit. Plan:  Chaplains will remain available to offer spiritual and emotional support as needed.       Electronically signed by Shannon Anderson on 10/31/2022 at 10:00 AM.  [facility]  [phone]        10/31/22 5404   Encounter Summary   Service Provided For: Patient   Referral/Consult From: Rounding   Support System Significant other   Last Encounter  10/31/22   Complexity of Encounter Low   Begin Time 0925   End Time  0932   Total Time Calculated 7 min   Encounter    Type Initial Screen/Assessment   Assessment/Intervention/Outcome   Assessment Calm   Intervention Active listening;Explored/Affirmed feelings, thoughts, concerns;Prayer (assurance of)/[place]   Outcome Engaged in conversation;Expressed feelings, needs, and concerns;Expressed Gratitude;Receptive

## 2022-10-31 NOTE — CONSULTS
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 Patient also had some headache developed after onset of symptoms. Symptoms resolved in 3-4 hrs. CTA head and neck showed b/l ICA intracranial severe stenosis with Randolm Ally like vasculopathy, therefore endovascular team was consulted. Patient denied any headache, weakness or numbness today. Allergies  is allergic to vancomycin. Medications  Prior to Admission medications    Medication Sig Start Date End Date Taking?  Authorizing Provider   ondansetron (ZOFRAN) 4 MG tablet Take 1 tablet by mouth every 8 hours as needed for Nausea or Vomiting 9/22/22   JULIETA Wilcox CNP   pantoprazole (PROTONIX) 40 MG tablet Take 1 tablet by mouth daily 9/22/22   JULIETA Wilcox CNP   sucralfate (CARAFATE) 1 GM tablet Take 1 tablet by mouth 4 times daily 9/22/22   JULIETA Wilcox CNP   ferrous sulfate (IRON 325) 325 (65 Fe) MG tablet Take 1 tablet by mouth daily (with breakfast) 9/13/22   JULIETA Wilcox CNP   metoprolol succinate (TOPROL XL) 100 MG extended release tablet TAKE ONE TABLET BY MOUTH DAILY 8/1/22   Malini Rodriguez DO   vitamin D (ERGOCALCIFEROL) 1.25 MG (28874 UT) CAPS capsule Take 1 capsule by mouth once a week for 8 doses 6/8/22 7/28/22  JULIETA Wilcox CNP   Multiple Vitamins-Minerals (THERAPEUTIC MULTIVITAMIN-MINERALS) tablet Take 1 tablet by mouth in the morning and at bedtime MVI from [address] Provider, MD   calcium carbonate (OSCAL) 500 MG TABS tablet Take 500 mg by mouth daily Bariatric fusion    Historical Provider, MD   promethazine (PHENERGAN) 25 MG tablet Take 1 tablet by mouth every 6 hours as needed for Nausea 3/8/22   Cleopatra Vaz, MD    Scheduled Meds:   atorvastatin  80 mg Oral Nightly    clopidogrel  75 mg Oral Daily    aspirin  81 mg Oral Daily    [Held by provider] metoprolol succinate  100 mg Oral Daily    pantoprazole  40 mg Oral QAM AC    sennosides-docusate sodium  2 tablet Oral Daily    iron sucrose  500 mg IntraVENous Q24H    [START ON 11/3/2022] ferrous sulfate  325 mg Oral BID WC     Continuous Infusions:   sodium chloride 75 mL/hr at 10/31/22 0519    sodium chloride       PRN Meds:.ondansetron **OR** ondansetron, polyethylene glycol, sodium chloride  Past Medical History   has a past medical history of Class 3 severe obesity due to excess calories without serious comorbidity with body mass index (BMI) of 60.0 to 69.9 in [facility]), Essential hypertension, Hyperlipidemia, Hyperlipidemia, Pneumonia due to COVID-19 virus, Prediabetes, and Under care of team.  Past Surgical History   has a past surgical history that includes  section; intrauterine device insertion (2021); Upper gastrointestinal endoscopy (N/A, 2021); Clay tooth extraction; Sleeve Gastrectomy (2022); and Sleeve Gastrectomy (N/A, 3/7/2022). Social History   reports that she has never smoked. She has never used smokeless tobacco.   reports no history of alcohol use. reports no history of drug use. Family History  family history includes Cancer in her maternal grandfather and paternal grandmother; Depression in her mother; Diabetes in her maternal grandfather, maternal grandmother, and mother; Heart Disease in her father, maternal grandfather, and mother; High Blood Pressure in her maternal grandfather, maternal grandmother, and mother; Kidney Disease in her maternal grandmother; Other in her father and mother.     Review of Systems:  CONSTITUTIONAL:  negative for fevers, chills, fatigue and malaise    EYES:  negative for double vision, blurred vision and photophobia     HEENT:  negative for tinnitus, epistaxis and sore throat RESPIRATORY:  negative for cough, shortness of breath, wheezing    CARDIOVASCULAR:  negative for chest pain, palpitations, syncope, edema    GASTROINTESTINAL:  negative for nausea, vomiting    GENITOURINARY:  negative for incontinence    MUSCULOSKELETAL:  negative for neck or back pain    NEUROLOGICAL:  Negative for weakness and tingling  negative for headaches and dizziness    PSYCHIATRIC:  negative for anxiety      Review of systems otherwise negative. OBJECTIVE:     Vitals:    10/31/22 1400   BP: 132/63   Pulse: 77   Resp: 15   Temp: 97.9 °F (36.6 °C)   SpO2: 100%        General:  Gen: normal habitus, NAD  HEENT: NCAT, mucosa moist  Cvs: RRR, S1 S2 normal  Resp: symmetric unlabored breathing  Abd: s/nd/nt  Ext: no edema  Skin: no lesions seen, warm and dry    Neuro:  Gen: awake and alert, oriented x3. Lang/speech: no aphasia or dysarthria. Follows commands. CN: PERRL, EOMI, VFF, V1-3 intact, face symmetric, hearing intact, shoulder shrug symmetric, tongue midline  Motor: grossly 5/5 UE and LE b/l  Sense: LT intact in all 4 ext. Coord: FTN and HTS intact b/l  DTR: deferred  Gait: narrow base gait    NIH Stroke Scale:   1a  Level of consciousness: 0 - alert; keenly responsive   1b. LOC questions:  0 - answers both questions correctly   1c. LOC commands: 0 - performs both tasks correctly   2. Best Gaze: 0 - normal   3. Visual: 0 - no visual loss   4. Facial Palsy: 0 - normal symmetric movement   5a. Motor left arm: 0 - no drift, limb holds 90 (or 45) degrees for full 10 seconds   5b. Motor right arm: 0 - no drift, limb holds 90 (or 45) degrees for full 10 seconds   6a. Motor left le - no drift; leg holds 30 degree position for full 5 seconds   6b  Motor right le - no drift; leg holds 30 degree position for full 5 seconds   7. Limb Ataxia: 0 - absent   8. Sensory: 0 - normal; no sensory loss   9. Best Language:  0 - no aphasia, normal   10. Dysarthria: 0 - normal   11.  Extinction and blood transfusion per ICU team   --Management per ICU team     Case discussed with Dr. Gely Vázquez attending.     Alvina Elkins MD    Stroke, [facility]  [id number]  Electronically signed 10/31/2022 at 2:44 PM

## 2022-11-01 LAB
ABSOLUTE EOS #: 0.05 K/UL (ref 0–0.44)
ABSOLUTE EOS #: <0.03 K/UL (ref 0–0.44)
ABSOLUTE IMMATURE GRANULOCYTE: 0.06 K/UL (ref 0–0.3)
ABSOLUTE IMMATURE GRANULOCYTE: 0.07 K/UL (ref 0–0.3)
ABSOLUTE LYMPH #: 2.5 K/UL (ref 1.1–3.7)
ABSOLUTE LYMPH #: 4.75 K/UL (ref 1.1–3.7)
ABSOLUTE MONO #: 0.94 K/UL (ref 0.1–1.2)
ABSOLUTE MONO #: 1.31 K/UL (ref 0.1–1.2)
ALBUMIN SERPL-MCNC: 2.9 G/DL (ref 3.5–5.2)
ALBUMIN/GLOBULIN RATIO: 1.2 (ref 1–2.5)
ALP BLD-CCNC: 42 U/L (ref 35–104)
ALT SERPL-CCNC: 8 U/L (ref 5–33)
ANION GAP SERPL CALCULATED.3IONS-SCNC: 10 MMOL/L (ref 9–17)
ANION GAP SERPL CALCULATED.3IONS-SCNC: 8 MMOL/L (ref 9–17)
ANION GAP SERPL CALCULATED.3IONS-SCNC: 8 MMOL/L (ref 9–17)
AST SERPL-CCNC: 18 U/L
BASOPHILS # BLD: 0 % (ref 0–2)
BASOPHILS # BLD: 0 % (ref 0–2)
BASOPHILS ABSOLUTE: 0.03 K/UL (ref 0–0.2)
BASOPHILS ABSOLUTE: 0.05 K/UL (ref 0–0.2)
BILIRUB SERPL-MCNC: 0.4 MG/DL (ref 0.3–1.2)
BUN BLDV-MCNC: 11 MG/DL (ref 6–20)
BUN BLDV-MCNC: 11 MG/DL (ref 6–20)
BUN BLDV-MCNC: 9 MG/DL (ref 6–20)
CALCIUM IONIZED: 1.1 MMOL/L (ref 1.13–1.33)
CALCIUM SERPL-MCNC: 7.8 MG/DL (ref 8.6–10.4)
CALCIUM SERPL-MCNC: 8 MG/DL (ref 8.6–10.4)
CALCIUM SERPL-MCNC: 8.1 MG/DL (ref 8.6–10.4)
CHLORIDE BLD-SCNC: 105 MMOL/L (ref 98–107)
CHLORIDE BLD-SCNC: 108 MMOL/L (ref 98–107)
CHLORIDE BLD-SCNC: 112 MMOL/L (ref 98–107)
CO2: 18 MMOL/L (ref 20–31)
CO2: 20 MMOL/L (ref 20–31)
CO2: 20 MMOL/L (ref 20–31)
CREAT SERPL-MCNC: 0.52 MG/DL (ref 0.5–0.9)
CREAT SERPL-MCNC: 0.53 MG/DL (ref 0.5–0.9)
CREAT SERPL-MCNC: 0.53 MG/DL (ref 0.5–0.9)
EKG ATRIAL RATE: 73 BPM
EKG P-R INTERVAL: 122 MS
EKG Q-T INTERVAL: 424 MS
EKG QRS DURATION: 86 MS
EKG QTC CALCULATION (BAZETT): 467 MS
EKG R AXIS: 47 DEGREES
EKG T AXIS: 30 DEGREES
EKG VENTRICULAR RATE: 73 BPM
EOSINOPHILS RELATIVE PERCENT: 0 % (ref 1–4)
EOSINOPHILS RELATIVE PERCENT: 0 % (ref 1–4)
GFR SERPL CREATININE-BSD FRML MDRD: >60 ML/MIN/1.73M2
GLUCOSE BLD-MCNC: 110 MG/DL (ref 65–105)
GLUCOSE BLD-MCNC: 125 MG/DL (ref 70–99)
GLUCOSE BLD-MCNC: 85 MG/DL (ref 70–99)
GLUCOSE BLD-MCNC: 89 MG/DL (ref 70–99)
HCT VFR BLD CALC: 21.3 % (ref 36.3–47.1)
HCT VFR BLD CALC: 21.4 % (ref 36.3–47.1)
HCT VFR BLD CALC: 22.7 % (ref 36.3–47.1)
HCT VFR BLD CALC: 24.5 % (ref 36.3–47.1)
HEMOGLOBIN: 6.7 G/DL (ref 11.9–15.1)
HEMOGLOBIN: 6.8 G/DL (ref 11.9–15.1)
HEMOGLOBIN: 7.6 G/DL (ref 11.9–15.1)
HEMOGLOBIN: 7.9 G/DL (ref 11.9–15.1)
IMMATURE GRANULOCYTES: 1 %
IMMATURE GRANULOCYTES: 1 %
LIPASE: 11 U/L (ref 13–60)
LYMPHOCYTES # BLD: 21 % (ref 24–43)
LYMPHOCYTES # BLD: 32 % (ref 24–43)
MAGNESIUM: 1.9 MG/DL (ref 1.6–2.6)
MCH RBC QN AUTO: 24.9 PG (ref 25.2–33.5)
MCH RBC QN AUTO: 25.4 PG (ref 25.2–33.5)
MCH RBC QN AUTO: 25.5 PG (ref 25.2–33.5)
MCHC RBC AUTO-ENTMCNC: 31.3 G/DL (ref 28.4–34.8)
MCHC RBC AUTO-ENTMCNC: 31.9 G/DL (ref 28.4–34.8)
MCHC RBC AUTO-ENTMCNC: 33.5 G/DL (ref 28.4–34.8)
MCV RBC AUTO: 76.2 FL (ref 82.6–102.9)
MCV RBC AUTO: 78 FL (ref 82.6–102.9)
MCV RBC AUTO: 81.1 FL (ref 82.6–102.9)
MONOCYTES # BLD: 8 % (ref 3–12)
MONOCYTES # BLD: 9 % (ref 3–12)
NRBC AUTOMATED: 0 PER 100 WBC
PDW BLD-RTO: 15.1 % (ref 11.8–14.4)
PLATELET # BLD: 251 K/UL (ref 138–453)
PLATELET # BLD: 276 K/UL (ref 138–453)
PLATELET # BLD: 315 K/UL (ref 138–453)
PMV BLD AUTO: 10 FL (ref 8.1–13.5)
PMV BLD AUTO: 10.3 FL (ref 8.1–13.5)
PMV BLD AUTO: 10.7 FL (ref 8.1–13.5)
POTASSIUM SERPL-SCNC: 3.7 MMOL/L (ref 3.7–5.3)
POTASSIUM SERPL-SCNC: 3.8 MMOL/L (ref 3.7–5.3)
POTASSIUM SERPL-SCNC: 5.5 MMOL/L (ref 3.7–5.3)
RBC # BLD: 2.64 M/UL (ref 3.95–5.11)
RBC # BLD: 2.73 M/UL (ref 3.95–5.11)
RBC # BLD: 2.98 M/UL (ref 3.95–5.11)
RBC # BLD: ABNORMAL 10*6/UL
RBC # BLD: ABNORMAL 10*6/UL
REASON FOR REJECTION: NORMAL
REASON FOR REJECTION: NORMAL
SEG NEUTROPHILS: 59 % (ref 36–65)
SEG NEUTROPHILS: 70 % (ref 36–65)
SEGMENTED NEUTROPHILS ABSOLUTE COUNT: 8.26 K/UL (ref 1.5–8.1)
SEGMENTED NEUTROPHILS ABSOLUTE COUNT: 8.76 K/UL (ref 1.5–8.1)
SODIUM BLD-SCNC: 133 MMOL/L (ref 135–144)
SODIUM BLD-SCNC: 136 MMOL/L (ref 135–144)
SODIUM BLD-SCNC: 140 MMOL/L (ref 135–144)
TOTAL PROTEIN: 5.3 G/DL (ref 6.4–8.3)
TROPONIN, HIGH SENSITIVITY: 6 NG/L (ref 0–14)
WBC # BLD: 11.8 K/UL (ref 3.5–11.3)
WBC # BLD: 13.7 K/UL (ref 3.5–11.3)
WBC # BLD: 15 K/UL (ref 3.5–11.3)
ZZ NTE CLEAN UP: ORDERED TEST: NORMAL
ZZ NTE CLEAN UP: ORDERED TEST: NORMAL
ZZ NTE WITH NAME CLEAN UP: SPECIMEN SOURCE: NORMAL
ZZ NTE WITH NAME CLEAN UP: SPECIMEN SOURCE: NORMAL

## 2022-11-01 PROCEDURE — 6360000002 HC RX W HCPCS: Performed by: NURSE PRACTITIONER

## 2022-11-01 PROCEDURE — 36620 INSERTION CATHETER ARTERY: CPT

## 2022-11-01 PROCEDURE — 99232 SBSQ HOSP IP/OBS MODERATE 35: CPT | Performed by: PSYCHIATRY & NEUROLOGY

## 2022-11-01 PROCEDURE — 2580000003 HC RX 258: Performed by: HEALTH CARE PROVIDER

## 2022-11-01 PROCEDURE — 85018 HEMOGLOBIN: CPT

## 2022-11-01 PROCEDURE — 85014 HEMATOCRIT: CPT

## 2022-11-01 PROCEDURE — 6360000002 HC RX W HCPCS: Performed by: HEALTH CARE PROVIDER

## 2022-11-01 PROCEDURE — 85025 COMPLETE CBC W/AUTO DIFF WBC: CPT

## 2022-11-01 PROCEDURE — 85027 COMPLETE CBC AUTOMATED: CPT

## 2022-11-01 PROCEDURE — 82330 ASSAY OF CALCIUM: CPT

## 2022-11-01 PROCEDURE — 6370000000 HC RX 637 (ALT 250 FOR IP): Performed by: HEALTH CARE PROVIDER

## 2022-11-01 PROCEDURE — 6360000002 HC RX W HCPCS

## 2022-11-01 PROCEDURE — 93005 ELECTROCARDIOGRAM TRACING: CPT | Performed by: EMERGENCY MEDICINE

## 2022-11-01 PROCEDURE — 80048 BASIC METABOLIC PNL TOTAL CA: CPT

## 2022-11-01 PROCEDURE — 6360000002 HC RX W HCPCS: Performed by: STUDENT IN AN ORGANIZED HEALTH CARE EDUCATION/TRAINING PROGRAM

## 2022-11-01 PROCEDURE — 83735 ASSAY OF MAGNESIUM: CPT

## 2022-11-01 PROCEDURE — 36415 COLL VENOUS BLD VENIPUNCTURE: CPT

## 2022-11-01 PROCEDURE — 6370000000 HC RX 637 (ALT 250 FOR IP): Performed by: NURSE PRACTITIONER

## 2022-11-01 PROCEDURE — 99233 SBSQ HOSP IP/OBS HIGH 50: CPT | Performed by: PSYCHIATRY & NEUROLOGY

## 2022-11-01 PROCEDURE — 94761 N-INVAS EAR/PLS OXIMETRY MLT: CPT

## 2022-11-01 PROCEDURE — 82947 ASSAY GLUCOSE BLOOD QUANT: CPT

## 2022-11-01 PROCEDURE — 36430 TRANSFUSION BLD/BLD COMPNT: CPT

## 2022-11-01 PROCEDURE — 6370000000 HC RX 637 (ALT 250 FOR IP): Performed by: STUDENT IN AN ORGANIZED HEALTH CARE EDUCATION/TRAINING PROGRAM

## 2022-11-01 PROCEDURE — 2000000000 HC ICU R&B

## 2022-11-01 PROCEDURE — 2500000003 HC RX 250 WO HCPCS

## 2022-11-01 PROCEDURE — 83690 ASSAY OF LIPASE: CPT

## 2022-11-01 PROCEDURE — 84484 ASSAY OF TROPONIN QUANT: CPT

## 2022-11-01 PROCEDURE — 2580000003 HC RX 258: Performed by: NURSE PRACTITIONER

## 2022-11-01 PROCEDURE — 2500000003 HC RX 250 WO HCPCS: Performed by: NURSE PRACTITIONER

## 2022-11-01 PROCEDURE — P9016 RBC LEUKOCYTES REDUCED: HCPCS

## 2022-11-01 PROCEDURE — 86900 BLOOD TYPING SEROLOGIC ABO: CPT

## 2022-11-01 PROCEDURE — 80053 COMPREHEN METABOLIC PANEL: CPT

## 2022-11-01 RX ORDER — FENTANYL CITRATE 50 UG/ML
INJECTION, SOLUTION INTRAMUSCULAR; INTRAVENOUS
Status: COMPLETED
Start: 2022-11-01 | End: 2022-11-01

## 2022-11-01 RX ORDER — CALCIUM GLUCONATE 20 MG/ML
1000 INJECTION, SOLUTION INTRAVENOUS ONCE
Status: COMPLETED | OUTPATIENT
Start: 2022-11-01 | End: 2022-11-01

## 2022-11-01 RX ORDER — ACETAMINOPHEN 500 MG
1000 TABLET ORAL ONCE
Status: COMPLETED | OUTPATIENT
Start: 2022-11-01 | End: 2022-11-01

## 2022-11-01 RX ORDER — 0.9 % SODIUM CHLORIDE 0.9 %
500 INTRAVENOUS SOLUTION INTRAVENOUS ONCE
Status: COMPLETED | OUTPATIENT
Start: 2022-11-01 | End: 2022-11-01

## 2022-11-01 RX ORDER — MIDAZOLAM HYDROCHLORIDE 2 MG/2ML
1 INJECTION, SOLUTION INTRAMUSCULAR; INTRAVENOUS ONCE
Status: COMPLETED | OUTPATIENT
Start: 2022-11-01 | End: 2022-11-01

## 2022-11-01 RX ORDER — MIDAZOLAM HYDROCHLORIDE 1 MG/ML
INJECTION INTRAMUSCULAR; INTRAVENOUS
Status: COMPLETED
Start: 2022-11-01 | End: 2022-11-01

## 2022-11-01 RX ORDER — FENTANYL CITRATE 50 UG/ML
50 INJECTION, SOLUTION INTRAMUSCULAR; INTRAVENOUS ONCE
Status: COMPLETED | OUTPATIENT
Start: 2022-11-01 | End: 2022-11-01

## 2022-11-01 RX ORDER — SODIUM CHLORIDE 9 MG/ML
INJECTION, SOLUTION INTRAVENOUS PRN
Status: DISCONTINUED | OUTPATIENT
Start: 2022-11-01 | End: 2022-11-03

## 2022-11-01 RX ORDER — ACETAMINOPHEN 325 MG/1
650 TABLET ORAL EVERY 6 HOURS PRN
Status: DISCONTINUED | OUTPATIENT
Start: 2022-11-01 | End: 2022-11-03 | Stop reason: HOSPADM

## 2022-11-01 RX ADMIN — PANTOPRAZOLE SODIUM 40 MG: 40 TABLET, DELAYED RELEASE ORAL at 09:18

## 2022-11-01 RX ADMIN — KETOROLAC TROMETHAMINE 15 MG: 30 INJECTION, SOLUTION INTRAMUSCULAR; INTRAVENOUS at 02:19

## 2022-11-01 RX ADMIN — CALCIUM GLUCONATE 1000 MG: 20 INJECTION, SOLUTION INTRAVENOUS at 10:24

## 2022-11-01 RX ADMIN — FENTANYL CITRATE 50 MCG: 50 INJECTION, SOLUTION INTRAMUSCULAR; INTRAVENOUS at 16:24

## 2022-11-01 RX ADMIN — FENTANYL CITRATE 50 MCG: 50 INJECTION INTRAMUSCULAR; INTRAVENOUS at 16:24

## 2022-11-01 RX ADMIN — Medication 81 MG: at 09:18

## 2022-11-01 RX ADMIN — NORETHINDRONE ACETATE 5 MG: 5 TABLET ORAL at 20:21

## 2022-11-01 RX ADMIN — FENTANYL CITRATE 50 MCG: 50 INJECTION, SOLUTION INTRAMUSCULAR; INTRAVENOUS at 17:00

## 2022-11-01 RX ADMIN — ACETAMINOPHEN 1000 MG: 500 TABLET ORAL at 03:22

## 2022-11-01 RX ADMIN — MIDAZOLAM HYDROCHLORIDE 1 MG: 1 INJECTION, SOLUTION INTRAMUSCULAR; INTRAVENOUS at 16:16

## 2022-11-01 RX ADMIN — CLOPIDOGREL 75 MG: 75 TABLET, FILM COATED ORAL at 09:18

## 2022-11-01 RX ADMIN — CALCIUM GLUCONATE 1000 MG: 20 INJECTION, SOLUTION INTRAVENOUS at 06:57

## 2022-11-01 RX ADMIN — NORETHINDRONE ACETATE 5 MG: 5 TABLET ORAL at 17:41

## 2022-11-01 RX ADMIN — KETOROLAC TROMETHAMINE 15 MG: 30 INJECTION, SOLUTION INTRAMUSCULAR; INTRAVENOUS at 09:38

## 2022-11-01 RX ADMIN — ATORVASTATIN CALCIUM 80 MG: 80 TABLET, FILM COATED ORAL at 20:20

## 2022-11-01 RX ADMIN — SODIUM CHLORIDE 500 ML: 9 INJECTION, SOLUTION INTRAVENOUS at 02:09

## 2022-11-01 RX ADMIN — ACETAMINOPHEN 650 MG: 325 TABLET ORAL at 20:20

## 2022-11-01 RX ADMIN — DOCUSATE SODIUM 50 MG AND SENNOSIDES 8.6 MG 2 TABLET: 8.6; 5 TABLET, FILM COATED ORAL at 09:18

## 2022-11-01 RX ADMIN — IRON SUCROSE 500 MG: 20 INJECTION, SOLUTION INTRAVENOUS at 10:27

## 2022-11-01 RX ADMIN — MIDAZOLAM HYDROCHLORIDE 1 MG: 2 INJECTION, SOLUTION INTRAMUSCULAR; INTRAVENOUS at 16:16

## 2022-11-01 RX ADMIN — FENTANYL CITRATE 50 MCG: 50 INJECTION INTRAMUSCULAR; INTRAVENOUS at 01:59

## 2022-11-01 RX ADMIN — NORETHINDRONE ACETATE 5 MG: 5 TABLET ORAL at 09:18

## 2022-11-01 RX ADMIN — ONDANSETRON 4 MG: 2 INJECTION INTRAMUSCULAR; INTRAVENOUS at 02:01

## 2022-11-01 ASSESSMENT — PAIN SCALES - GENERAL
PAINLEVEL_OUTOF10: 10
PAINLEVEL_OUTOF10: 3
PAINLEVEL_OUTOF10: 5

## 2022-11-01 NOTE — PROGRESS NOTES
Pt refusing lab re-draw. ART line placement attempted by Dr. Yudi Peace, attempts were unsuccessful. RN educated patient about the importance of CBC re-draw. Will continue to monitor.

## 2022-11-01 NOTE — PROGRESS NOTES
Writer ordered a post transfusion H & H after the blood was done being transfused. The  was unable to get blood from the patient to run the labs. The writer and another nurse attempted to get blood from the patient with no success. RT was asked to get an arterial draw to send blood. Labs were sent after getting blood from the artery. Writer was informed later that the blood had clotted off by the time they reached lab. Writer called lab to ask if someone else could come draw blood. Dayshift nurse aware of situation. Will continue to monitor.

## 2022-11-01 NOTE — PROCEDURES
PROCEDURE NOTE - ARTERIAL LINE PLACEMENT    PATIENT NAME: [id number] Pinon RECORD NO. [id number]  DATE: 11/1/2022  ATTENDING PHYSICIAN:        PREOPERATIVE DIAGNOSIS:  Need for blood pressure monitoring  POSTOPERATIVE DIAGNOSIS:  Same  PROCEDURE PERFORMED: Left Femoral Arterial Line Insertion  PERFORMING PHYSICIAN:  Lexie Real MD  ESTIMATED BLOOD LOSS:  Less than 25 ml  COMPLICATIONS:  None immediately appreciated. DISCUSSION:  Boom Fry is a 40 y.o. female who requires invasive pressure monitoring. The history and physical examination were reviewed and confirmed. CONSENT: The patient provided verbal consent for this procedure. PROCEDURE:  A timeout was initiated by the bedside nurse and was confirmed by those present. The patient was placed in a supine position. The skin overlying the Left Femoral was prepped with chlorhexadine. Through this region, the introducer needle through catheter was inserted into L femoral artery until pulsatile bright blood was seen in collection tubing. Guidewire was advanced with no resistance. Catheter was advanced into the artery, wire was pulled with brisk bleeding noted. Pressure monitoring setup was connected to the catheter, it aspirated and flushed easily. The catheter was secured to the wrist with 3-0 silk. Attempt was made at L axillary artery that was unsuccessful and a few hours prior in the L radial artery. No immediate complication was evident. All sponge, instrument and needle counts were correct at the completion of the procedure.       Lexie Real MD  5:33 PM, 11/1/22

## 2022-11-01 NOTE — PROGRESS NOTES
Writer at bedside helping patient get to the bathroom but patient states she is too dizzy to get up. Writer disposed of even more clots than previously passed. Primary called to bedside as patient is now saying her stomach is a 10/10 pain, diaphoretic and she feels like she is going to pass out. Blood was ordered and started, Zofran was given, Fentanyl and Toradol were given, EKG was done and labs were drawn. Will continue to monitor.

## 2022-11-01 NOTE — PROGRESS NOTES
Physician Progress Note      PATIENT:               Mamta Elizondo  [facility] #:                  [id number]  :                       1985  ADMIT DATE:       10/31/2022 3:34 AM  DISCH DATE:  RESPONDING  PROVIDER #:        Adrian Burton MD          QUERY TEXT:    Pt admitted with [facility] and has anemia documented. If possible, please   document in progress notes and discharge summary further specificity regarding   the acuity and type of anemia:    The medical record reflects the following:  Risk Factors: enlarged fibroid uterus  Clinical Indicators: Hgb  6.5, Hct 21.8, RBC 2.72,  Treatment: Transfused 2 units PRBC's, Icu Monitoring    Thank you, Please call if questions  Ainsley Riojas RN [facility]  [phone]  Options provided:  -- Anemia due to acute blood loss due to uterine bleeding  -- Anemia due to acute on chronic blood loss due to uterine bleeding  -- Other - I will add my own diagnosis  -- Disagree - Not applicable / Not valid  -- Disagree - Clinically unable to determine / Unknown  -- Refer to Clinical Documentation Reviewer    PROVIDER RESPONSE TEXT:    This patient has acute blood loss anemia due to uterine bleeding.     Query created by: Nena Bullard on 2022 12:04 PM      Electronically signed by:  Adrian Burton MD 2022 7:53 PM

## 2022-11-01 NOTE — PROGRESS NOTES
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 History of Chief Complaint:      Ms Renteta Ocampo is a 39 y/o F with pmh significant for obesity, Htn presented with transient episode of slurred speech, right facial numbness and right arm weakness and numbness. Patient also had some headache developed after onset of symptoms. Symptoms resolved in 3-4 hrs. CTA head and neck showed b/l ICA intracranial severe stenosis with Collie Patt like vasculopathy, therefore endovascular team was consulted. Patient denied any headache, weakness or numbness today. Review of Systems:  CONSTITUTIONAL:  negative for fevers, chills, fatigue and malaise    EYES:  negative for double vision, blurred vision and photophobia     HEENT:  negative for tinnitus, epistaxis and sore throat    RESPIRATORY:  negative for cough, shortness of breath, wheezing    CARDIOVASCULAR:  negative for chest pain, palpitations, syncope, edema    GASTROINTESTINAL:  negative for nausea, vomiting    GENITOURINARY:  negative for incontinence    MUSCULOSKELETAL:  negative for neck or back pain    NEUROLOGICAL:  Negative for weakness and tingling  negative for headaches and dizziness    PSYCHIATRIC:  negative for anxiety      Review of systems otherwise negative. OBJECTIVE:     Vitals:    11/01/22 0515   BP: (!) 150/71   Pulse: 64   Resp: 21   Temp:    SpO2: 100%        General:  Gen: normal habitus, NAD  HEENT: NCAT, mucosa moist  Cvs: RRR, S1 S2 normal  Resp: symmetric unlabored breathing  Abd: s/nd/nt  Ext: no edema  Skin: no lesions seen, warm and dry    Neuro:  Gen: awake and alert, oriented x4. Lang/speech: no aphasia or dysarthria. Follows commands.   CN: PERRL, EOMI, VFF, V1-3 intact, face symmetric, hearing intact, shoulder shrug symmetric, tongue midline  Motor: grossly 5/5 UE and LE b/l  Sense: LT intact in all 4 ext. Coord: FTN and HTS intact b/l  DTR: deferred  Gait: narrow base gait    NIH Stroke Scale:   1a  Level of consciousness: 0 - alert; keenly responsive   1b. LOC questions:  0 - answers both questions correctly   1c. LOC commands: 0 - performs both tasks correctly   2. Best Gaze: 0 - normal   3. Visual: 0 - no visual loss   4. Facial Palsy: 0 - normal symmetric movement   5a. Motor left arm: 0 - no drift, limb holds 90 (or 45) degrees for full 10 seconds   5b. Motor right arm: 0 - no drift, limb holds 90 (or 45) degrees for full 10 seconds   6a. Motor left le - no drift; leg holds 30 degree position for full 5 seconds   6b  Motor right le - no drift; leg holds 30 degree position for full 5 seconds   7. Limb Ataxia: 0 - absent   8. Sensory: 0 - normal; no sensory loss   9. Best Language:  0 - no aphasia, normal   10. Dysarthria: 0 - normal   11. Extinction and Inattention: 0 - no abnormality         Total:   0     MRS: 0      LABS:   Reviewed. Lab Results   Component Value Date    HGB 6.8 (LL) 2022    WBC 15.0 (H) 2022     2022     2022    BUN 9 2022    CREATININE 0.52 2022    AST 18 2022    ALT 8 2022    MG 1.9 2022    APTT 28.2 10/30/2022    INR 1.1 10/30/2022      Lab Results   Component Value Date/Time    COVID19 Not Detected 2021 01:24 PM       RADIOLOGY:   Images were personally reviewed including:  CTA head and neck:   Severe stenosis in the distal supraclinoid segment of the right internal   carotid artery, with occlusion in the proximal M1 segment of the right MCA   and at origin of A1 segment of the right NEREIDA, with prominent collateral   vessels, 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 No acute abnormality or flow-limiting stenosis of the major arteries of the   neck. MRI brain:   No acute infarct, hemorrhage or intracranial mass effect. Sequelae of moyamoya, as detailed above. ASSESSMENT:     39 y/o F with pmh significant for obesity, Htn presented with transient episode of slurred speech, right facial numbness and right arm weakness and numbness. 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 MRI brain No strokes/hemorrhage. Patient was recommended to have DSA to evaluate for [place] Wild and assess collateral flow. Anemia: Hb was 6.5     Risks and benefits discussed, risks include but are not limited to bruising, stroke, subarachnoid hemorrhage, death, retroperitoneal hematoma, pseudoaneurysm, lower extremity/renal/peripheral vascular compromise, informed consent obtained. PLAN:   --DSA today  --Right radial access   --Anemia: blood transfusion per ICU team   --Management per ICU team   --hold aspirin and plavix for now inlight of her active bleeding. Case discussed with Dr. Santiago  attending.     Jaziel Kahn MD    Stroke, [facility]  [id number]  Electronically signed 11/1/2022 at 7:14 AM

## 2022-11-01 NOTE — PROGRESS NOTES
Daily Progress Note  Neuro Critical Care    Patient Name: Jorge L Longoria  Patient : 1985  Room/Bed: 0126/0126-01  Code Status: Full code  Allergies: Allergies   Allergen Reactions    Vancomycin Itching     Patient felt like skin was on fire. CHIEF COMPLAINT:      R arm weakness/numbness     INTERVAL HISTORY    Initial Presentation (Admitted 10/31/22): The patient is a 40 y.o. female w/ PMHx significant for hypertension, hyperlipidemia, diabetes who presented to the ER with complaint of right-sided arm numbness as well as slurred speech and right facial droop. Patient states that she was cooking with her fiancé approximately 2 hours ago when he noticed that she was slurring her speech and had a right-sided facial droop. Patient was also complaining of numbness in her arm. At time of initial evaluation in the ER, she stated that her right-sided facial droop and slurred speech had improved she still was having consistent right arm numbness. Denied any fever or chills or blood thinner use. Does have a strong family history of acute CVA. Denies any chest pain, shortness of breath, neck stiffness, blurry vision, double vision, difficulty swallowing, bowel pain, nausea vomiting or diarrhea. CTH negative for any intracranial abnormalities. 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 Given ASA and Plavix in the ER. Transferred to [facility] neuro ICU for further monitoring and workup. At time of initial examination, patient was still asymptomatic. AAOx3, GCS 15. No complaints at this time.     Hospital Course:   Last 24 hours: Continued vaginal bleeding. OB/GYN was consulted and patient was started on Aygestin and Lupron. Patient status post 3 units PRBC. DSA 22. No further weakness or numbness.       CURRENT MEDICATIONS:  SCHEDULED MEDICATIONS:   atorvastatin  80 mg Oral Nightly    clopidogrel  75 mg Oral Daily    aspirin  81 mg Oral Daily    [Held by provider] metoprolol succinate  100 mg Oral Daily    pantoprazole  40 mg Oral QAM AC    sennosides-docusate sodium  2 tablet Oral Daily    iron sucrose  500 mg IntraVENous Q24H    [START ON 11/3/2022] ferrous sulfate  325 mg Oral BID WC    leuprolide  11.25 mg IntraMUSCular Once    norethindrone  5 mg Oral TID     CONTINUOUS INFUSIONS:   sodium chloride      sodium chloride 100 mL/hr at 22 0207    sodium chloride      sodium chloride       PRN MEDICATIONS:   sodium chloride, ondansetron **OR** ondansetron, polyethylene glycol, sodium chloride, ketorolac, sodium chloride    VITALS:  Temperature Range: Temp: 98 °F (36.7 °C) Temp  Av °F (36.7 °C)  Min: 97.8 °F (36.6 °C)  Max: 98.8 °F (37.1 °C)  BP Range: Systolic (93ASB), CATHY:575 , Min:104 , IRB:782     Diastolic (39CYR), KAV:34, Min:53, Max:142    Pulse Range: Pulse  Av.1  Min: 57  Max: 84  Respiration Range: Resp  Av.3  Min: 8  Max: 27  Current Pulse Ox: SpO2: 100 %  24HR Pulse Ox Range: SpO2  Av.6 %  Min: 97 %  Max: 100 %  Patient Vitals for the past 12 hrs:   BP Temp Temp src Pulse Resp SpO2   22 0515 (!) 150/71 -- -- 64 21 100 %   22 0500 (!) 160/87 -- -- 67 21 99 %   22 0445 (!) 160/84 -- -- 63 20 100 %   22 0430 (!) 147/86 -- -- 70 23 100 %   22 0415 (!) 152/82 -- -- 69 22 100 %   22 0400 137/78 98 °F (36.7 °C) -- 63 18 100 %   22 0345 (!) 159/84 -- -- 61 22 100 %   22 0330 (!) 157/78 -- -- 63 22 100 %   22 0315 (!) 153/91 -- -- 64 21 100 %   22 0300 (!) 144/80 -- -- 59 21 100 %   22 0245 (!) 155/79 -- -- 60 21 100 %   22 0230 128/75 -- -- 64 23 98 %   11/01/22 0220 126/64 -- -- 62 24 97 %   11/01/22 0215 117/61 97.9 °F (36.6 °C) -- 62 11 97 %   11/01/22 0210 (!) 120/54 98 °F (36.7 °C) -- 69 (!) 8 99 %   11/01/22 0205 125/64 98.3 °F (36.8 °C) -- 67 11 100 %   11/01/22 0200 (!) 130/58 98.2 °F (36.8 °C) -- 72 17 100 %   11/01/22 0155 (!) 131/56 98 °F (36.7 °C) -- 72 16 100 %   11/01/22 0150 104/88 97.8 °F (36.6 °C) -- 74 18 99 %   11/01/22 0145 (!) 154/142 98.2 °F (36.8 °C) Oral 82 15 99 %   11/01/22 0144 (!) 116/53 98 °F (36.7 °C) -- 81 17 99 %   11/01/22 0140 (!) 116/53 -- -- 77 23 99 %   10/31/22 2000 (!) 116/105 -- -- 64 22 100 %   10/31/22 1900 133/79 97.9 °F (36.6 °C) Oral 57 20 100 %   10/31/22 1854 -- -- -- 65 19 --   10/31/22 1845 (!) 130/106 -- -- 65 21 99 %     Estimated body mass index is 50.73 kg/m² as calculated from the following:    Height as of this encounter: 5' 3\" (1.6 m). Weight as of this encounter: 286 lb 6 oz (129.9 kg).  []<16 Severe malnutrition  []16-16.99 Moderate malnutrition  []17-18.49 Mild malnutrition  []18.5-24.9 Normal  []25-29.9 Overweight (not obese)  []30-34.9 Obese class 1 (Low Risk)  []35-39.9 Obese class 2 (Moderate Risk)  [x]? 40 Obese class 3 (High Risk)    RECENT LABS:   Lab Results   Component Value Date    WBC 15.0 (H) 11/01/2022    HGB 6.8 (LL) 11/01/2022    HCT 21.3 (L) 11/01/2022     11/01/2022    CHOL 148 10/31/2022    TRIG 66 10/31/2022    HDL 48 10/31/2022    ALT 8 11/01/2022    AST 18 11/01/2022     11/01/2022    K 3.8 11/01/2022     (H) 11/01/2022    CREATININE 0.52 11/01/2022    BUN 9 11/01/2022    CO2 20 11/01/2022    TSH 2.12 09/12/2022    INR 1.1 10/30/2022    LABA1C 5.2 10/31/2022     24 HOUR INTAKE/OUTPUT:  Intake/Output Summary (Last 24 hours) at 11/1/2022 0636  Last data filed at 11/1/2022 0400  Gross per 24 hour   Intake 4504.17 ml   Output --   Net 4504.17 ml       IMAGING:   MRI brain without contrast   Final Result   No acute infarct, hemorrhage or intracranial mass effect. Sequelae of moyamoya, as detailed above. IR ANGIOGRAM CAROTID C EREBRAL BILATERAL    (Results Pending)        Labs and Images reviewed with:  [] Dr. Melissa Sanabria. Ab    [x] Dr. Smith Degree  [] Dr. Daren Ware  [] There are no new interval images to review. PHYSICAL EXAM       CONSTITUTIONAL:  Well developed, well nourished, alert and oriented x 3, in no acute distress. GCS 15. Nontoxic. No dysarthria. No aphasia. HEAD:  normocephalic, atraumatic    EYES:  PERRLA, EOMI.   ENT:  moist mucous membranes   NECK:  supple, symmetric   LUNGS:  Equal air entry bilaterally   CARDIOVASCULAR:  normal s1 / s2, RRR, distal pulses intact   ABDOMEN:  Soft, no rigidity   NEUROLOGIC:  Mental Status:  A & O x3,awake             Cranial Nerves:    cranial nerves II-XII are grossly intact    Motor Exam:    Drift:  absent  Tone:  normal    Motor exam is symmetrical 5 out of 5 all extremities bilaterally    Sensory:    Touch:    Right Upper Extremity:  normal  Left Upper Extremity:  normal  Right Lower Extremity:  normal  Left Lower Extremity:  normal         DRAINS:  [x] There are no drains for Neuro Critical Care to monitor at this time. ASSESSMENT AND PLAN:       69-year-old female, history of pretension, HLD, DM admitted for valuation of possible moyamoya now with vaginal bleeding for which OB/GYN has been consulted. Patient was found to have occlusion in the proximal M1 segment of the right MCA and at origin of the A1 segment of the right NEREIDA with prominent collateral vessels. Currently treating anemia with PRBC transfusions and waiting DSA by Rian Andersen. 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   - MRI nonacute  - loaded with ASA, Plavix in ED  - cont Plavix 75mg daily, ASA 81mg daily  - AEDs not indicated  - Goal -180  - q1hr neuro checks  - DSA 11/1     CARDIOVASCULAR:  - Goal -180  - Cont home metoprolol   - 5.2 A1c  - Lipitor 80mg daily     PULMONARY:  - No acute respiratory concerns at this time     RENAL/FLUID/ELECTROLYTE:  - BUN 9/ Creatinine 0.77  - I/O: No intake/output data recorded. - IVF: 100ml/hr while NPO  - Ionized Ca 1.1, replacing     GI/NUTRITION:  NUTRITION:  Diet NPO Exceptions are: Sips of Water with Meds  - Bowel regimen: glycolax, Zofran PRN  - GI prophylaxis: cont home PPI     ID:  - Afebrile  - Leukocytosis 15, likely stress response  - F/u UA    HEME:  - Anemia likely 2/2 vaginal bleeding, fibroids  - OB/Gyn consulted, appreciated recommendations  - Hgb 6.8 prior to 3rd unit. Awaiting repeat Hgb post transfusion  - s/p 3 u pRBC  - Aygestin 5mg TID, Lupron per OB recs  - Venofer x 3 days and ferrous sulfate 325mg BID       OTHER:  - PT/OT  - Code Status: Full Code     PROPHYLAXIS:  Stress ulcer: continue home PPI     DVT PROPHYLAXIS:  - SCD sleeves - Thigh High     DISPOSITION:  [x] To remain ICU: Awaiting DSA, management of vaginal bleeding  [] OK for out of ICU from Neuro Critical Care standpoint    We will continue to follow along. For any changes in exam or patient status please contact Neuro Critical Care.       Candice Spear MD  PGY 2  Neuro Critical Care  11/1/2022     6:36 AM

## 2022-11-01 NOTE — PROGRESS NOTES
Gynecology Progress Note    Date: 2022  Time: 6:55 AM    Alyse Ramon is a 40 y.o. female  HD# 3    Patient was seen and examined. She complained of nothing besides continued vaginal bleeding at this time. She was asleep upon entering room. Pain is  controlled. Patient is  tolerating oral intake. She is urinating moderately . She reports vaginal bleeding although patient and nurse state it was lighter overnight than previously. She is  ambulating without difficulty. She is  passing flatus. She denies Fever/Chills, Chest Pain, SOB, N/V, Calf Pain    Vitals:  Vitals:    22 0500 22 0530 22 0600 22 0630   BP:   (!) 140/63    Pulse: 76 84 82 81   Resp: 25 27 24 22   Temp:   98 °F (36.7 °C)    TempSrc:       SpO2:       Weight:       Height:             Intake/Output:   Last Shift: I/O last 3 completed shifts: In: 4504.2 [P.O.:500; I.V.:1800; Blood:1428.3; IV Piggyback:775.8]  Out: -   Current Shift: I/O this shift:  In: 315 [Blood:315]  Out: -     Physical Exam:  General:  no apparent distress, alert, and cooperative  Neurologic:  alert, oriented, normal speech, no focal findings or movement disorder noted  Lungs:  No increased work of breathing, good air exchange, clear to auscultation bilaterally, no crackles or wheezing  Heart:  regular rate and rhythm and no murmur    Abdomen: Abdomen soft, non-tender.  BS normal. No masses,  No organomegaly  Incision: none  Extremities:  no calf tenderness, non edematous    Lab:  Complete Blood Count:   Recent Labs     22  1448 22  1745 22  2215 22  0539   WBC 13.7* 11.8*  --  9.7   HGB 7.6* 6.7* 7.9* 6.5*   HCT 22.7* 21.4* 24.5* 20.6*    251  --  208        PT/INR:    Lab Results   Component Value Date/Time    PROTIME 14.4 10/30/2022 10:00 PM    INR 1.1 10/30/2022 10:00 PM     PTT:    Lab Results   Component Value Date/Time    APTT 28.2 10/30/2022 10:00 PM       Comprehensive Metabolic Profile:   Recent Labs     22  0215 22  0725 22  1745 22  0539    140 133* 141   K 3.8 5.5* 3.7 3.2*   * 112* 105 114*   CO2 20 18* 20 18*   BUN 9 11 11 10   CREATININE 0.52 0.53 0.53 0.42*   GLUCOSE 125* 89 85 78   CALCIUM 7.8* 8.1* 8.0* 7.0*   PROT 5.3*  --   --   --    LABALBU 2.9*  --   --   --    BILITOT 0.4  --   --   --    ALKPHOS 42  --   --   --    AST 18  --   --   --    ALT 8  --   --   --         Assessment/Plan:  Alena Hamlin 40 y.o. female  HD# 3 AUB 2/2 Uterine Fibroids   - Doing well, vitals stable with continued elevated BP   - Pelvic exam 10/31 showing enlarged fibroid uterus   - Patient complains this am and continued bleeding, however she does think it is a bit less than previous, and nurse agrees with this   -  Hgb 7.2>6.5>1u>6.8> 1u> 7.0> 6.8>1 unit > 7.9>6.5   - Another unit of blood ordered this am per primary team  - CBC this am with Hgb of 6.5 otherwise unremarkable,   - BMP with Hypokalemia at 3.2 replacement ordered  - Mag 1.5 replacement ordered   - Lupron 11.25 mg ordered, should arrive today   - Aygestin 5 mg TID   - Toradol 15 mg q6hr as needed; patient states pain is controlled  - ASA 81 mg, Plavix 75 qd - currently held per primary team; please defer all anticoagulation recommendations per Neuro team. From OB standpoint patient does not need her anticoagulant held. - S/p Venofer x 3, PO iron   - continue IVF   - Plan to continue with Lupron for hopeful resolution of heavy vaginal bleeding   - Continue care, per primary team, please page with any questions      OB/GYN will continue to follow Goals of inpatient admission to control bleeding to bridge for further outpatient workup and management. Patient understands and agreeable.      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     Priority: Medium    Numbness and tingling 10/31/2022     Priority: Medium    CLARISSA (iron deficiency anemia) 09/22/2022     Priority: Medium    Morbid obesity with BMI of 50.0-59.9, adult ([facility] [address] 06/09/2022     Priority: Medium    Abnormal uterine bleeding 10/31/2022     Overview Note:     AUB-L      Atypical glandular cells on cervical Pap smear 04/06/2022     Overview Note:     2022 - needs colp and EMB      S/P laparoscopic sleeve gastrectomy 03/07/2022    Esophageal dysphagia     Antral gastritis     IUD migration 07/02/2021    Vitamin D deficiency 06/02/2021    Essential hypertension 02/06/2019    Hyperlipidemia 05/08/2016    Pre-diabetes 05/08/2016         Sebas Juarez DO  Ob/Gyn Resident   11/2/2022, 6:55 AM    Attending Physician Statement  I have personally seen, evaluated and discussed the care of Vikas Zuñiga, including pertinent history and exam findings with the resident. I have reviewed and edited their note in the electronic medical record. The key elements of all parts of the encounter have been performed/reviewed by me. I agree with the assessment, plan and orders as documented by the resident. The level of care submitted represents to the best of my ability the care documented in the medical record today. GC Modifier. This service has been performed in part by a resident under the direction of a teaching physician. Patient seen and examined this afternoon. Bleeding has been stable through out the day. Lupron was injected in patient's right gluteus with OBGYN resident Kenrick Grayson. She tolerated injection well. Hgb after transfusion was 8.9. Repeat CBC ordered.      Attending's Name:  Raleigh Jarvis DO  Date: 11/2/2022  Time: 4:02 PM

## 2022-11-01 NOTE — PROGRESS NOTES
Obstetric/Gynecology Resident Interval Note    Informed by RN that patient up to bathroom and passed a large clot which was set aside. Per RN, patient has felt lightheaded and dizzy throughout the day. Patient's has now received 2U PRBC this admission with plan for repeat H&H now. Will continue to transfuse to keep Hgb >7. Writer at bedside to examine patient. Large softball size clot noted on sheet. Patient reports passage of these clots every few hours. Patient is not a candidate for estrogen or TXA therapy to assist in bleeding. Discussed expectation that with her known enlarged fibroid uterus she will continue to have vaginal bleeding especially with the initiation of blood thinners. She is currently scheduled to undergo angiogram tomorrow AM. Lupron has been ordered for treatment of the bleeding however is not in stock. Called pharmacy and confirmed that the medication is to be ordered for administration. Aygestin therapy initiated to treat heavy bleeding at this time. Continue menstrual pad counts and H&H monitoring with plan to transfuse to keep Hgb >7. Vitals are overall stable. No acute surgical intervention planned. Attending updated on patient and Lupron status.     Farhad Coleman DO  OB/GYN Resident, [id number]  10/31/2022, 9:19 PM

## 2022-11-02 PROBLEM — D25.9 UTERINE LEIOMYOMA: Status: ACTIVE | Noted: 2022-11-02

## 2022-11-02 LAB
ANION GAP SERPL CALCULATED.3IONS-SCNC: 9 MMOL/L (ref 9–17)
BACTERIA: ABNORMAL
BILIRUBIN URINE: NEGATIVE
BUN BLDV-MCNC: 10 MG/DL (ref 6–20)
CALCIUM SERPL-MCNC: 7 MG/DL (ref 8.6–10.4)
CHLORIDE BLD-SCNC: 114 MMOL/L (ref 98–107)
CO2: 18 MMOL/L (ref 20–31)
COLOR: ABNORMAL
CREAT SERPL-MCNC: 0.42 MG/DL (ref 0.5–0.9)
EPITHELIAL CELLS UA: ABNORMAL /HPF (ref 0–5)
GFR SERPL CREATININE-BSD FRML MDRD: >60 ML/MIN/1.73M2
GLUCOSE BLD-MCNC: 78 MG/DL (ref 70–99)
GLUCOSE URINE: NEGATIVE
HCT VFR BLD CALC: 20.6 % (ref 36.3–47.1)
HCT VFR BLD CALC: 26.9 % (ref 36.3–47.1)
HEMOGLOBIN: 6.5 G/DL (ref 11.9–15.1)
HEMOGLOBIN: 8.9 G/DL (ref 11.9–15.1)
KETONES, URINE: ABNORMAL
LEUKOCYTE ESTERASE, URINE: ABNORMAL
LV EF: 62 %
LVEF MODALITY: NORMAL
MAGNESIUM: 1.5 MG/DL (ref 1.6–2.6)
MCH RBC QN AUTO: 26.3 PG (ref 25.2–33.5)
MCHC RBC AUTO-ENTMCNC: 31.6 G/DL (ref 28.4–34.8)
MCV RBC AUTO: 83.4 FL (ref 82.6–102.9)
NITRITE, URINE: NEGATIVE
NRBC AUTOMATED: 0.2 PER 100 WBC
PDW BLD-RTO: 15.5 % (ref 11.8–14.4)
PH UA: 6 (ref 5–8)
PLATELET # BLD: 208 K/UL (ref 138–453)
PMV BLD AUTO: 10.2 FL (ref 8.1–13.5)
POTASSIUM SERPL-SCNC: 3.2 MMOL/L (ref 3.7–5.3)
PROTEIN UA: ABNORMAL
RBC # BLD: 2.47 M/UL (ref 3.95–5.11)
RBC UA: ABNORMAL /HPF (ref 0–4)
REASON FOR REJECTION: NORMAL
SODIUM BLD-SCNC: 141 MMOL/L (ref 135–144)
SPECIFIC GRAVITY UA: 1.04 (ref 1–1.03)
TURBIDITY: ABNORMAL
URINE HGB: ABNORMAL
UROBILINOGEN, URINE: NORMAL
WBC # BLD: 9.7 K/UL (ref 3.5–11.3)
WBC UA: ABNORMAL /HPF (ref 0–5)
ZZ NTE CLEAN UP: ORDERED TEST: NORMAL
ZZ NTE WITH NAME CLEAN UP: SPECIMEN SOURCE: NORMAL

## 2022-11-02 PROCEDURE — 94761 N-INVAS EAR/PLS OXIMETRY MLT: CPT

## 2022-11-02 PROCEDURE — 6360000002 HC RX W HCPCS: Performed by: NURSE PRACTITIONER

## 2022-11-02 PROCEDURE — 85014 HEMATOCRIT: CPT

## 2022-11-02 PROCEDURE — 99233 SBSQ HOSP IP/OBS HIGH 50: CPT | Performed by: PSYCHIATRY & NEUROLOGY

## 2022-11-02 PROCEDURE — 6360000002 HC RX W HCPCS: Performed by: STUDENT IN AN ORGANIZED HEALTH CARE EDUCATION/TRAINING PROGRAM

## 2022-11-02 PROCEDURE — 87086 URINE CULTURE/COLONY COUNT: CPT

## 2022-11-02 PROCEDURE — 6370000000 HC RX 637 (ALT 250 FOR IP): Performed by: NURSE PRACTITIONER

## 2022-11-02 PROCEDURE — 85018 HEMOGLOBIN: CPT

## 2022-11-02 PROCEDURE — 36415 COLL VENOUS BLD VENIPUNCTURE: CPT

## 2022-11-02 PROCEDURE — 80048 BASIC METABOLIC PNL TOTAL CA: CPT

## 2022-11-02 PROCEDURE — P9016 RBC LEUKOCYTES REDUCED: HCPCS

## 2022-11-02 PROCEDURE — 6370000000 HC RX 637 (ALT 250 FOR IP): Performed by: HEALTH CARE PROVIDER

## 2022-11-02 PROCEDURE — 86403 PARTICLE AGGLUT ANTBDY SCRN: CPT

## 2022-11-02 PROCEDURE — 93306 TTE W/DOPPLER COMPLETE: CPT

## 2022-11-02 PROCEDURE — 36430 TRANSFUSION BLD/BLD COMPNT: CPT

## 2022-11-02 PROCEDURE — 81001 URINALYSIS AUTO W/SCOPE: CPT

## 2022-11-02 PROCEDURE — 2580000003 HC RX 258: Performed by: NURSE PRACTITIONER

## 2022-11-02 PROCEDURE — 6370000000 HC RX 637 (ALT 250 FOR IP): Performed by: STUDENT IN AN ORGANIZED HEALTH CARE EDUCATION/TRAINING PROGRAM

## 2022-11-02 PROCEDURE — 2000000000 HC ICU R&B

## 2022-11-02 PROCEDURE — 99231 SBSQ HOSP IP/OBS SF/LOW 25: CPT | Performed by: STUDENT IN AN ORGANIZED HEALTH CARE EDUCATION/TRAINING PROGRAM

## 2022-11-02 PROCEDURE — 86900 BLOOD TYPING SEROLOGIC ABO: CPT

## 2022-11-02 PROCEDURE — 6360000002 HC RX W HCPCS

## 2022-11-02 PROCEDURE — 83735 ASSAY OF MAGNESIUM: CPT

## 2022-11-02 PROCEDURE — 85027 COMPLETE CBC AUTOMATED: CPT

## 2022-11-02 RX ORDER — POTASSIUM CHLORIDE 20 MEQ/1
40 TABLET, EXTENDED RELEASE ORAL PRN
Status: DISCONTINUED | OUTPATIENT
Start: 2022-11-02 | End: 2022-11-03 | Stop reason: HOSPADM

## 2022-11-02 RX ORDER — MAGNESIUM SULFATE IN WATER 40 MG/ML
2000 INJECTION, SOLUTION INTRAVENOUS PRN
Status: DISCONTINUED | OUTPATIENT
Start: 2022-11-02 | End: 2022-11-03 | Stop reason: HOSPADM

## 2022-11-02 RX ORDER — CALCIUM GLUCONATE 20 MG/ML
2000 INJECTION, SOLUTION INTRAVENOUS ONCE
Status: COMPLETED | OUTPATIENT
Start: 2022-11-02 | End: 2022-11-02

## 2022-11-02 RX ORDER — MAGNESIUM SULFATE IN WATER 40 MG/ML
2000 INJECTION, SOLUTION INTRAVENOUS ONCE
Status: COMPLETED | OUTPATIENT
Start: 2022-11-02 | End: 2022-11-02

## 2022-11-02 RX ORDER — POTASSIUM CHLORIDE 7.45 MG/ML
10 INJECTION INTRAVENOUS PRN
Status: DISCONTINUED | OUTPATIENT
Start: 2022-11-02 | End: 2022-11-03 | Stop reason: HOSPADM

## 2022-11-02 RX ORDER — POTASSIUM CHLORIDE 20 MEQ/1
40 TABLET, EXTENDED RELEASE ORAL EVERY 4 HOURS
Status: COMPLETED | OUTPATIENT
Start: 2022-11-02 | End: 2022-11-02

## 2022-11-02 RX ORDER — SODIUM CHLORIDE 9 MG/ML
INJECTION, SOLUTION INTRAVENOUS PRN
Status: DISCONTINUED | OUTPATIENT
Start: 2022-11-02 | End: 2022-11-03

## 2022-11-02 RX ADMIN — NORETHINDRONE ACETATE 5 MG: 5 TABLET ORAL at 15:20

## 2022-11-02 RX ADMIN — LEUPROLIDE ACETATE 11.25 MG: KIT at 15:45

## 2022-11-02 RX ADMIN — POTASSIUM CHLORIDE 40 MEQ: 1500 TABLET, EXTENDED RELEASE ORAL at 11:36

## 2022-11-02 RX ADMIN — NORETHINDRONE ACETATE 5 MG: 5 TABLET ORAL at 08:16

## 2022-11-02 RX ADMIN — ATORVASTATIN CALCIUM 80 MG: 80 TABLET, FILM COATED ORAL at 19:50

## 2022-11-02 RX ADMIN — IRON SUCROSE 500 MG: 20 INJECTION, SOLUTION INTRAVENOUS at 12:00

## 2022-11-02 RX ADMIN — PANTOPRAZOLE SODIUM 40 MG: 40 TABLET, DELAYED RELEASE ORAL at 08:16

## 2022-11-02 RX ADMIN — DOCUSATE SODIUM 50 MG AND SENNOSIDES 8.6 MG 2 TABLET: 8.6; 5 TABLET, FILM COATED ORAL at 08:16

## 2022-11-02 RX ADMIN — MAGNESIUM SULFATE HEPTAHYDRATE 2000 MG: 40 INJECTION, SOLUTION INTRAVENOUS at 12:10

## 2022-11-02 RX ADMIN — NORETHINDRONE ACETATE 5 MG: 5 TABLET ORAL at 19:50

## 2022-11-02 RX ADMIN — POTASSIUM CHLORIDE 40 MEQ: 1500 TABLET, EXTENDED RELEASE ORAL at 15:20

## 2022-11-02 RX ADMIN — CALCIUM GLUCONATE 2000 MG: 20 INJECTION, SOLUTION INTRAVENOUS at 16:04

## 2022-11-02 NOTE — PLAN OF CARE
Problem: Discharge Planning  Goal: Discharge to home or other facility with appropriate resources  11/2/2022 1705 by Ayesha Bernheim, RN  Outcome: Progressing  11/2/2022 0547 by Halle Felder RN  Outcome: Progressing     Problem: Safety - Adult  Goal: Free from fall injury  11/2/2022 1705 by Ayesha Bernheim, RN  Outcome: Progressing  11/2/2022 0547 by Halle Felder RN  Outcome: Progressing

## 2022-11-02 NOTE — PLAN OF CARE
Problem: Discharge Planning  Goal: Discharge to home or other facility with appropriate resources  11/2/2022 0547 by Shima Chavez RN  Outcome: Progressing     Problem: Safety - Adult  Goal: Free from fall injury  11/2/2022 0547 by Shima Chavez RN  Outcome: Progressing

## 2022-11-02 NOTE — PROGRESS NOTES
Lab called regarding hemoglobin sample coming back at 8.9, asked if we wanted a re-draw. Consulted resident, resident would not like a re-draw at this time. Will continue to monitor.

## 2022-11-02 NOTE — PROGRESS NOTES
Daily Progress Note  Neuro Critical Care    Patient Name: Stanford Kidney  Patient : 1985  Room/Bed: 0126/0126-01  Code Status: Full code  Allergies: Allergies   Allergen Reactions    Vancomycin Itching     Patient felt like skin was on fire. CHIEF COMPLAINT:      R arm weakness/numbness     INTERVAL HISTORY    Initial Presentation (Admitted 10/31/22): The patient is a 40 y.o. female w/ PMHx significant for hypertension, hyperlipidemia, diabetes who presented to the ER with complaint of right-sided arm numbness as well as slurred speech and right facial droop. Patient states that she was cooking with her fiancé approximately 2 hours ago when he noticed that she was slurring her speech and had a right-sided facial droop. Patient was also complaining of numbness in her arm. At time of initial evaluation in the ER, she stated that her right-sided facial droop and slurred speech had improved she still was having consistent right arm numbness. Denied any fever or chills or blood thinner use. Does have a strong family history of acute CVA. Denies any chest pain, shortness of breath, neck stiffness, blurry vision, double vision, difficulty swallowing, bowel pain, nausea vomiting or diarrhea. CTH negative for any intracranial abnormalities. 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 Given ASA and Plavix in the ER. Transfer[place] to [place] for further monitoring and workup. At time of initial examination, patient was still asymptomatic. AAOx3, GCS 15. No complaints at this time.     Hospital Course:   10/31- Continued vaginal bleeding. OB/GYN was consulted and patient was started on Aygestin and Lupron. Patient status post 3 units PRBC. DSA 22. No further weakness or numbness. - Less vaginal bleeding, another unit pRBC. DSA canceled due to vaginal bleeding and anemia. Arterial line placed L fem. Continuing to work with OB regarding vaginal bleeding. Last 24 hrs- Hgb low again this AM. Pt with some continued improvement in vaginal bleeding. No lightheadedness. Another 1 u pRBC ordered and repeat Hgb 8.9.       CURRENT MEDICATIONS:  SCHEDULED MEDICATIONS:   magnesium sulfate  2,000 mg IntraVENous Once    potassium chloride  40 mEq Oral Q4H    atorvastatin  80 mg Oral Nightly    [Held by provider] clopidogrel  75 mg Oral Daily    [Held by provider] aspirin  81 mg Oral Daily    pantoprazole  40 mg Oral QAM AC    sennosides-docusate sodium  2 tablet Oral Daily    iron sucrose  500 mg IntraVENous Q24H    [START ON 11/3/2022] ferrous sulfate  325 mg Oral BID WC    leuprolide  11.25 mg IntraMUSCular Once    norethindrone  5 mg Oral TID     CONTINUOUS INFUSIONS:   sodium chloride      sodium chloride      sodium chloride      sodium chloride 100 mL/hr at 22 0207    sodium chloride      sodium chloride       PRN MEDICATIONS:   sodium chloride, potassium chloride **OR** potassium alternative oral replacement **OR** potassium chloride, magnesium sulfate, sodium chloride, sodium chloride, acetaminophen, ondansetron **OR** ondansetron, polyethylene glycol, sodium chloride, ketorolac, sodium chloride    VITALS:  Temperature Range: Temp: 98.7 °F (37.1 °C) Temp  Av.2 °F (36.8 °C)  Min: 97.3 °F (36.3 °C)  Max: 98.8 °F (37.1 °C)  BP Range: Systolic (53RTZ), CFN:788 , Min:112 , JML:829     Diastolic (79YIQ), PCN:37, Min:54, Max:86    Pulse Range: Pulse  Av.5  Min: 64  Max: 96  Respiration Range: Resp  Av.3  Min: 13  Max: 28  Current Pulse Ox: SpO2: 100 %  24HR Pulse Ox Range: SpO2  Av.4 %  Min: 90 %  Max: 100 %  Patient Vitals for the past 12 hrs:   BP Temp Temp src Pulse Resp SpO2   11/02/22 1226 112/86 98.7 °F (37.1 °C) Oral 87 24 --   11/02/22 1200 -- -- -- 80 17 100 %   11/02/22 1100 -- -- -- 79 24 100 %   11/02/22 1030 -- -- -- 80 24 100 %   11/02/22 1000 -- -- -- 94 15 99 %   11/02/22 0930 -- -- -- 91 28 96 %   11/02/22 0915 -- -- -- 96 19 99 %   11/02/22 0900 -- -- -- 88 21 99 %   11/02/22 0845 -- -- -- 93 20 99 %   11/02/22 0830 -- -- -- 87 20 99 %   11/02/22 0815 -- -- -- 89 24 98 %   11/02/22 0800 -- 97.6 °F (36.4 °C) Oral 75 25 99 %   11/02/22 0745 -- -- -- 79 23 99 %   11/02/22 0730 -- -- -- 86 22 99 %   11/02/22 0715 (!) 140/68 98.7 °F (37.1 °C) -- 86 17 99 %   11/02/22 0710 (!) 143/73 98.7 °F (37.1 °C) -- 90 17 99 %   11/02/22 0705 (!) 141/69 98.8 °F (37.1 °C) -- 88 18 99 %   11/02/22 0659 135/63 98.7 °F (37.1 °C) -- 92 16 99 %   11/02/22 0630 -- -- -- 81 22 --   11/02/22 0600 (!) 140/63 98 °F (36.7 °C) -- 82 24 --   11/02/22 0530 -- -- -- 84 27 --   11/02/22 0500 -- -- -- 76 25 --   11/02/22 0430 -- -- -- 79 23 --   11/02/22 0400 (!) 130/58 98.1 °F (36.7 °C) -- 75 23 --   11/02/22 0330 -- -- -- 74 25 --   11/02/22 0300 -- -- -- 77 25 --   11/02/22 0230 -- -- -- 72 23 --   11/02/22 0200 (!) 125/55 98.2 °F (36.8 °C) -- 71 23 --   11/02/22 0130 -- -- -- 92 26 --       Estimated body mass index is 50.73 kg/m² as calculated from the following:    Height as of this encounter: 5' 3\" (1.6 m). Weight as of this encounter: 286 lb 6 oz (129.9 kg).  []<16 Severe malnutrition  []16-16.99 Moderate malnutrition  []17-18.49 Mild malnutrition  []18.5-24.9 Normal  []25-29.9 Overweight (not obese)  []30-34.9 Obese class 1 (Low Risk)  []35-39.9 Obese class 2 (Moderate Risk)  [x]? 40 Obese class 3 (High Risk)    RECENT LABS:   Lab Results   Component Value Date    WBC 9.7 11/02/2022    HGB 8.9 (L) 11/02/2022    HCT 26.9 (L) 11/02/2022     11/02/2022    CHOL 148 10/31/2022    TRIG 66 10/31/2022    HDL 48 10/31/2022 ALT 8 11/01/2022    AST 18 11/01/2022     11/02/2022    K 3.2 (L) 11/02/2022     (H) 11/02/2022    CREATININE 0.42 (L) 11/02/2022    BUN 10 11/02/2022    CO2 18 (L) 11/02/2022    TSH 2.12 09/12/2022    INR 1.1 10/30/2022    LABA1C 5.2 10/31/2022     24 HOUR INTAKE/OUTPUT:  Intake/Output Summary (Last 24 hours) at 11/2/2022 1310  Last data filed at 11/1/2022 2200  Gross per 24 hour   Intake 315 ml   Output --   Net 315 ml         IMAGING:   MRI brain without contrast   Final Result   No acute infarct, hemorrhage or intracranial mass effect. Sequelae of moyamoya, as detailed above. Labs and Images reviewed with:  [] Dr. Mauricio Jones. Ab    [x] Dr. Arcelia Dakins  [] Dr. Richard Vieyra  [] There are no new interval images to review. PHYSICAL EXAM       CONSTITUTIONAL:  Well developed, well nourished, alert and oriented x 3, in no acute distress. GCS 15. Nontoxic. No dysarthria. No aphasia. HEAD:  normocephalic, atraumatic    EYES:  PERRLA, EOMI.   ENT:  moist mucous membranes   NECK:  supple, symmetric   LUNGS:  Equal air entry bilaterally   CARDIOVASCULAR:  normal s1 / s2, RRR, distal pulses intact   ABDOMEN:  Soft, no rigidity   NEUROLOGIC:  Mental Status:  A & O x3,awake             Cranial Nerves:    cranial nerves II-XII are grossly intact    Motor Exam:    Drift:  absent  Tone:  normal    Motor exam is symmetrical 5 out of 5 all extremities bilaterally    Sensory:    Touch:    Right Upper Extremity:  normal  Left Upper Extremity:  normal  Right Lower Extremity:  normal  Left Lower Extremity:  normal         DRAINS:  [x] There are no drains for Neuro Critical Care to monitor at this time. ASSESSMENT AND PLAN:       69-year-old female, history of pretension, HLD, DM admitted for valuation of possible moyamoya now with vaginal bleeding for which OB/GYN has been consulted.   Patient was found to have occlusion in the proximal M1 segment of the right MCA and at origin of the A1 segment of the right NEREIDA with prominent collateral vessels. Currently treating anemia with pRBC transfusions and waiting DSA by Oliver Hernández. 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   - MRI nonacute  - Plavix, ASA held per NEV due to vaginal bleeding  - AEDs not indicated  - Goal -180  - DSA outpatient vs once vaginal bleeding controlled     CARDIOVASCULAR:  - Goal -180  - Cont home metoprolol   - 5.2 A1c  - Lipitor 80mg daily     PULMONARY:  - No acute respiratory concerns at this time     RENAL/FLUID/ELECTROLYTE:  - BUN 9/ Creatinine 0.77  - I/O: No intake/output data recorded. - IVF: 100ml/hr while NPO  - Hypocalcemia 7.0, hypomagnesemia 1.5, hypokalemia 3.2- replacing electrolytes PRN     GI/NUTRITION:  NUTRITION:  Diet Regular  - Bowel regimen: glycolax, Zofran PRN  - GI prophylaxis: cont home PPI     ID:  - Afebrile  - Leukocytosis improved, WBC 11/2 9.7  - F/u UA    HEME:  - Anemia likely 2/2 vaginal bleeding, fibroids  - OB/Gyn consulted, appreciated recommendations  - Hgb 8.9 s/p 4 units pRBC  - Aygestin 5mg TID, Lupron per OB recs  - Venofer day 3/3 and ferrous sulfate 325mg BID       OTHER:  - PT/OT  - Code Status: Full Code     PROPHYLAXIS:  Stress ulcer: continue home PPI     DVT PROPHYLAXIS:  - SCD sleeves - Thigh High     DISPOSITION:  [x] To remain ICU: Awaiting DSA, management of vaginal bleeding  [] OK for out of ICU from Neuro Critical Care standpoint    We will continue to follow along. For any changes in exam or patient status please contact Neuro Critical Care. Lakesha Camarena MD  PGY 2  Neuro Critical Care  11/2/2022     1:10 PM

## 2022-11-02 NOTE — CARE COORDINATION
[address] Flow/Interdisciplinary Rounds Progress Note    Quality Flow Rounds held on November 2, 2022 at [address] Attending:  Bedside Nurse, , and Nursing Unit Leadership    Barriers to Discharge: HGB    Anticipated Discharge Date:       Anticipated Discharge Disposition:    Readmission Risk              Risk of Unplanned Readmission:  10           Discussed patient goal for the day, patient clinical progression, and barriers to discharge.   The following Goal(s) of the Day/Commitment(s) have been identified:  Activity Progression  Endovacular and OBGYN following, Pt with vaginal bleeding, HGB  6.5 today, plan is to transfuse, Endovascular holding off DSA until pt stable \"possible outpt\", daily labs, monitor for [facility], [facility]. needs      Álvaro Hyde RN  November 2, 2022

## 2022-11-02 NOTE — PROGRESS NOTES
Endovascular Neurosurgery Consult      Reason for evaluation: [place] disease     SUBJECTIVE:     Overnight:    No complaint, still bleeding from her period, received  another 1 unit of PRBC    History of Chief Complaint:      Ms Ginger Ruth is a 41 y/o F with pmh significant for obesity, Htn presented with transient episode of slurred speech, right facial numbness and right arm weakness and numbness. Patient also had some headache developed after onset of symptoms. Symptoms resolved in 3-4 hrs. CTA head and neck showed b/l ICA intracranial severe stenosis with [place] like vasculopathy, therefore endovascular team was consulted. Patient denied any headache, weakness or numbness today. Review of Systems:  CONSTITUTIONAL:  negative for fevers, chills, fatigue and malaise    EYES:  negative for double vision, blurred vision and photophobia     HEENT:  negative for tinnitus, epistaxis and sore throat    RESPIRATORY:  negative for cough, shortness of breath, wheezing    CARDIOVASCULAR:  negative for chest pain, palpitations, syncope, edema    GASTROINTESTINAL:  negative for nausea, vomiting    GENITOURINARY:  negative for incontinence    MUSCULOSKELETAL:  negative for neck or back pain    NEUROLOGICAL:  Negative for weakness and tingling  negative for headaches and dizziness    PSYCHIATRIC:  negative for anxiety      Review of systems otherwise negative. OBJECTIVE:     Vitals:    11/02/22 0659   BP: 135/63   Pulse: 92   Resp: 16   Temp: 98.7 °F (37.1 °C)   SpO2: 99%        General:  Gen: normal habitus, NAD  HEENT: NCAT, mucosa moist  Cvs: RRR, S1 S2 normal  Resp: symmetric unlabored breathing  Abd: s/nd/nt  Ext: no edema  Skin: no lesions seen, warm and dry    Neuro:  Gen: awake and alert, oriented x4. Lang/speech: no aphasia or dysarthria. Follows commands.   CN: PERRL, EOMI, VFF, V1-3 intact, face symmetric, hearing intact, shoulder shrug symmetric, tongue midline  Motor: grossly 5/5 UE and LE b/l  Sense: LT intact in all 4 ext. Coord: FTN and HTS intact b/l  DTR: deferred  Gait: narrow base gait    NIH Stroke Scale:   1a  Level of consciousness: 0 - alert; keenly responsive   1b. LOC questions:  0 - answers both questions correctly   1c. LOC commands: 0 - performs both tasks correctly   2. Best Gaze: 0 - normal   3. Visual: 0 - no visual loss   4. Facial Palsy: 0 - normal symmetric movement   5a. Motor left arm: 0 - no drift, limb holds 90 (or 45) degrees for full 10 seconds   5b. Motor right arm: 0 - no drift, limb holds 90 (or 45) degrees for full 10 seconds   6a. Motor left le - no drift; leg holds 30 degree position for full 5 seconds   6b  Motor right le - no drift; leg holds 30 degree position for full 5 seconds   7. Limb Ataxia: 0 - absent   8. Sensory: 0 - normal; no sensory loss   9. Best Language:  0 - no aphasia, normal   10. Dysarthria: 0 - normal   11. Extinction and Inattention: 0 - no abnormality         Total:   0     MRS: 0      LABS:   Reviewed. Lab Results   Component Value Date    HGB 6.5 (LL) 2022    WBC 9.7 2022     2022     2022    BUN 10 2022    CREATININE 0.42 (L) 2022    AST 18 2022    ALT 8 2022    MG 1.5 (L) 2022    APTT 28.2 10/30/2022    INR 1.1 10/30/2022      Lab Results   Component Value Date/Time    COVID19 Not Detected 2021 01:24 PM       RADIOLOGY:   Images were personally reviewed including:  CTA head and neck:   Severe stenosis in the distal supraclinoid segment of the right internal   carotid artery, with occlusion in the proximal M1 segment of the right MCA   and at origin of A1 segment of the right NEREIDA, with prominent collateral   vessels, 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 No acute abnormality or flow-limiting stenosis of the major arteries of the   neck. MRI brain:   No acute infarct, hemorrhage or intracranial mass effect. Sequelae of moyamoya, as detailed above. ASSESSMENT:     39 y/o F with pmh significant for obesity, Htn presented with transient episode of slurred speech, right facial numbness and right arm weakness and numbness. 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 MRI brain No strokes/hemorrhage. Patient was recommended to have DSA to evaluate for [place] and assess collateral flow. Anemia: Hb was 6.5     Risks and benefits discussed, risks include but are not limited to bruising, stroke, subarachnoid hemorrhage, death, retroperitoneal hematoma, pseudoaneurysm, lower extremity/renal/peripheral vascular compromise, informed consent obtained. PLAN:   --holding off DSA until patient is stable, this can also be done outpatient  -- moyamoya is a chronic disease, patient has no stroke on MRI, there is no immediate treatment needed, stabilizing her bleeding is of utmost priority, in fact decrease hemoglobin can cause her to have strokes or increase stroke risk due to insufficient perfusion  --Anemia: blood transfusion per ICU team     --hold aspirin and plavix for now in light of her active bleeding. Case discussed with Dr. Abarca Filter attending.     Rene Mosley MD    Stroke, [facility]  [id number]  Electronically signed 11/2/2022 at 7:00 AM

## 2022-11-02 NOTE — PROGRESS NOTES
OB/GYN RESIDENT INTERVAL NOTE    Patient had previously been ordered Lupron to help with vaginal bleeding. Patient seen resting comfortably in bed. Addressed all patient questions. Lupron injection given to patient in right gluteal area. Patient tolerated injection well. Will continue to follow vaginal bleeding, and plan for long term treatment of vaginal bleeding outpatient. Dr. Glenna Mayfield in room for entire procedure.        Vitals:    11/02/22 1200 11/02/22 1226 11/02/22 1300 11/02/22 1400   BP:  112/86 (!) 149/67 (!) 158/76   Pulse: 80 87 74 75   Resp: 17 24 16 23   Temp:  98.7 °F (37.1 °C)     TempSrc:  Oral     SpO2: 100%  100%    Weight:       Height:             Recent Results (from the past 12 hour(s))   CBC    Collection Time: 11/02/22  5:39 AM   Result Value Ref Range    WBC 9.7 3.5 - 11.3 k/uL    RBC 2.47 (L) 3.95 - 5.11 m/uL    Hemoglobin 6.5 (LL) 11.9 - 15.1 g/dL    Hematocrit 20.6 (L) 36.3 - 47.1 %    MCV 83.4 82.6 - 102.9 fL    MCH 26.3 25.2 - 33.5 pg    MCHC 31.6 28.4 - 34.8 g/dL    RDW 15.5 (H) 11.8 - 14.4 %    Platelets 446 742 - 185 k/uL    MPV 10.2 8.1 - 13.5 fL    NRBC Automated 0.2 (H) 0.0 per 100 WBC   Basic Metabolic Panel w/ Reflex to MG    Collection Time: 11/02/22  5:39 AM   Result Value Ref Range    Glucose 78 70 - 99 mg/dL    BUN 10 6 - 20 mg/dL    Creatinine 0.42 (L) 0.50 - 0.90 mg/dL    Est, Glom Filt Rate >60 >60 mL/min/1.73m2    Calcium 7.0 (L) 8.6 - 10.4 mg/dL    Sodium 141 135 - 144 mmol/L    Potassium 3.2 (L) 3.7 - 5.3 mmol/L    Chloride 114 (H) 98 - 107 mmol/L    CO2 18 (L) 20 - 31 mmol/L    Anion Gap 9 9 - 17 mmol/L   Magnesium    Collection Time: 11/02/22  5:39 AM   Result Value Ref Range    Magnesium 1.5 (L) 1.6 - 2.6 mg/dL   Hemoglobin and Hematocrit    Collection Time: 11/02/22 12:20 PM   Result Value Ref Range    Hemoglobin 8.9 (L) 11.9 - 15.1 g/dL    Hematocrit 26.9 (L) 36.3 - 47.1 %   SPECIMEN REJECTION    Collection Time: 11/02/22  1:31 PM   Result Value Ref Range Specimen Source . BLOOD     Ordered Test IOCAL     Reason for Rejection       Unable to perform testing: Specimen quantity not sufficient. Plan discussed with senior resident and attending.         Gaurav Rai DO  OB/GYN Resident  [facility]  11/2/2022 4:09 PM       Attending Attestation:   Was present for injection     Rafal Arnold, [facility] OB/GYN  Date: 11/2/2022  Time: 4:11 PM

## 2022-11-02 NOTE — PROGRESS NOTES
Gynecology Progress Note    Date: 11/3/2022  Time: 6:26 AM    Cinthya Pringle is a 40 y.o. female  HD# 4    Patient was seen and examined. She states that her bleeding has significantly decreased. She had very minimal bleeding this morning around 3am when she got up to use the restroom. . Pain is  controlled. Patient is  tolerating oral intake. She is urinating well . She is  ambulating without difficulty. She is  passing flatus. She denies Fever/Chills, Chest Pain, SOB, N/V, Calf Pain    Vitals:  Vitals:    22 0300 22 0400 22 0505 22 0600   BP: 131/68 125/64 133/84 (!) 103/90   Pulse: 81 83 90 73   Resp: 16 20 24 16   Temp:  97.8 °F (36.6 °C)  98 °F (36.7 °C)   TempSrc:  Oral  Oral   SpO2: 97% 98% 98% 97%   Weight:       Height:             Intake/Output:   Last Shift: I/O last 3 completed shifts: In: 315 [Blood:315]  Out: -   Current Shift: No intake/output data recorded. Physical Exam:  General:  no apparent distress, alert, and cooperative  Neurologic:  alert, oriented, normal speech, no focal findings or movement disorder noted  Lungs:  No increased work of breathing, good air exchange, clear to auscultation bilaterally, no crackles or wheezing  Heart:  regular rate and rhythm and no murmur    Abdomen: Abdomen soft, non-tender. BS normal. No masses,  No organomegaly  Extremities:  no calf tenderness, non edematous    Lab:  Complete Blood Count:   Recent Labs     22  1745 22  2215 22  0539 22  1220 22  0438   WBC 11.8*  --  9.7  --  11.2   HGB 6.7*   < > 6.5* 8.9* 8.6*   HCT 21.4*   < > 20.6* 26.9* 26.4*     --  208  --  266    < > = values in this interval not displayed.         PT/INR:    Lab Results   Component Value Date/Time    PROTIME 14.4 10/30/2022 10:00 PM    INR 1.1 10/30/2022 10:00 PM     PTT:    Lab Results   Component Value Date/Time    APTT 28.2 10/30/2022 10:00 PM       Comprehensive Metabolic Profile:   Recent Labs 22  0215 22  0725 22  1745 22  0539 22  0438      < > 133* 141 137   K 3.8   < > 3.7 3.2* 4.0   *   < > 105 114* 110*   CO2 20   < > 20 18* 18*   BUN 9   < > 11 10 7   CREATININE 0.52   < > 0.53 0.42* 0.58   GLUCOSE 125*   < > 85 78 100*   CALCIUM 7.8*   < > 8.0* 7.0* 8.3*   PROT 5.3*  --   --   --   --    LABALBU 2.9*  --   --   --   --    BILITOT 0.4  --   --   --   --    ALKPHOS 42  --   --   --   --    AST 18  --   --   --   --    ALT 8  --   --   --   --     < > = values in this interval not displayed. Assessment/Plan:  Vance Caballero 40 y.o. female  HD# 4 AUB 2/2 Uterine Fibroids   - Patient states that her bleeding is minimal   - Doing well, vitals stable   - Pelvic exam is deferred, abdominal exam benign   - Labs this morning: CBC, BMP    - Hg 8.6    - S/p 5 uPRBC   - Continue Aygestin   - Lupron given 22  - Plavix and ASA held per primary team 2/2 vaginal bleeding   - Venofer x 3 doses (10/31-)   - NS @ 75    - K/Mg replacement   - Ob/Gyn will continue to follow with goals of stabilizing patient for discharge with ultimate plan of hysterectomy outpatient. Continue to follow Hg and transfuse if below 7.      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     Priority: Medium    Numbness and tingling 10/31/2022     Priority: Medium    CLARISSA (iron deficiency anemia) 2022     Priority: Medium    Morbid obesity with BMI of 50.0-59.9, adult ([address] 2022     Priority: Medium    Abnormal uterine bleeding 10/31/2022     Overview Note:     AUB-L      Atypical glandular cells on cervical Pap smear 2022     Overview Note:      - needs colp and EMB      S/P laparoscopic sleeve gastrectomy 2022    Esophageal dysphagia     Antral gastritis     IUD migration 2021    Vitamin D deficiency 06/02/2021    Essential hypertension 02/06/2019    Hyperlipidemia 05/08/2016    Pre-diabetes 05/08/2016         Bob Silva DO  Ob/Gyn Resident   11/3/2022, 6:26 AM         Attending Physician Statement  Pt improving from Ob/GYN standpoint and stable for discharge whenever admitting team feels it is appropriate. Pt received lupron injection yesterday and bleeding has improved with that and aygestin. Will plan for hysterectomy outpatient when neurology feels is is safe for her to take a break from her anticoagulation for surgery. Pt to follow with me in the office to discuss further planning. I have discussed the care of Cinthya Pringle, including pertinent history and exam findings,  with the resident. I have reviewed the key elements of all parts of the encounter with the resident. I agree with the assessment, plan and orders as documented by the resident.   (Hema Loots)    Jaiden Wen MD

## 2022-11-03 VITALS
SYSTOLIC BLOOD PRESSURE: 144 MMHG | HEART RATE: 84 BPM | OXYGEN SATURATION: 100 % | BODY MASS INDEX: 50.74 KG/M2 | TEMPERATURE: 98 F | HEIGHT: 63 IN | WEIGHT: 286.38 LBS | RESPIRATION RATE: 29 BRPM | DIASTOLIC BLOOD PRESSURE: 67 MMHG

## 2022-11-03 LAB
ABO/RH: NORMAL
ABSOLUTE EOS #: 0.05 K/UL (ref 0–0.44)
ABSOLUTE IMMATURE GRANULOCYTE: 0.07 K/UL (ref 0–0.3)
ABSOLUTE LYMPH #: 2.63 K/UL (ref 1.1–3.7)
ABSOLUTE MONO #: 1.1 K/UL (ref 0.1–1.2)
ANION GAP SERPL CALCULATED.3IONS-SCNC: 9 MMOL/L (ref 9–17)
ANTIBODY SCREEN: NEGATIVE
ARM BAND NUMBER: NORMAL
BASOPHILS # BLD: 0 % (ref 0–2)
BASOPHILS ABSOLUTE: 0.05 K/UL (ref 0–0.2)
BLD PROD TYP BPU: NORMAL
BLOOD BANK BLOOD PRODUCT EXPIRATION DATE: NORMAL
BLOOD BANK ISBT PRODUCT BLOOD TYPE: 5100
BLOOD BANK PRODUCT CODE: NORMAL
BLOOD BANK UNIT TYPE AND RH: NORMAL
BPU ID: NORMAL
BUN BLDV-MCNC: 7 MG/DL (ref 6–20)
CALCIUM SERPL-MCNC: 8.3 MG/DL (ref 8.6–10.4)
CHLORIDE BLD-SCNC: 110 MMOL/L (ref 98–107)
CO2: 18 MMOL/L (ref 20–31)
CREAT SERPL-MCNC: 0.58 MG/DL (ref 0.5–0.9)
CROSSMATCH RESULT: NORMAL
CULTURE: ABNORMAL
CULTURE: ABNORMAL
DISPENSE STATUS BLOOD BANK: NORMAL
EOSINOPHILS RELATIVE PERCENT: 0 % (ref 1–4)
EXPIRATION DATE: NORMAL
GFR SERPL CREATININE-BSD FRML MDRD: >60 ML/MIN/1.73M2
GLUCOSE BLD-MCNC: 100 MG/DL (ref 70–99)
HCT VFR BLD CALC: 26.4 % (ref 36.3–47.1)
HEMOGLOBIN: 8.6 G/DL (ref 11.9–15.1)
IMMATURE GRANULOCYTES: 1 %
LYMPHOCYTES # BLD: 24 % (ref 24–43)
MCH RBC QN AUTO: 26.5 PG (ref 25.2–33.5)
MCHC RBC AUTO-ENTMCNC: 32.6 G/DL (ref 28.4–34.8)
MCV RBC AUTO: 81.5 FL (ref 82.6–102.9)
MONOCYTES # BLD: 10 % (ref 3–12)
NRBC AUTOMATED: 0.4 PER 100 WBC
PDW BLD-RTO: 15.9 % (ref 11.8–14.4)
PLATELET # BLD: 266 K/UL (ref 138–453)
PMV BLD AUTO: 10.1 FL (ref 8.1–13.5)
POTASSIUM SERPL-SCNC: 4 MMOL/L (ref 3.7–5.3)
RBC # BLD: 3.24 M/UL (ref 3.95–5.11)
RBC # BLD: ABNORMAL 10*6/UL
SEG NEUTROPHILS: 65 % (ref 36–65)
SEGMENTED NEUTROPHILS ABSOLUTE COUNT: 7.29 K/UL (ref 1.5–8.1)
SODIUM BLD-SCNC: 137 MMOL/L (ref 135–144)
SPECIMEN DESCRIPTION: ABNORMAL
TRANSFUSION STATUS: NORMAL
UNIT DIVISION: 0
UNIT ISSUE DATE/TIME: NORMAL
WBC # BLD: 11.2 K/UL (ref 3.5–11.3)

## 2022-11-03 PROCEDURE — 6370000000 HC RX 637 (ALT 250 FOR IP): Performed by: HEALTH CARE PROVIDER

## 2022-11-03 PROCEDURE — 94761 N-INVAS EAR/PLS OXIMETRY MLT: CPT

## 2022-11-03 PROCEDURE — 99232 SBSQ HOSP IP/OBS MODERATE 35: CPT | Performed by: PSYCHIATRY & NEUROLOGY

## 2022-11-03 PROCEDURE — 6370000000 HC RX 637 (ALT 250 FOR IP): Performed by: NURSE PRACTITIONER

## 2022-11-03 PROCEDURE — 99233 SBSQ HOSP IP/OBS HIGH 50: CPT | Performed by: PSYCHIATRY & NEUROLOGY

## 2022-11-03 PROCEDURE — 80048 BASIC METABOLIC PNL TOTAL CA: CPT

## 2022-11-03 PROCEDURE — 85025 COMPLETE CBC W/AUTO DIFF WBC: CPT

## 2022-11-03 PROCEDURE — 6370000000 HC RX 637 (ALT 250 FOR IP): Performed by: STUDENT IN AN ORGANIZED HEALTH CARE EDUCATION/TRAINING PROGRAM

## 2022-11-03 PROCEDURE — 36415 COLL VENOUS BLD VENIPUNCTURE: CPT

## 2022-11-03 RX ORDER — CEPHALEXIN 500 MG/1
500 CAPSULE ORAL EVERY 12 HOURS SCHEDULED
Status: DISCONTINUED | OUTPATIENT
Start: 2022-11-03 | End: 2022-11-03 | Stop reason: HOSPADM

## 2022-11-03 RX ORDER — ATORVASTATIN CALCIUM 80 MG/1
80 TABLET, FILM COATED ORAL NIGHTLY
Qty: 30 TABLET | Refills: 1 | Status: SHIPPED | OUTPATIENT
Start: 2022-11-03

## 2022-11-03 RX ORDER — ASPIRIN 81 MG/1
81 TABLET ORAL DAILY
Qty: 30 TABLET | Refills: 1 | Status: SHIPPED | OUTPATIENT
Start: 2022-11-03

## 2022-11-03 RX ORDER — ATORVASTATIN CALCIUM 80 MG/1
80 TABLET, FILM COATED ORAL NIGHTLY
Qty: 30 TABLET | Refills: 1 | Status: SHIPPED | OUTPATIENT
Start: 2022-11-03 | End: 2022-11-03 | Stop reason: SDUPTHER

## 2022-11-03 RX ORDER — CEPHALEXIN 500 MG/1
500 CAPSULE ORAL EVERY 12 HOURS SCHEDULED
Qty: 9 CAPSULE | Refills: 0 | Status: SHIPPED | OUTPATIENT
Start: 2022-11-03 | End: 2022-11-08

## 2022-11-03 RX ORDER — ASPIRIN 81 MG/1
81 TABLET ORAL DAILY
Qty: 30 TABLET | Refills: 1 | Status: SHIPPED | OUTPATIENT
Start: 2022-11-03 | End: 2022-11-03 | Stop reason: SDUPTHER

## 2022-11-03 RX ORDER — CEPHALEXIN 500 MG/1
500 CAPSULE ORAL EVERY 12 HOURS SCHEDULED
Qty: 9 CAPSULE | Refills: 0 | Status: SHIPPED | OUTPATIENT
Start: 2022-11-03 | End: 2022-11-03 | Stop reason: SDUPTHER

## 2022-11-03 RX ADMIN — CEPHALEXIN 500 MG: 500 CAPSULE ORAL at 09:46

## 2022-11-03 RX ADMIN — DOCUSATE SODIUM 50 MG AND SENNOSIDES 8.6 MG 2 TABLET: 8.6; 5 TABLET, FILM COATED ORAL at 08:37

## 2022-11-03 RX ADMIN — FERROUS SULFATE TAB EC 325 MG (65 MG FE EQUIVALENT) 325 MG: 325 (65 FE) TABLET DELAYED RESPONSE at 08:37

## 2022-11-03 RX ADMIN — PANTOPRAZOLE SODIUM 40 MG: 40 TABLET, DELAYED RELEASE ORAL at 08:37

## 2022-11-03 RX ADMIN — NORETHINDRONE ACETATE 5 MG: 5 TABLET ORAL at 08:37

## 2022-11-03 NOTE — DISCHARGE SUMMARY
Neuro Critical Care   Discharge Summary      PATIENT NAME: Starla Matias  YOB: 1985  MEDICAL RECORD NO. 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 yo female who presented to [facility] on 10/31/2022  3:34 AM. She has a PMHx significant for hypertension, hyperlipidemia, diabetes who presented to the ER with complaint of right-sided arm numbness as well as slurred speech and right facial droop. Patient states that she was cooking with her fiancé approximately 2 hours ago when he noticed that she was slurring her speech and had a right-sided facial droop. Patient was also complaining of numbness in her arm. At time of initial evaluation in the ER, she stated that her right-sided facial droop and slurred speech had improved she still was having consistent right arm numbness. Denied any fever or chills or blood thinner use. Does have a strong family history of acute CVA. Denies any chest pain, shortness of breath, neck stiffness, blurry vision, double vision, difficulty swallowing, bowel pain, nausea vomiting or diarrhea. CTH negative for any intracranial abnormalities.   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 Given ASA and Plavix in the ER. Transferred to [facility] neuro ICU for further monitoring and workup. At time of initial examination, patient was still asymptomatic. AAOx3, GCS 15. No complaints at this time. Labs and imaging were followed daily. At time of discharge, Stanford Holloway was tolerating a ADULT DIET; Regular, having bowel movements, and is in stable condition to be discharged to home. 10/31: Continued vaginal bleeding. OB/GYN was consulted and patient was started on Aygestin and Lupron. Patient status post 3 units PRBC. DSA 11/1/22. No further weakness or numbness. 11/1: Less vaginal bleeding, another unit pRBC. DSA canceled due to vaginal bleeding and anemia. Arterial line placed L fem. Continuing to work with OB regarding vaginal bleeding. 11/2: Hgb low again this AM. Pt with some continued improvement in vaginal bleeding. No lightheadedness. Another 1 u pRBC ordered and repeat Hgb 8.9. Lupron injection given. 11/3: No acute events overnight. Given Lupron injection yesterday by OB. Bleeding has significantly decreased. Pain is much more controlled at this time. Urinalysis was weakly positive for UTI. Discussed with patient, denies any symptoms that this time but dose have a history of urinary tract infections and normally is asymptomatic. Will treat with 5 day course of Keflex. PROCEDURES:    N/A    PHYSICAL EXAMINATION        Discharge Vitals:  height is 5' 3\" (1.6 m) and weight is 286 lb 6 oz (129.9 kg). Her temperature is 98 °F (36.7 °C) (pended). Her blood pressure is 144/67 (abnormal) and her pulse is 84. Her respiration is 29 and oxygen saturation is 100%.      CONSTITUTIONAL:  Well developed, well nourished, alert and oriented x 3, in no acute distress. GCS 15. Nontoxic. No dysarthria. No aphasia. HEAD:  normocephalic, atraumatic    EYES:  PERRLA, EOMI.   ENT:  moist mucous membranes   NECK:  supple, symmetric   LUNGS:  Equal air entry bilaterally   CARDIOVASCULAR:  normal s1 / s2, RRR, distal pulses intact   ABDOMEN:  Soft, no rigidity   NEUROLOGIC:  Mental Status:  A & O x3,awake             Cranial Nerves:    cranial nerves II-XII are grossly intact     Motor Exam:    Drift:  absent  Tone:  normal     Motor exam is symmetrical 5 out of 5 all extremities bilaterally     Sensory:    Touch:    Right Upper Extremity:  normal  Left Upper Extremity:  normal  Right Lower Extremity:  normal  Left Lower Extremity:  normal   NIH Stroke Scale Total (if not done complete detailed one below):    1a.  Level of consciousness:  0 - alert; keenly responsive  1b. Level of consciousness questions:  0 - answers both questions correctly  1c. Level of consciousness questions:  0 - performs both tasks correctly  2. Best Gaze:  0 - normal  3. Visual:  0 - no visual loss  4. Facial Palsy:  0 - normal symmetric movement  5a. Motor left arm:  0 - no drift, limb holds 90 (or 45) degrees for full 10 seconds  5b. Motor right arm:  0 - no drift, limb holds 90 (or 45) degrees for full 10 seconds  6a. Motor left le - no drift; leg holds 30 degree position for full 5 seconds  6b. Motor right le - no drift; leg holds 30 degree position for full 5 seconds  7. Limb Ataxia:  0 - absent  8. Sensory:  0 - normal; no sensory loss  9. Best Language:  0 - no aphasia, normal  10. Dysarthria:  0 - normal  11.   Extinction and Inattention:  0 - no abnormality  TOTAL:       LABS/IMAGING     Recent Labs     22  1745 22  2215 22  0539 22  1220 22  0438   WBC 11.8*  --  9.7  --  11.2   HGB 6.7*   < > 6.5* 8.9* 8.6*   HCT 21.4*   < > 20.6* 26.9* 26.4*     --  208  -- 266   *  --  141  --  137   K 3.7  --  3.2*  --  4.0     --  114*  --  110*   CO2 20  --  18*  --  18*   BUN 11  --  10  --  7   CREATININE 0.53  --  0.42*  --  0.58    < > = values in this interval not displayed. CT HEAD WO CONTRAST    Addendum Date: 10/30/2022    ADDENDUM: Results were reported to Dr. Marlene Quiroz via radiology results communication at 10:20 p.m. on October 30, 2022. Result Date: 10/30/2022  EXAMINATION: CT OF THE HEAD WITHOUT CONTRAST  10/30/2022 10:07 pm TECHNIQUE: CT of the head was performed without the administration of intravenous contrast. Automated exposure control, iterative reconstruction, and/or weight based adjustment of the mA/kV was utilized to reduce the radiation dose to as low as reasonably achievable. COMPARISON: None. HISTORY: ORDERING SYSTEM PROVIDED HISTORY: right arm numbness TECHNOLOGIST PROVIDED HISTORY: right arm numbness Decision Support Exception - unselect if not a suspected or confirmed emergency medical condition->Emergency Medical Condition (MA) Is the patient pregnant?->No Reason for Exam: right arm numbness Additional signs and symptoms: numbness aphasia FINDINGS: BRAIN/VENTRICLES: There is no acute intracranial hemorrhage, mass effect or midline shift. No abnormal extra-axial fluid collection. The gray-white differentiation is maintained without evidence of an acute infarct. There is no evidence of hydrocephalus. ORBITS: The visualized portion of the orbits demonstrate no acute abnormality. SINUSES: The visualized paranasal sinuses and mastoid air cells demonstrate no acute abnormality. SOFT TISSUES/SKULL:  No acute abnormality of the visualized skull or soft tissues. No acute intracranial abnormality.      CTA HEAD NECK W CONTRAST    Result Date: 10/30/2022  EXAMINATION: CTA OF THE HEAD AND NECK WITH CONTRAST 10/30/2022 10:10 pm: TECHNIQUE: CTA of the head and neck was performed with the administration of intravenous contrast. Multiplanar reformatted images are provided for review. MIP images are provided for review. Stenosis of the internal carotid arteries measured using NASCET criteria. Automated exposure control, iterative reconstruction, and/or weight based adjustment of the mA/kV was utilized to reduce the radiation dose to as low as reasonably achievable. COMPARISON: Noncontrast CT head from earlier today HISTORY: ORDERING SYSTEM PROVIDED HISTORY: right arm numbenss TECHNOLOGIST PROVIDED HISTORY: right arm numbenss Decision Support Exception - unselect if not a suspected or confirmed emergency medical condition->Emergency Medical Condition (MA) Reason for Exam: right arm numbenss Additional signs and symptoms: Numbness; Aphasia FINDINGS: CTA NECK: AORTIC ARCH/ARCH VESSELS: No dissection or arterial injury. No significant stenosis of the brachiocephalic or subclavian arteries. CAROTID ARTERIES: No dissection, arterial injury, or hemodynamically significant stenosis by NASCET criteria. VERTEBRAL ARTERIES: No dissection, arterial injury, or significant stenosis. SOFT TISSUES: The lung apices are clear. No cervical or superior mediastinal lymphadenopathy. The larynx and pharynx are unremarkable. No acute abnormality of the salivary and thyroid glands. BONES: No acute osseous abnormality. CTA HEAD: ANTERIOR CIRCULATION: There is 50% stenosis in the cavernous segment of the right internal carotid artery with moderate calcified plaque. There is severe stenosis in the distal supraclinoid segment of the right internal carotid artery, with occlusion in the proximal M1 segment of the right MCA and at origin of A1 segment of the right NEREIDA, with prominent collateral vessels, 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 There is hypoplastic and M1 segment of the left MCA with collateral vessels, 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 No significant stenosis of the left intracranial internal carotid, and the remainder of the bilateral anterior cerebral arteries. No aneurysm. POSTERIOR CIRCULATION: No significant stenosis of the vertebral, basilar, or posterior cerebral arteries. No aneurysm. OTHER: No dural venous sinus thrombosis on this non-dedicated study. BRAIN: No mass effect or midline shift. No extra-axial fluid collection. The gray-white differentiation is maintained. Severe stenosis in the distal supraclinoid segment of the right internal carotid artery, with occlusion in the proximal M1 segment of the right MCA and at origin of A1 segment of the right NEREIDA, with prominent collateral vessels, 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 No acute abnormality or flow-limiting stenosis of the major arteries of the neck. Results were reported to Dr. Que Gonzalez at 10:26 p.m. on October 30, 2022. MRI brain without contrast    Result Date: 10/31/2022  EXAMINATION: MRI OF THE BRAIN WITHOUT CONTRAST  10/31/2022 1:13 pm TECHNIQUE: Multiplanar multisequence MRI of the brain was performed without the administration of intravenous contrast. COMPARISON: Head CT 10/30/2022 HISTORY: ORDERING SYSTEM PROVIDED HISTORY: stroke, wild-wild FINDINGS: INTRACRANIAL STRUCTURES/VENTRICLES: No evidence of an acute infarct or other acute parenchymal process. No evidence of acute intracranial hemorrhage. There is no evidence of an intracranial mass or extraaxial fluid collection. No significant mass effect or midline shift. There is sulcal FLAIR signal abnormality within the right hemisphere compatible with slow flowing engorged pial vessels compatible with the \"Ivy sign\" in the setting of moyamoya. There is no significant volume loss. The ventricles are within normal limits of size and configuration for age. The sellar/suprasellar regions appear unremarkable. The normal signal voids within the major dural venous sinuses appear maintained.   Occlusion of the distal carotid termini and proximal MCAs, better evaluated on the recent CTA head compatible with moyamoya. ORBITS: The visualized portion of the orbits demonstrate no acute abnormality. SINUSES: The visualized paranasal sinuses and mastoid air cells are well aerated. BONES/SOFT TISSUES: The bone marrow signal intensity appears normal. The soft tissues demonstrate no acute abnormality. No acute infarct, hemorrhage or intracranial mass effect. Sequelae of moyamoya, as detailed above.          DISCHARGE INSTRUCTIONS     Discharge Medications:        Medication List        START taking these medications      aspirin 81 MG EC tablet  Take 1 tablet by mouth daily     atorvastatin 80 MG tablet  Commonly known as: LIPITOR  Take 1 tablet by mouth nightly     cephALEXin 500 MG capsule  Commonly known as: KEFLEX  Take 1 capsule by mouth every 12 hours for 9 doses     norethindrone 5 MG tablet  Commonly known as: AYGESTIN  Take 1 tablet by mouth in the morning, at noon, and at bedtime            CONTINUE taking these medications      calcium carbonate 500 MG Tabs tablet  Commonly known as: OSCAL     ferrous sulfate 325 (65 Fe) MG tablet  Commonly known as: IRON 325  Take 1 tablet by mouth daily (with breakfast)     ondansetron 4 MG tablet  Commonly known as: Zofran  Take 1 tablet by mouth every 8 hours as needed for Nausea or Vomiting     pantoprazole 40 MG tablet  Commonly known as: Protonix  Take 1 tablet by mouth daily     promethazine 25 MG tablet  Commonly known as: PHENERGAN  Take 1 tablet by mouth every 6 hours as needed for Nausea     sucralfate 1 GM tablet  Commonly known as: Carafate  Take 1 tablet by mouth 4 times daily     therapeutic multivitamin-minerals tablet     vitamin D 1.25 MG (29944 UT) Caps capsule  Commonly known as: ERGOCALCIFEROL  Take 1 capsule by mouth once a week for 8 doses            STOP taking these medications      metoprolol succinate 100 MG extended release tablet  Commonly known as: TOPROL XL               Where to Get Your Medications        These medications were sent to [facility] [address], [address] [facility]  [address], [place] [address]      Phone: [phone]   aspirin 81 MG EC tablet  atorvastatin 80 MG tablet  cephALEXin 500 MG capsule  norethindrone 5 MG tablet       Diet: ADULT DIET; Regular diet as tolerated  Activity: As tolerated  Follow-up:  OB (Dr. Mark Esparza), [facility] (Dr. Parul De La Cruz).   Time Spent for discharge: 30 minutes    Jenifer Patel DO  Neuro Critical Care  11/3/2022, 2:43 PM

## 2022-11-03 NOTE — PROGRESS NOTES
[facility] [address]  PROGRESS NOTE    Shift date: 11/3/2022   Shift day: Thursday   Shift # 1    Room # 0126/0126-01   Name: Stanford Holloway                Samaritan: Non-Denom   Place of Sikhism: None    Referral: Routine Visit    Admit Date & Time: 10/31/2022  3:34 AM    Assessment:  Stanford Holloway is a 40 y.o. female. Upon entering the room writer observes pt sitting in chair, roses in a vase  were sitting to the side. She appeared to be hopeful and in good spirits. She spoke of her fiance and son. Intervention:   provided a supportive presence through active listening and words of affirmation.  helped patient by putting her flowers in water.  assured her of continued prayers. Outcome:  Patient expressed gratitude for visit and support. Plan:  Chaplains will remain available to offer spiritual and emotional support as needed. Electronically signed by Grover Abraham on 11/3/2022 at 9:28 AM.  [facility]  [phone]       11/03/22 3784   Encounter Summary   Service Provided For: Patient   Referral/Consult From: Zachary   Last Encounter  11/03/22   Complexity of Encounter Low   Begin Time 0921   End Time  0928   Total Time Calculated 7 min   Encounter    Type Follow up   Assessment/Intervention/Outcome   Assessment Calm; Hopeful   Intervention Active listening;Explored/Affirmed feelings, thoughts, concerns;Prayer (assurance of)/[place]   Outcome Engaged in conversation;Expressed feelings, needs, and concerns;Expressed Gratitude;Receptive

## 2022-11-03 NOTE — PROGRESS NOTES
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 Patient also had some headache developed after onset of symptoms. Symptoms resolved in 3-4 hrs. CTA head and neck showed b/l ICA intracranial severe stenosis with Randolm Ally like vasculopathy, therefore endovascular team was consulted. Patient denied any headache, weakness or numbness today. Review of Systems:  CONSTITUTIONAL:  negative for fevers, chills, fatigue and malaise    EYES:  negative for double vision, blurred vision and photophobia     HEENT:  negative for tinnitus, epistaxis and sore throat    RESPIRATORY:  negative for cough, shortness of breath, wheezing    CARDIOVASCULAR:  negative for chest pain, palpitations, syncope, edema    GASTROINTESTINAL:  negative for nausea, vomiting    GENITOURINARY:  negative for incontinence    MUSCULOSKELETAL:  negative for neck or back pain    NEUROLOGICAL:  Negative for weakness and tingling  negative for headaches and dizziness    PSYCHIATRIC:  negative for anxiety      Review of systems otherwise negative. OBJECTIVE:     Vitals:    11/03/22 1100   BP: (!) 144/70   Pulse: 79   Resp:    Temp:    SpO2: 100%        General:  Gen: normal habitus, NAD  HEENT: NCAT, mucosa moist  Cvs: RRR, S1 S2 normal  Resp: symmetric unlabored breathing  Abd: s/nd/nt  Ext: no edema  Skin: no lesions seen, warm and dry    Neuro:  Gen: awake and alert, oriented x4. Lang/speech: no aphasia or dysarthria. Follows commands.   CN: PERRL, EOMI, VFF, V1-3 intact, face symmetric, hearing intact, shoulder shrug symmetric, tongue midline  Motor: grossly 5/5 UE and LE b/l  Sense: LT intact in all 4 ext. Coord: FTN and HTS intact b/l  DTR: deferred  Gait: narrow base gait    NIH Stroke Scale:   1a  Level of consciousness: 0 - alert; keenly responsive   1b. LOC questions:  0 - answers both questions correctly   1c. LOC commands: 0 - performs both tasks correctly   2. Best Gaze: 0 - normal   3. Visual: 0 - no visual loss   4. Facial Palsy: 0 - normal symmetric movement   5a. Motor left arm: 0 - no drift, limb holds 90 (or 45) degrees for full 10 seconds   5b. Motor right arm: 0 - no drift, limb holds 90 (or 45) degrees for full 10 seconds   6a. Motor left le - no drift; leg holds 30 degree position for full 5 seconds   6b  Motor right le - no drift; leg holds 30 degree position for full 5 seconds   7. Limb Ataxia: 0 - absent   8. Sensory: 0 - normal; no sensory loss   9. Best Language:  0 - no aphasia, normal   10. Dysarthria: 0 - normal   11. Extinction and Inattention: 0 - no abnormality         Total:   0     MRS: 0      LABS:   Reviewed. Lab Results   Component Value Date    HGB 8.6 (L) 2022    WBC 11.2 2022     2022     2022    BUN 7 2022    CREATININE 0.58 2022    AST 18 2022    ALT 8 2022    MG 1.5 (L) 2022    APTT 28.2 10/30/2022    INR 1.1 10/30/2022      Lab Results   Component Value Date/Time    COVID19 Not Detected 2021 01:24 PM       RADIOLOGY:   Images were personally reviewed including:  CTA head and neck:   Severe stenosis in the distal supraclinoid segment of the right internal   carotid artery, with occlusion in the proximal M1 segment of the right MCA   and at origin of A1 segment of the right NERIEDA, with prominent collateral   vessels, 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        No acute abnormality or flow-limiting stenosis of the major arteries of the neck.     MRI brain:   No acute infarct, hemorrhage or intracranial mass effect. Sequelae of moyamoya, as detailed above. ASSESSMENT:     41 y/o F with pmh significant for obesity, Htn presented with transient episode of slurred speech, right facial numbness and right arm weakness and numbness. 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 MRI brain No strokes/hemorrhage. Patient was recommended to have DSA to evaluate for Wild Wild and assess collateral flow. Anemia: Hb was 6.5       PLAN:   -- holding off DSA until patient is stable, this can also be done outpatient    -- moyamoya is a chronic disease, patient has no stroke on MRI, there is no immediate treatment needed, stabilizing her bleeding is of utmost priority, in fact decrease hemoglobin can cause her to have strokes or increase stroke risk due to insufficient perfusion    --Anemia: blood transfusion per ICU team     --resume aspirin 81mg daily, GYN team is ok with it too    -- f/u Dr. Nadya Sotomayor in 2-3 weeks, will schedule a DSA outpatient when patient's bleeding is significantly controlled and anemia improved    Case discussed with Dr. Maribell Candelario attending.     Talya Louie MD    Stroke, [facility]  [facility]  Electronically signed 11/3/2022 at 12:34 PM

## 2022-11-03 NOTE — PLAN OF CARE
Problem: Discharge Planning  Goal: Discharge to home or other facility with appropriate resources  11/3/2022 0418 by Charli Bond RN  Outcome: Progressing       Problem: Safety - Adult  Goal: Free from fall injury  11/3/2022 0418 by Charli Bond RN  Outcome: Progressing       Problem: ABCDS Injury Assessment  Goal: Absence of physical injury  Outcome: Progressing

## 2022-11-03 NOTE — PROGRESS NOTES
Daily Progress Note  Neuro Critical Care    Patient Name: Cinthya Pringle  Patient : 1985  Room/Bed: 0126/0126-01  Code Status: Full code  Allergies: Allergies   Allergen Reactions    Vancomycin Itching     Patient felt like skin was on fire. CHIEF COMPLAINT:      R arm weakness/numbness     INTERVAL HISTORY    Initial Presentation (Admitted 10/31/22): The patient is a 40 y.o. female w/ PMHx significant for hypertension, hyperlipidemia, diabetes who presented to the ER with complaint of right-sided arm numbness as well as slurred speech and right facial droop. Patient states that she was cooking with her fiancé approximately 2 hours ago when he noticed that she was slurring her speech and had a right-sided facial droop. Patient was also complaining of numbness in her arm. At time of initial evaluation in the ER, she stated that her right-sided facial droop and slurred speech had improved she still was having consistent right arm numbness. Denied any fever or chills or blood thinner use. Does have a strong family history of acute CVA. Denies any chest pain, shortness of breath, neck stiffness, blurry vision, double vision, difficulty swallowing, bowel pain, nausea vomiting or diarrhea. CTH negative for any intracranial abnormalities. 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 Given ASA and Plavix in the ER. Transferred to [facility] neuro ICU for further monitoring and workup. At time of initial examination, patient was still asymptomatic. AAOx3, GCS 15. No complaints at this time.     Hospital Course:   10/31: Continued vaginal bleeding. OB/GYN was consulted and patient was started on Aygestin and Lupron. Patient status post 3 units PRBC. DSA 22. No further weakness or numbness. : Less vaginal bleeding, another unit pRBC. DSA canceled due to vaginal bleeding and anemia. Arterial line placed L fem. Continuing to work with OB regarding vaginal bleeding. : Hgb low again this AM. Pt with some continued improvement in vaginal bleeding. No lightheadedness. Another 1 u pRBC ordered and repeat Hgb 8.9. Lupron injection given. Last 24 hrs-   No acute events overnight. Given Lupron injection yesterday by OB. Bleeding has significantly decreased. Pain is much more controlled at this time. Urinalysis was weakly positive for UTI. Discussed with patient, denies any symptoms that this time but dose have a history of urinary tract infections and normally is asymptomatic. Will treat with 5 day course of Keflex. Remains afebrile, Tmax 98. Hemodynamically stable, HR 80s-90s. BP systolic 694M-548P. No acute respiratory concerns at this time. Na 137/K4.0  Bun 7/Cr 0.58    Plan for discharge today if ok with NEV and GYN teams.     CURRENT MEDICATIONS:  SCHEDULED MEDICATIONS:   cephALEXin  500 mg Oral 2 times per day    atorvastatin  80 mg Oral Nightly    [Held by provider] clopidogrel  75 mg Oral Daily    [Held by provider] aspirin  81 mg Oral Daily    pantoprazole  40 mg Oral QAM AC    sennosides-docusate sodium  2 tablet Oral Daily    ferrous sulfate  325 mg Oral BID WC    norethindrone  5 mg Oral TID     CONTINUOUS INFUSIONS:      PRN MEDICATIONS:   potassium chloride **OR** potassium alternative oral replacement **OR** potassium chloride, magnesium sulfate, acetaminophen, ondansetron **OR** ondansetron, polyethylene glycol, ketorolac    VITALS:  Temperature Range: Temp: 98 °F (36.7 °C) Temp  Av.4 °F (36.9 °C)  Min: 97.8 °F (36.6 °C)  Max: 98.7 °F (37.1 °C)  BP Range: Systolic (46XQF), H , Min:93 , Max:158 Diastolic (66EOS), BQP:79, Min:59, Max:90    Pulse Range: Pulse  Av.7  Min: 73  Max: 96  Respiration Range: Resp  Av  Min: 12  Max: 28  Current Pulse Ox: SpO2: 99 %  24HR Pulse Ox Range: SpO2  Av.6 %  Min: 96 %  Max: 100 %  Patient Vitals for the past 12 hrs:   BP Temp Temp src Pulse Resp SpO2   22 0700 133/76 -- -- 82 25 99 %   22 0600 (!) 103/90 98 °F (36.7 °C) Oral 73 16 97 %   22 0505 133/84 -- -- 90 24 98 %   22 0400 125/64 97.8 °F (36.6 °C) Oral 83 20 98 %   22 0300 131/68 -- -- 81 16 97 %   22 0235 111/65 98.6 °F (37 °C) Oral 79 18 97 %   22 0100 (!) 149/81 -- -- 85 -- 97 %   22 0000 (!) 153/69 98.7 °F (37.1 °C) Oral 82 24 98 %   22 2300 138/81 -- -- 82 19 98 %   22 2200 (!) 131/59 98.3 °F (36.8 °C) Oral 84 17 98 %   22 2100 (!) 156/82 -- -- 94 16 99 %   22 2055 138/64 98.4 °F (36.9 °C) Oral 88 18 97 %     Estimated body mass index is 50.73 kg/m² as calculated from the following:    Height as of this encounter: 5' 3\" (1.6 m). Weight as of this encounter: 286 lb 6 oz (129.9 kg).  []<16 Severe malnutrition  []16-16.99 Moderate malnutrition  []17-18.49 Mild malnutrition  []18.5-24.9 Normal  []25-29.9 Overweight (not obese)  []30-34.9 Obese class 1 (Low Risk)  []35-39.9 Obese class 2 (Moderate Risk)  [x]? 40 Obese class 3 (High Risk)    RECENT LABS:   Lab Results   Component Value Date    WBC 11.2 2022    HGB 8.6 (L) 2022    HCT 26.4 (L) 2022     2022    CHOL 148 10/31/2022    TRIG 66 10/31/2022    HDL 48 10/31/2022    ALT 8 2022    AST 18 2022     2022    K 4.0 2022     (H) 2022    CREATININE 0.58 2022    BUN 7 2022    CO2 18 (L) 2022    TSH 2.12 2022    INR 1.1 10/30/2022    LABA1C 5.2 10/31/2022     24 HOUR INTAKE/OUTPUT:No intake or output data in the 24 hours ending 22 0810    IMAGING:   MRI brain without contrast Final Result   No acute infarct, hemorrhage or intracranial mass effect. Sequelae of moyamoya, as detailed above. Labs and Images reviewed with:  [] Dr. Willy Berger    [x] Dr. Edie Melendez  [] Dr. Mercedez Campbell  [] There are no new interval images to review. PHYSICAL EXAM       CONSTITUTIONAL:  Well developed, well nourished, alert and oriented x 3, in no acute distress. GCS 15. Nontoxic. No dysarthria. No aphasia. HEAD:  normocephalic, atraumatic    EYES:  PERRLA, EOMI.   ENT:  moist mucous membranes   NECK:  supple, symmetric   LUNGS:  Equal air entry bilaterally   CARDIOVASCULAR:  normal s1 / s2, RRR, distal pulses intact   ABDOMEN:  Soft, no rigidity   NEUROLOGIC:  Mental Status:  A & O x3,awake             Cranial Nerves:    cranial nerves II-XII are grossly intact    Motor Exam:    Drift:  absent  Tone:  normal    Motor exam is symmetrical 5 out of 5 all extremities bilaterally    Sensory:    Touch:    Right Upper Extremity:  normal  Left Upper Extremity:  normal  Right Lower Extremity:  normal  Left Lower Extremity:  normal         DRAINS:  [x] There are no drains for Neuro Critical Care to monitor at this time. ASSESSMENT AND PLAN:       71-year-old female, history of pretension, HLD, DM admitted for valuation of possible moyamoya now with vaginal bleeding for which OB/GYN has been consulted. Patient was found to have occlusion in the proximal M1 segment of the right MCA and at origin of the A1 segment of the right NEREIDA with prominent collateral vessels. Currently treating anemia with pRBC transfusions and waiting DSA by Jesu Cool.     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 - IVF: 100ml/hr while NPO     GI/NUTRITION:  NUTRITION:  Diet Regular  - Bowel regimen: glycolax, Zofran PRN  - GI prophylaxis: cont home PPI     ID:  - Afebrile  - Leukocytosis improved  - UA positive for UTI, plan for 5 day course of keflex    HEME:  - Anemia likely 2/2 vaginal bleeding, fibroids  - OB/Gyn consulted, appreciated recommendations  - Hgb stable this AM, 8.4 from 8.9  - s/p 4 units pRBC  - Aygestin 5mg TID, Lupron per OB recs  - Venofer day 3/3 and ferrous sulfate 325mg BID     OTHER:  - PT/OT  - Code Status: Full Code     PROPHYLAXIS:  Stress ulcer: continue home PPI     DVT PROPHYLAXIS:  - SCD sleeves - Thigh High     DISPOSITION:  [] To remain ICU:   [x] OK for discharge home ICU from Neuro Critical Care standpoint pending final NEV and GYN recs    We will continue to follow along. For any changes in exam or patient status please contact Neuro Critical Care.       Jenifer Patel,   PGY 2  Neuro Critical Care  11/3/2022     8:10 AM

## 2022-11-03 NOTE — PLAN OF CARE
Problem: Discharge Planning  Goal: Discharge to home or other facility with appropriate resources  11/3/2022 1637 by Levon John RN  Outcome: Completed  11/3/2022 0418 by Drea Clemente RN  Outcome: Progressing     Problem: Safety - Adult  Goal: Free from fall injury  11/3/2022 1637 by Levon John RN  Outcome: Completed  11/3/2022 0418 by Drea Clemente RN  Outcome: Progressing     Problem: ABCDS Injury Assessment  Goal: Absence of physical injury  11/3/2022 1637 by Levon John RN  Outcome: Completed  11/3/2022 0418 by Drea Clemente RN  Outcome: Progressing

## 2022-11-04 NOTE — PROGRESS NOTES
CLINICAL PHARMACY NOTE: MEDS TO BEDS    Total # of Prescriptions Filled: 0   The following medications were delivered to the patient:      Additional Documentation:     RXS SENT TO Patricia Tabares

## 2022-11-04 NOTE — CARE COORDINATION
Discharge [facility] Case Management Department  Written by: Saleem Jang RN    Patient Name: Yong Dye  Attending Provider: No att. providers found  Admit Date: 10/31/2022  3:34 AM  MRN: [id number]  Account: [de-identified]                     : 1985  Discharge Date: 11/3/2022      Disposition: home no needs     Saleem Jang RN

## 2022-11-16 ENCOUNTER — OFFICE VISIT (OUTPATIENT)
Dept: OBGYN CLINIC | Age: 37
End: 2022-11-16
Payer: MEDICARE

## 2022-11-16 VITALS
DIASTOLIC BLOOD PRESSURE: 84 MMHG | WEIGHT: 291 LBS | HEART RATE: 73 BPM | BODY MASS INDEX: 51.56 KG/M2 | HEIGHT: 63 IN | SYSTOLIC BLOOD PRESSURE: 150 MMHG

## 2022-11-16 DIAGNOSIS — N93.9 ABNORMAL UTERINE BLEEDING (AUB): Primary | ICD-10-CM

## 2022-11-16 DIAGNOSIS — D25.1 INTRAMURAL AND SUBSEROUS LEIOMYOMA OF UTERUS: ICD-10-CM

## 2022-11-16 DIAGNOSIS — D25.2 INTRAMURAL AND SUBSEROUS LEIOMYOMA OF UTERUS: ICD-10-CM

## 2022-11-16 PROCEDURE — 1111F DSCHRG MED/CURRENT MED MERGE: CPT | Performed by: OBSTETRICS & GYNECOLOGY

## 2022-11-16 PROCEDURE — 1036F TOBACCO NON-USER: CPT | Performed by: OBSTETRICS & GYNECOLOGY

## 2022-11-16 PROCEDURE — 3074F SYST BP LT 130 MM HG: CPT | Performed by: OBSTETRICS & GYNECOLOGY

## 2022-11-16 PROCEDURE — G8484 FLU IMMUNIZE NO ADMIN: HCPCS | Performed by: OBSTETRICS & GYNECOLOGY

## 2022-11-16 PROCEDURE — G8417 CALC BMI ABV UP PARAM F/U: HCPCS | Performed by: OBSTETRICS & GYNECOLOGY

## 2022-11-16 PROCEDURE — 3078F DIAST BP <80 MM HG: CPT | Performed by: OBSTETRICS & GYNECOLOGY

## 2022-11-16 PROCEDURE — 99213 OFFICE O/P EST LOW 20 MIN: CPT | Performed by: OBSTETRICS & GYNECOLOGY

## 2022-11-16 PROCEDURE — G8427 DOCREV CUR MEDS BY ELIG CLIN: HCPCS | Performed by: OBSTETRICS & GYNECOLOGY

## 2022-11-16 ASSESSMENT — ENCOUNTER SYMPTOMS
ABDOMINAL PAIN: 0
BACK PAIN: 0
COUGH: 0
SHORTNESS OF BREATH: 0

## 2022-11-16 NOTE — PROGRESS NOTES
[address] [facility] - [address] [address]  [address]  Dept: [phone]  Dept Fax: [phone]    22    Chief Complaint   Patient presents with    Etta Jiménez 40 y.o. has a complaint of heavy periods. She had her IUD removed a few months ago for malposition and since then she has had heavy bleeding. She had a stroke on 10/31 and was admitted to the hospital and started on anticoagulation. Her bleeding worsened and she had to be transfused multiple units of PRBCs. She received a tapering dose of aygestin and an injection of lupron and her bleeding significantly slowed. Since receiving the lupron and hasn't bled since she left the hospital 2 weeks ago. She has had some hot flushes. She is wanting a hysterectomy since the IUD doesn't work well with her large fibroid uterus. Review of Systems   Constitutional:  Negative for chills and fever. HENT:  Negative for congestion. Respiratory:  Negative for cough and shortness of breath. Cardiovascular:  Negative for chest pain and palpitations. Gastrointestinal:  Negative for abdominal pain. Genitourinary:  Negative for dyspareunia, pelvic pain and vaginal discharge. Musculoskeletal:  Negative for back pain. Neurological:  Negative for dizziness and light-headedness. Psychiatric/Behavioral:  The patient is not nervous/anxious. Gynecologic History  No LMP recorded (lmp unknown). (Menstrual status: Irregular periods).   Contraception: none  Last Pap: 3/30/22  Results: ASCUS HPV neg  Last Mammogram: n/a    Obstetric History  : 1  Para: 1  AB: 0    Past Medical History:   Diagnosis Date    Class 3 severe obesity due to excess calories without serious comorbidity with body mass index (BMI) of 60.0 to 69.9 in adult [facility]) 2019    Essential hypertension 2019    managed by Dr. Kaitlin Morse     Hyperlipidemia 2016    Hyperlipidemia     Pneumonia due to COVID-19 virus 2021    SOB, fatigue, cough, diarrhea, dizziness x 1 week; no hospitalization    Prediabetes     Under care of team 2022    PCP: Lul Osman/Macho, last visit-patient goes 2022 for clearance     Past Surgical History:   Procedure Laterality Date     SECTION      INTRAUTERINE DEVICE INSERTION  2021    Lori [address] [address]81-858-28 Lot [id number] Exp 2023    SLEEVE GASTRECTOMY  2022    ROBOTIC LAPAROSCOPIC GASTRIC SLEEVE, EGD    SLEEVE GASTRECTOMY N/A 3/7/2022    XI ROBOTIC LAPAROSCOPIC GASTRIC SLEEVE, EGD performed by Edwin Arias DO at [address] N/A 2021    EGD BIOPSY performed by Edwin Arias DO at [address]   Allergen Reactions    Vancomycin Itching     Patient felt like skin was on fire.      Current Outpatient Medications   Medication Sig Dispense Refill    aspirin 81 MG EC tablet Take 1 tablet by mouth daily 30 tablet 1    atorvastatin (LIPITOR) 80 MG tablet Take 1 tablet by mouth nightly 30 tablet 1    norethindrone (AYGESTIN) 5 MG tablet Take 1 tablet by mouth in the morning, at noon, and at bedtime 90 tablet 0    ondansetron (ZOFRAN) 4 MG tablet Take 1 tablet by mouth every 8 hours as needed for Nausea or Vomiting 30 tablet 0    pantoprazole (PROTONIX) 40 MG tablet Take 1 tablet by mouth daily 30 tablet 3    sucralfate (CARAFATE) 1 GM tablet Take 1 tablet by mouth 4 times daily 120 tablet 3    ferrous sulfate (IRON 325) 325 (65 Fe) MG tablet Take 1 tablet by mouth daily (with breakfast) 90 tablet 1    vitamin D (ERGOCALCIFEROL) 1.25 MG (17831 UT) CAPS capsule Take 1 capsule by mouth once a week for 8 doses 8 capsule 0    Multiple Vitamins-Minerals (THERAPEUTIC MULTIVITAMIN-MINERALS) tablet Take 1 tablet by mouth in the morning and at bedtime MVI from [facility]      calcium carbonate (OSCAL) 500 MG TABS tablet Take 500 mg by mouth daily Bariatric fusion      promethazine (PHENERGAN) 25 MG tablet Take 1 tablet by mouth every 6 hours as needed for Nausea 30 tablet 0     No current facility-administered medications for this visit. Social History     Socioeconomic History    Marital status: Single     Spouse name: Not on file    Number of children: 1    Years of education: Not on file    Highest education level: Not on file   Occupational History    Not on file   Tobacco Use    Smoking status: Never    Smokeless tobacco: Never   Vaping Use    Vaping Use: Never used   Substance and Sexual Activity    Alcohol use: No    Drug use: No    Sexual activity: Not Currently   Other Topics Concern    Not on file   Social History Narrative    ** Merged History Encounter **          Social Determinants of Health     Financial Resource Strain: Low Risk     Difficulty of Paying Living Expenses: Not hard at all   Food Insecurity: No Food Insecurity    Worried About Running Out of Food in the Last Year: Never true    Ran Out of Food in the Last Year: Never true   Transportation Needs: Not on file   Physical Activity: Not on file   Stress: Not on file   Social Connections: Not on file   Intimate Partner Violence: Not on file   Housing Stability: Not on file     Family History   Problem Relation Age of Onset    Heart Disease Mother     Other Mother     Heart Disease Father     Other Father     Depression Mother     Diabetes Mother     High Blood Pressure Mother     Diabetes Maternal Grandmother     High Blood Pressure Maternal Grandmother     Kidney Disease Maternal Grandmother     Cancer Maternal Grandfather         prostate    Diabetes Maternal Grandfather     Heart Disease Maternal Grandfather     High Blood Pressure Maternal Grandfather     Cancer Paternal Grandmother         breast and brain       Physical exam Physical Exam  Constitutional:       Appearance: Normal appearance. She is normal weight.    HENT:      Head: Normocephalic. Eyes:      Extraocular Movements: Extraocular movements intact. Pulmonary:      Effort: Pulmonary effort is normal.   Neurological:      Mental Status: She is alert and oriented to person, place, and time. Psychiatric:         Mood and Affect: Mood normal.         Behavior: Behavior normal.         Thought Content: Thought content normal.         Judgment: Judgment normal.       TVUS 8/22/22:  FINDINGS:   Overall difficult exam due to patient body habitus           Measurements:       Uterus: 12.5 x 5.1 x 4.9 cm       Endometrial stripe: Approximately 2.5 mm       Right Ovary:3.4 x 1.8 x 2.4 cm       Left Ovary: 2.6 x 1.8 x 2.3 cm           Ultrasound Findings:       Uterus: Uterus has a somewhat heterogeneous myometrial echotexture with   redemonstration of uterine fibroids. Representative hypoechoic fundal   fibroid measures 4.2 x 3.1 x 3.4 cm. The cervix appears somewhat   bulky/bulbous and \"masslike\" in appearance measuring up to 5.8 x 5.3 x 5 cm   on transvaginal scan. The general appearance is similar to the prior   studies. Recommend correlation with physical exam/direct visualization to   rule out a cervical mass. Suggest pelvic MRI if additional imaging is   warranted clinically. Endometrial stripe: Endometrial stripe visualized is within normal limits. IUD is lower in position than typical within the bulky cervix/lower uterine   segment. Right Ovary: Structure felt to represent the right ovary is within normal   limits. Left Ovary:  Structure felt to represent the left ovary is within normal   limits. Free Fluid: No evidence of free fluid. Impression   Fibroid uterus. Bulky/bulbous appearance of the cervix. IUD is lower in position than   typical within the cervix/lower uterine segment. Correlate with physical   exam findings/direct visualization to rule out a cervical mass. The ovaries appear unremarkable.        Assessment/Plan 1. Abnormal uterine bleeding  - Pt had lupron injection 11/2 for her heavy bleeding.  - Received blood transfusions while in the hospital due to severity of her bleeding  - likely 2/2 her multiple fibroids.  - Previously failed IUD therapy as IUD migrated with her fibroids  - Pt wanting a hysterectomy. Discussed risks of robotic hysterectomy in normal detailed fashion. Discussed at length the risks and benefits of concurrent bilateral salpingectomy with patient's upcoming scheduled abdominal or pelvic surgery per recommendation of the Society of Gynecologic Oncology, American College of Obstetricians and Gynecologists as this patient is at above average risk for development of ovarian cancer. Advised patient that the primary benefit of such surgery is a 65% reduction in future risk of ovarian cancer. Patient advised that large scale studies have not demonstrated an increase in estimated blood loss, complications, or operating time but that bleeding, infection, and injury to nearby organs are potential complications with this additional surgery. Finally, patient has been thoroughly counseled regarding the consequence of loss of fertility following this procedure. Patient understands that this loss of fertility can not be reversed and has expressed via verbal and written consent that her wishes are to proceed with this surgery for the purposes of ovarian cancer reduction. All questions answered. Consent signed for robotic hysterectomy with bilateral salpingectomy and cysto. 2. Intramural and subserous leiomyoma of uterus  - Multiple fibroid uterus causing significant bleeding with menses     Pt to have medical clearance from neurology for surgery with her h/o recent stroke and diagnosis of wild [place].    Pt scheduled for EMB prior to surgery    Stephanie Hendricks MD  [address]

## 2022-11-25 ENCOUNTER — OFFICE VISIT (OUTPATIENT)
Dept: NEUROLOGY | Age: 37
End: 2022-11-25
Payer: MEDICARE

## 2022-11-25 VITALS
BODY MASS INDEX: 51.56 KG/M2 | WEIGHT: 291 LBS | HEART RATE: 98 BPM | DIASTOLIC BLOOD PRESSURE: 84 MMHG | HEIGHT: 63 IN | SYSTOLIC BLOOD PRESSURE: 132 MMHG

## 2022-11-25 DIAGNOSIS — I67.5 MOYA MOYA DISEASE: Primary | ICD-10-CM

## 2022-11-25 PROCEDURE — 3078F DIAST BP <80 MM HG: CPT | Performed by: STUDENT IN AN ORGANIZED HEALTH CARE EDUCATION/TRAINING PROGRAM

## 2022-11-25 PROCEDURE — 1111F DSCHRG MED/CURRENT MED MERGE: CPT | Performed by: STUDENT IN AN ORGANIZED HEALTH CARE EDUCATION/TRAINING PROGRAM

## 2022-11-25 PROCEDURE — G8427 DOCREV CUR MEDS BY ELIG CLIN: HCPCS | Performed by: STUDENT IN AN ORGANIZED HEALTH CARE EDUCATION/TRAINING PROGRAM

## 2022-11-25 PROCEDURE — 1036F TOBACCO NON-USER: CPT | Performed by: STUDENT IN AN ORGANIZED HEALTH CARE EDUCATION/TRAINING PROGRAM

## 2022-11-25 PROCEDURE — G8417 CALC BMI ABV UP PARAM F/U: HCPCS | Performed by: STUDENT IN AN ORGANIZED HEALTH CARE EDUCATION/TRAINING PROGRAM

## 2022-11-25 PROCEDURE — 99215 OFFICE O/P EST HI 40 MIN: CPT | Performed by: STUDENT IN AN ORGANIZED HEALTH CARE EDUCATION/TRAINING PROGRAM

## 2022-11-25 PROCEDURE — G8484 FLU IMMUNIZE NO ADMIN: HCPCS | Performed by: STUDENT IN AN ORGANIZED HEALTH CARE EDUCATION/TRAINING PROGRAM

## 2022-11-25 PROCEDURE — 3074F SYST BP LT 130 MM HG: CPT | Performed by: STUDENT IN AN ORGANIZED HEALTH CARE EDUCATION/TRAINING PROGRAM

## 2022-11-30 DIAGNOSIS — I10 ESSENTIAL HYPERTENSION: ICD-10-CM

## 2022-11-30 DIAGNOSIS — E66.01 MORBID OBESITY WITH BMI OF 50.0-59.9, ADULT (HCC): ICD-10-CM

## 2022-11-30 DIAGNOSIS — Z98.84 S/P LAPAROSCOPIC SLEEVE GASTRECTOMY: ICD-10-CM

## 2022-11-30 DIAGNOSIS — E78.5 HYPERLIPIDEMIA, UNSPECIFIED HYPERLIPIDEMIA TYPE: ICD-10-CM

## 2022-11-30 DIAGNOSIS — R73.03 PRE-DIABETES: ICD-10-CM

## 2022-11-30 DIAGNOSIS — R13.19 ESOPHAGEAL DYSPHAGIA: ICD-10-CM

## 2022-12-01 RX ORDER — ONDANSETRON 4 MG/1
4 TABLET, FILM COATED ORAL EVERY 8 HOURS PRN
Qty: 30 TABLET | Refills: 0 | Status: SHIPPED | OUTPATIENT
Start: 2022-12-01

## 2022-12-02 NOTE — PROGRESS NOTES
Endovascular Neurosurgery Clinic Note      Reason for evaluation: [facility] Fire disease     SUBJECTIVE:   History of Chief Complaint:    Ms. Radha Troncoso is a 39 y/o f with past medical history significant for obesity, hypertension, moyamoya syndrome presented for first hospital follow-up. Briefly, patient presented to [facility] with acute onset transient slurred speech, right facial numbness and right arm weakness and numbness. Patient's symptoms resolved and she came back to the baseline. MRI brain was negative for any stroke. CTA head and neck showed left ICA occlusion, moyamoya vessels at left MCA M1 segment, right ICA terminus severe stenosis. During the hospital stay patient had  bleeding with Low Hb therefore DSA could not be scheduled. Patient continues to have on and off heavy uterine bleeding and was recommended to have hysterectomy. Patient is going to schedule the surgery in Jan 2023. Since discharge patient has been taking ASA 81 and did not have any strokelike symptoms. Allergies  is allergic to vancomycin. Medications  Prior to Admission medications    Medication Sig Start Date End Date Taking?  Authorizing Provider   tranexamic acid (LYSTEDA) 650 MG TABS tablet Take 2 tablets by mouth 3 times daily for 5 days 11/22/22 11/27/22 Yes Ivy Fuentes MD   aspirin 81 MG EC tablet Take 1 tablet by mouth daily 11/3/22  Yes Audrey Irving DO   atorvastatin (LIPITOR) 80 MG tablet Take 1 tablet by mouth nightly 11/3/22  Yes Audrey Irving DO   norethindrone (AYGESTIN) 5 MG tablet Take 1 tablet by mouth in the morning, at noon, and at bedtime 11/3/22  Yes Audrey Irving DO   pantoprazole (PROTONIX) 40 MG tablet Take 1 tablet by mouth daily 9/22/22  Yes JULIETA Mustafa CNP   sucralfate (CARAFATE) 1 GM tablet Take 1 tablet by mouth 4 times daily 9/22/22  Yes JULIETA Mustafa CNP   ferrous sulfate (IRON 325) 325 (65 Fe) MG tablet Take 1 tablet by mouth daily (with breakfast) 22  Yes JULIETA Moss CNP   Multiple Vitamins-Minerals (THERAPEUTIC MULTIVITAMIN-MINERALS) tablet Take 1 tablet by mouth in the morning and at bedtime MVI from [facility]   Yes Historical Provider, MD   calcium carbonate (OSCAL) 500 MG TABS tablet Take 500 mg by mouth daily Bariatric fusion   Yes Historical Provider, MD   promethazine (PHENERGAN) 25 MG tablet Take 1 tablet by mouth every 6 hours as needed for Nausea 3/8/22  Yes Caitlyn Diana MD   ondansetron [facility]) 4 MG tablet Take 1 tablet by mouth every 8 hours as needed for Nausea or Vomiting 22   JULIETA Moss CNP   vitamin D (ERGOCALCIFEROL) 1.25 MG (08544 UT) CAPS capsule Take 1 capsule by mouth once a week for 8 doses 22  JULIETA Moss CNP    Scheduled Meds:  Continuous Infusions:  PRN Meds:.  Past Medical History   has a past medical history of Class 3 severe obesity due to excess calories without serious comorbidity with body mass index (BMI) of 60.0 to 69.9 in adult [facility]), Essential hypertension, Hyperlipidemia, Hyperlipidemia, Pneumonia due to COVID-19 virus, Prediabetes, and Under care of team.  Past Surgical History   has a past surgical history that includes  section; intrauterine device insertion (2021); Upper gastrointestinal endoscopy (N/A, 2021); [place] tooth extraction; Sleeve Gastrectomy (2022); and Sleeve Gastrectomy (N/A, 3/7/2022). Social History   reports that she has never smoked. She has never used smokeless tobacco.   reports no history of alcohol use. reports no history of drug use.   Family History  family history includes Cancer in her maternal grandfather and paternal grandmother; Depression in her mother; Diabetes in her maternal grandfather, maternal grandmother, and mother; Heart Disease in her father, maternal grandfather, and mother; High Blood Pressure in her maternal grandfather, maternal grandmother, and mother; Kidney Disease in her maternal grandmother; Other in her father and mother. Review of Systems:  CONSTITUTIONAL:  negative for fevers, chills, fatigue and malaise    EYES:  negative for double vision, blurred vision and photophobia     HEENT:  negative for tinnitus, epistaxis and sore throat    RESPIRATORY:  negative for cough, shortness of breath, wheezing    CARDIOVASCULAR:  negative for chest pain, palpitations, syncope, edema    GASTROINTESTINAL:  negative for nausea, vomiting    GENITOURINARY:  negative for incontinence    MUSCULOSKELETAL:  negative for neck or back pain    NEUROLOGICAL:  Negative for weakness and tingling  negative for headaches and dizziness    PSYCHIATRIC:  negative for anxiety      Review of systems otherwise negative. OBJECTIVE:     Vitals:    22 0923   BP: 132/84   Pulse: 98        General:  Gen: normal habitus, NAD  HEENT: NCAT, mucosa moist  Cvs: RRR, S1 S2 normal  Resp: symmetric unlabored breathing  Abd: s/nd/nt  Ext: no edema  Skin: no lesions seen, warm and dry    Neuro:  Gen: awake and alert, oriented x3. Lang/speech: no aphasia or dysarthria. Follows commands. CN: PERRL, EOMI, VFF, V1-3 intact, face symmetric, hearing intact, shoulder shrug symmetric, tongue midline  Motor: grossly 5/5 UE and LE b/l  Sense: LT intact in all 4 ext. Coord: FTN and HTS intact b/l  DTR: deferred  Gait: narrow base gait    NIH Stroke Scale:   1a  Level of consciousness: 0 - alert; keenly responsive   1b. LOC questions:  0 - answers both questions correctly   1c. LOC commands: 0 - performs both tasks correctly   2. Best Gaze: 0 - normal   3. Visual: 0 - no visual loss   4. Facial Palsy: 0 - normal symmetric movement   5a. Motor left arm: 0 - no drift, limb holds 90 (or 45) degrees for full 10 seconds   5b. Motor right arm: 0 - no drift, limb holds 90 (or 45) degrees for full 10 seconds   6a.  Motor left le - no drift; leg holds 30 degree position for full 5 seconds   6b  Motor right leg: 0 - no drift; leg holds 30 degree position for full 5 seconds   7. Limb Ataxia: 0 - absent   8. Sensory: 0 - normal; no sensory loss   9. Best Language:  0 - no aphasia, normal   10. Dysarthria: 0 - normal   11. Extinction and Inattention: 0 - no abnormality         Total:   0     MRS: 0      LABS:   Reviewed. Lab Results   Component Value Date    HGB 8.6 (L) 11/03/2022    WBC 11.2 11/03/2022     11/03/2022     11/03/2022    BUN 7 11/03/2022    CREATININE 0.58 11/03/2022    AST 18 11/01/2022    ALT 8 11/01/2022    MG 1.5 (L) 11/02/2022    APTT 28.2 10/30/2022    INR 1.1 10/30/2022      Lab Results   Component Value Date/Time    COVID19 Not Detected 08/16/2021 01:24 PM       RADIOLOGY:   Images were personally reviewed including:  CTA head and neck:   Severe stenosis in the distal supraclinoid segment of the right internal   carotid artery, with occlusion in the proximal M1 segment of the right MCA   and at origin of A1 segment of the right NEREIDA, with prominent collateral   vessels, 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 No acute abnormality or flow-limiting stenosis of the major arteries of the   neck. MRI brain:   No acute infarct, hemorrhage or intracranial mass effect    ASSESSMENT:   41 y/o f with past medical history significant for obesity, hypertension, moyamoya syndrome presented for first hospital follow-up. DSA could not be completed during the hospital stay due to anemia and  bleeding. Neuro exam was non focal. NIHSS was 0  PLAN:   --c/w ASA 81 mg daily   --will schedule outpatient DSA once patient is done with hysterectomy and  source of bleeding is taken care of.   ---140 mm Hg. --Avoid hypotension in per-procedural period   --F/up with Dr. Tamika Nazario on 03/27/23     Case discussed with Dr. Tamika Nazario attending.     May Hernandez MD    Stroke, Neurocritical Care & [address] [facility]  [id number] [address]  Electronically signed 12/1/2022 at 7:23 PM    Stroke/Neurointerventional Attending Note Resident Attestation:  DOS: 11/25/2022  I obtained brief history, examined the patient,reviewed the residents note and agree with the documented findings and plan of care. Any areas of disagreement are noted on the chart. I agree with the chief complaint, past medical history, past surgical history, allergies, medications, social and family history as documented unless otherwise noted above. Total visit time for evaluation including obtaining history, chart review, physical examination, management, evaluation, imaging reviews and discussion with the clinical team and family: 36 minutes    Diane Cunha MD, [facility]  Office [phone].  Cell [phone]  Stroke, [facility]  [facility]

## 2022-12-15 ENCOUNTER — OFFICE VISIT (OUTPATIENT)
Dept: BARIATRICS/WEIGHT MGMT | Age: 37
End: 2022-12-15
Payer: MEDICARE

## 2022-12-15 VITALS
HEART RATE: 50 BPM | WEIGHT: 275 LBS | SYSTOLIC BLOOD PRESSURE: 138 MMHG | BODY MASS INDEX: 48.73 KG/M2 | DIASTOLIC BLOOD PRESSURE: 88 MMHG | HEIGHT: 63 IN

## 2022-12-15 DIAGNOSIS — Z98.84 S/P LAPAROSCOPIC SLEEVE GASTRECTOMY: ICD-10-CM

## 2022-12-15 DIAGNOSIS — I10 ESSENTIAL HYPERTENSION: Primary | ICD-10-CM

## 2022-12-15 DIAGNOSIS — R73.03 PRE-DIABETES: ICD-10-CM

## 2022-12-15 DIAGNOSIS — E66.01 MORBID OBESITY WITH BMI OF 50.0-59.9, ADULT (HCC): ICD-10-CM

## 2022-12-15 DIAGNOSIS — N93.9 ABNORMAL UTERINE BLEEDING (AUB): ICD-10-CM

## 2022-12-15 DIAGNOSIS — R13.19 ESOPHAGEAL DYSPHAGIA: ICD-10-CM

## 2022-12-15 DIAGNOSIS — I67.5 MOYA MOYA DISEASE: ICD-10-CM

## 2022-12-15 DIAGNOSIS — K59.00 CONSTIPATION, UNSPECIFIED CONSTIPATION TYPE: ICD-10-CM

## 2022-12-15 DIAGNOSIS — D50.9 IRON DEFICIENCY ANEMIA, UNSPECIFIED IRON DEFICIENCY ANEMIA TYPE: ICD-10-CM

## 2022-12-15 DIAGNOSIS — E78.5 HYPERLIPIDEMIA, UNSPECIFIED HYPERLIPIDEMIA TYPE: ICD-10-CM

## 2022-12-15 DIAGNOSIS — D25.9 UTERINE LEIOMYOMA, UNSPECIFIED LOCATION: ICD-10-CM

## 2022-12-15 DIAGNOSIS — E66.01 OBESITY, CLASS III, BMI 40-49.9 (MORBID OBESITY) (HCC): ICD-10-CM

## 2022-12-15 PROCEDURE — G8484 FLU IMMUNIZE NO ADMIN: HCPCS | Performed by: NURSE PRACTITIONER

## 2022-12-15 PROCEDURE — G8417 CALC BMI ABV UP PARAM F/U: HCPCS | Performed by: NURSE PRACTITIONER

## 2022-12-15 PROCEDURE — 3078F DIAST BP <80 MM HG: CPT | Performed by: NURSE PRACTITIONER

## 2022-12-15 PROCEDURE — 99213 OFFICE O/P EST LOW 20 MIN: CPT | Performed by: NURSE PRACTITIONER

## 2022-12-15 PROCEDURE — 3074F SYST BP LT 130 MM HG: CPT | Performed by: NURSE PRACTITIONER

## 2022-12-15 PROCEDURE — 1036F TOBACCO NON-USER: CPT | Performed by: NURSE PRACTITIONER

## 2022-12-15 PROCEDURE — G8427 DOCREV CUR MEDS BY ELIG CLIN: HCPCS | Performed by: NURSE PRACTITIONER

## 2022-12-15 RX ORDER — LUBIPROSTONE 8 UG/1
8 CAPSULE ORAL DAILY
Qty: 30 CAPSULE | Refills: 3 | Status: SHIPPED | OUTPATIENT
Start: 2022-12-15

## 2022-12-15 NOTE — PROGRESS NOTES
Post-op Bariatric Surgery Note    Subjective     Patient is 9 months s/p laparoscopic sleeve gastrectomy, down 93 lbs. Overall, doing well. Incisions well healed. Consistent use of MVI and calcium. Physical activity includes walking. Following with OB/GYN for heavy vaginal bleeding from fibroids. Planning hysterectomy. Iron has been low and unresponsive to oral iron. Recently admitted for stroke symptoms, dx Jaswant. Was found to have low Hgb from uterine bleeding and was given blood transfusions. Following with Neurology. Having severe constipation. Using senna, fiber, miralax, colace, ducolax, and MOM without relief. Allergies: Allergies   Allergen Reactions    Vancomycin Itching     Patient felt like skin was on fire. Past Medical History:     Past Medical History:   Diagnosis Date    Class 3 severe obesity due to excess calories without serious comorbidity with body mass index (BMI) of 60.0 to 69.9 in adult [facility]) 2019    Essential hypertension 2019    managed by Dr. Jewell Hilton     Hyperlipidemia 2016    Hyperlipidemia     Pneumonia due to COVID-19 virus 2021    SOB, fatigue, cough, diarrhea, dizziness x 1 week; no hospitalization    Prediabetes     Under care of team 2022    PCP: Lul Vasquez/Macho, last visit-patient goes 2022 for clearance   .     Past Surgical History:  Past Surgical History:   Procedure Laterality Date     SECTION      INTRAUTERINE DEVICE INSERTION  2021    Mirena NDC [id number] Lot [id number] Exp 2023    SLEEVE GASTRECTOMY  2022    ROBOTIC LAPAROSCOPIC GASTRIC SLEEVE, EGD    SLEEVE GASTRECTOMY N/A 3/7/2022    XI ROBOTIC LAPAROSCOPIC GASTRIC SLEEVE, EGD performed by Alvarez Wynn DO at [facility] N/A 2021    EGD BIOPSY performed by Alvarez Wynn DO at  [facility] EXTRACTION         Family History:  Family History   Problem Relation Age of Onset    Heart Disease Mother     Other Mother     Heart Disease Father     Other Father     Depression Mother     Diabetes Mother     High Blood Pressure Mother     Diabetes Maternal Grandmother     High Blood Pressure Maternal Grandmother     Kidney Disease Maternal Grandmother     Cancer Maternal Grandfather         prostate    Diabetes Maternal Grandfather     Heart Disease Maternal Grandfather     High Blood Pressure Maternal Grandfather     Cancer Paternal Grandmother         breast and brain       Social History:  Social History     Socioeconomic History    Marital status: Single     Spouse name: Not on file    Number of children: 1    Years of education: Not on file    Highest education level: Not on file   Occupational History    Not on file   Tobacco Use    Smoking status: Never    Smokeless tobacco: Never   Vaping Use    Vaping Use: Never used   Substance and Sexual Activity    Alcohol use: No    Drug use: No    Sexual activity: Not Currently   Other Topics Concern    Not on file   Social History Narrative    ** Merged History Encounter **          Social Determinants of Health     Financial Resource Strain: Low Risk     Difficulty of Paying Living Expenses: Not hard at all   Food Insecurity: No Food Insecurity    Worried About Running Out of Food in the Last Year: Never true    Ran Out of Food in the Last Year: Never true   Transportation Needs: Not on file   Physical Activity: Not on file   Stress: Not on file   Social Connections: Not on file   Intimate Partner Violence: Not on file   Housing Stability: Not on file       Current Medications:  Current Outpatient Medications   Medication Sig Dispense Refill    lubiprostone (AMITIZA) 8 MCG CAPS capsule Take 1 capsule by mouth daily 30 capsule 3    ondansetron (ZOFRAN) 4 MG tablet Take 1 tablet by mouth every 8 hours as needed for Nausea or Vomiting 30 tablet 0    aspirin 81 MG EC tablet Take 1 tablet by mouth daily 30 tablet 1 atorvastatin (LIPITOR) 80 MG tablet Take 1 tablet by mouth nightly 30 tablet 1    norethindrone (AYGESTIN) 5 MG tablet Take 1 tablet by mouth in the morning, at noon, and at bedtime 90 tablet 0    pantoprazole (PROTONIX) 40 MG tablet Take 1 tablet by mouth daily 30 tablet 3    sucralfate (CARAFATE) 1 GM tablet Take 1 tablet by mouth 4 times daily 120 tablet 3    ferrous sulfate (IRON 325) 325 (65 Fe) MG tablet Take 1 tablet by mouth daily (with breakfast) 90 tablet 1    Multiple Vitamins-Minerals (THERAPEUTIC MULTIVITAMIN-MINERALS) tablet Take 1 tablet by mouth in the morning and at bedtime [facility]      calcium carbonate (OSCAL) 500 MG TABS tablet Take 500 mg by mouth daily Bariatric fusion      promethazine (PHENERGAN) 25 MG tablet Take 1 tablet by mouth every 6 hours as needed for Nausea 30 tablet 0    tranexamic acid (LYSTEDA) 650 MG TABS tablet Take 2 tablets by mouth 3 times daily for 5 days 30 tablet 0    vitamin D (ERGOCALCIFEROL) 1.25 MG (97434 UT) CAPS capsule Take 1 capsule by mouth once a week for 8 doses 8 capsule 0     No current facility-administered medications for this visit. Vital Signs:  /88 (Site: Right Upper Arm, Position: Sitting, Cuff Size: Large Adult)   Pulse 50   Ht 5' 3\" (1.6 m)   Wt 275 lb (124.7 kg)   LMP  (LMP Unknown)   BMI 48.71 kg/m²     BMI/Height/Weight:  Body mass index is 48.71 kg/m². Review of Systems - A review of systems was performed. All was negative unless otherwise documented in HPI. Constitutional: Negative for fever, chills and diaphoresis. HENT: Negative for hearing loss and trouble swallowing. Eyes: Negative for photophobia and visual disturbance. Respiratory: Negative for cough, shortness of breath and wheezing. Cardiovascular: Negative for chest pain and palpitations. Gastrointestinal: Negative for nausea, vomiting, abdominal pain, diarrhea, blood in stool and abdominal distention.   Positive for constipation. Endocrine: Negative for polydipsia, polyphagia and polyuria. Genitourinary: Negative for dysuria, frequency, hematuria and difficulty urinating. Musculoskeletal: Negative for myalgias, joint swelling. Skin: Negative for pallor and rash. Neurological: Negative for dizziness, tremors, light-headedness and headaches. Psychiatric/Behavioral: Negative for sleep disturbance and dysphoric mood. Objective:      Physical Exam   Vital signs reviewed. General: Well-developed and well-nourished. No acute distress. Skin: Warm, dry and intact. HEENT: Normocephalic. EOMs intact. Conjunctivae normal. Neck supple. Cardiovascular: Normal rate, regular rhythm. Pulmonary/Chest: Normal effort. Lungs clear to auscultation. No rales, rhonchi or wheezing. Abdominal: Positive bowel sounds. Soft, nontender. Nondistended. No rigidity, rebound, or guarding. Incisions well healed. Musculoskeletal: Movement x4. No edema. Neurological: Gait normal. Alert and oriented to person, place, and time. Psychiatric: Normal mood and affect. Speech and behavior normal. Judgment and thought content normal. Cognition and memory intact. Assessment:       Diagnosis Orders   1. Essential hypertension  Ambulatory referral to Hematology Oncology    Comprehensive Metabolic Panel    TSH    T4, Free    Hemoglobin A1C    Vitamin B12 & Folate    Vitamin B1    Vitamin D 25 Hydroxy    Magnesium    Iron and TIBC    Vitamin A    Lipid Panel    PTH, Intact    CBC with Auto Differential    Zinc    Ferritin    lubiprostone (AMITIZA) 8 MCG CAPS capsule      2.  Hyperlipidemia, unspecified hyperlipidemia type  Ambulatory referral to Hematology Oncology    Comprehensive Metabolic Panel    TSH    T4, Free    Hemoglobin A1C    Vitamin B12 & Folate    Vitamin B1    Vitamin D 25 Hydroxy    Magnesium    Iron and TIBC    Vitamin A    Lipid Panel    PTH, Intact    CBC with Auto Differential    Zinc    Ferritin    lubiprostone (AMITIZA) 8 MCG CAPS capsule      3. Iron deficiency anemia, unspecified iron deficiency anemia type  Ambulatory referral to Hematology Oncology    Comprehensive Metabolic Panel    TSH    T4, Free    Hemoglobin A1C    Vitamin B12 & Folate    Vitamin B1    Vitamin D 25 Hydroxy    Magnesium    Iron and TIBC    Vitamin A    Lipid Panel    PTH, Intact    CBC with Auto Differential    Zinc    Ferritin    lubiprostone (AMITIZA) 8 MCG CAPS capsule      4. Pre-diabetes  Ambulatory referral to Hematology Oncology    Comprehensive Metabolic Panel    TSH    T4, Free    Hemoglobin A1C    Vitamin B12 & Folate    Vitamin B1    Vitamin D 25 Hydroxy    Magnesium    Iron and TIBC    Vitamin A    Lipid Panel    PTH, Intact    CBC with Auto Differential    Zinc    Ferritin    lubiprostone (AMITIZA) 8 MCG CAPS capsule      5. S/P laparoscopic sleeve gastrectomy  Ambulatory referral to Hematology Oncology    Comprehensive Metabolic Panel    TSH    T4, Free    Hemoglobin A1C    Vitamin B12 & Folate    Vitamin B1    Vitamin D 25 Hydroxy    Magnesium    Iron and TIBC    Vitamin A    Lipid Panel    PTH, Intact    CBC with Auto Differential    Zinc    Ferritin    lubiprostone (AMITIZA) 8 MCG CAPS capsule      6. Obesity, Class III, BMI 40-49.9 (morbid obesity) ([facility] [address]  Ambulatory referral to Hematology Oncology    Comprehensive Metabolic Panel    TSH    T4, Free    Hemoglobin A1C    Vitamin B12 & Folate    Vitamin B1    Vitamin D 25 Hydroxy    Magnesium    Iron and TIBC    Vitamin A    Lipid Panel    PTH, Intact    CBC with Auto Differential    Zinc    Ferritin    lubiprostone (AMITIZA) 8 MCG CAPS capsule      7.  Uterine leiomyoma, unspecified location  Ambulatory referral to Hematology Oncology    Comprehensive Metabolic Panel    TSH    T4, Free    Hemoglobin A1C    Vitamin B12 & Folate    Vitamin B1    Vitamin D 25 Hydroxy    Magnesium    Iron and TIBC    Vitamin A    Lipid Panel    PTH, Intact    CBC with Auto Differential    Zinc    Ferritin lubiprostone (AMITIZA) 8 MCG CAPS capsule      8. Wild wild disease  Ambulatory referral to Hematology Oncology    Comprehensive Metabolic Panel    TSH    T4, Free    Hemoglobin A1C    Vitamin B12 & Folate    Vitamin B1    Vitamin D 25 Hydroxy    Magnesium    Iron and TIBC    Vitamin A    Lipid Panel    PTH, Intact    CBC with Auto Differential    Zinc    Ferritin    lubiprostone (AMITIZA) 8 MCG CAPS capsule      9. Abnormal uterine bleeding  Ambulatory referral to Hematology Oncology    Comprehensive Metabolic Panel    TSH    T4, Free    Hemoglobin A1C    Vitamin B12 & Folate    Vitamin B1    Vitamin D 25 Hydroxy    Magnesium    Iron and TIBC    Vitamin A    Lipid Panel    PTH, Intact    CBC with Auto Differential    Zinc    Ferritin    lubiprostone (AMITIZA) 8 MCG CAPS capsule      10. Morbid obesity with BMI of 50.0-59.9, adult (HCC)  lubiprostone (AMITIZA) 8 MCG CAPS capsule      11. Esophageal dysphagia  lubiprostone (AMITIZA) 8 MCG CAPS capsule      12. Constipation, unspecified constipation type  lubiprostone (AMITIZA) 8 MCG CAPS capsule          Plan:    Dietitian visit today. Patient to continue to increase protein to obtain 60-80g/day, at least 48-64oz of fluid daily, and gradually increase exercise regimen. Bariatric post-op labs ordered. Amitiza prescribed for intractable constipation. Could also try Fleet's enema. Referral given to Hematology for iron infusions. Follow-up  Return in about 3 months (around 3/15/2023).     Orders this encounter:  Orders Placed This Encounter   Procedures    Comprehensive Metabolic Panel     Standing Status:   Future     Standing Expiration Date:   12/15/2023    TSH     Standing Status:   Future     Standing Expiration Date:   12/15/2023    T4, Free     Standing Status:   Future     Standing Expiration Date:   12/15/2023    Hemoglobin A1C     Standing Status:   Future     Standing Expiration Date:   12/15/2023    Vitamin B12 & Folate     Standing Status: Future     Standing Expiration Date:   12/15/2023    Vitamin B1     Standing Status:   Future     Standing Expiration Date:   12/15/2023    Vitamin D 25 Hydroxy     Standing Status:   Future     Standing Expiration Date:   12/15/2023    Magnesium     Standing Status:   Future     Standing Expiration Date:   12/15/2023    Iron and TIBC     Standing Status:   Future     Standing Expiration Date:   12/15/2023     Order Specific Question:   Is Patient Fasting? Answer:   Yes     Order Specific Question:   No of Hours?      Answer:   12 hours    Vitamin A     Standing Status:   Future     Standing Expiration Date:   12/15/2023    Lipid Panel     Standing Status:   Future     Standing Expiration Date:   12/15/2023     Order Specific Question:   Is Patient Fasting?/# of Hours     Answer:   12 hrs    PTH, Intact     Standing Status:   Future     Standing Expiration Date:   12/15/2023    CBC with Auto Differential     Standing Status:   Future     Standing Expiration Date:   12/15/2023    Zinc     Standing Status:   Future     Standing Expiration Date:   12/16/2023    Ferritin     Standing Status:   Future     Standing Expiration Date:   12/16/2023    Ambulatory referral to Hematology Oncology     Referral Priority:   Routine     Referral Type:   Consult for Advice and Opinion     Referral Reason:   Specialty Services Required     Referred to Provider:   Chandrakant Wick MD     Number of Visits Requested:   1         Prescriptions this encounter:  Orders Placed This Encounter   Medications    lubiprostone (AMITIZA) 8 MCG CAPS capsule     Sig: Take 1 capsule by mouth daily     Dispense:  30 capsule     Refill:  3       Electronically signed by:  Scarlet Dandy, CNP

## 2023-01-02 ENCOUNTER — HOSPITAL ENCOUNTER (OUTPATIENT)
Age: 38
Discharge: HOME | End: 2023-01-02
Payer: MEDICARE

## 2023-01-02 DIAGNOSIS — E78.5 HYPERLIPIDEMIA, UNSPECIFIED HYPERLIPIDEMIA TYPE: ICD-10-CM

## 2023-01-02 DIAGNOSIS — I67.5 MOYA MOYA DISEASE: ICD-10-CM

## 2023-01-02 DIAGNOSIS — N93.9 ABNORMAL UTERINE BLEEDING (AUB): ICD-10-CM

## 2023-01-02 DIAGNOSIS — R73.03 PRE-DIABETES: ICD-10-CM

## 2023-01-02 DIAGNOSIS — Z98.84 S/P LAPAROSCOPIC SLEEVE GASTRECTOMY: ICD-10-CM

## 2023-01-02 DIAGNOSIS — E66.01 OBESITY, CLASS III, BMI 40-49.9 (MORBID OBESITY) (HCC): ICD-10-CM

## 2023-01-02 DIAGNOSIS — D50.9 IRON DEFICIENCY ANEMIA, UNSPECIFIED IRON DEFICIENCY ANEMIA TYPE: ICD-10-CM

## 2023-01-02 DIAGNOSIS — D25.9 UTERINE LEIOMYOMA, UNSPECIFIED LOCATION: ICD-10-CM

## 2023-01-02 DIAGNOSIS — I10 ESSENTIAL HYPERTENSION: ICD-10-CM

## 2023-01-02 LAB
ABSOLUTE EOS #: 0.07 K/UL (ref 0–0.4)
ABSOLUTE LYMPH #: 2.24 K/UL (ref 1–4.8)
ABSOLUTE MONO #: 0.59 K/UL (ref 0.1–1.3)
ALBUMIN SERPL-MCNC: 3.8 G/DL (ref 3.5–5.2)
ALP BLD-CCNC: 80 U/L (ref 35–104)
ALT SERPL-CCNC: 11 U/L (ref 5–33)
ANION GAP SERPL CALCULATED.3IONS-SCNC: 8 MMOL/L (ref 9–17)
AST SERPL-CCNC: 12 U/L
BASOPHILS # BLD: 1 % (ref 0–2)
BASOPHILS ABSOLUTE: 0.07 K/UL (ref 0–0.2)
BILIRUB SERPL-MCNC: 0.2 MG/DL (ref 0.3–1.2)
BUN BLDV-MCNC: 16 MG/DL (ref 6–20)
CALCIUM SERPL-MCNC: 9.1 MG/DL (ref 8.6–10.4)
CHLORIDE BLD-SCNC: 104 MMOL/L (ref 98–107)
CHOLESTEROL/HDL RATIO: 2.5
CHOLESTEROL: 150 MG/DL
CO2: 28 MMOL/L (ref 20–31)
CREAT SERPL-MCNC: 0.7 MG/DL (ref 0.5–0.9)
EOSINOPHILS RELATIVE PERCENT: 1 % (ref 0–4)
ESTIMATED AVERAGE GLUCOSE: 97 MG/DL
FERRITIN: 13 NG/ML (ref 13–150)
FOLATE: 8.5 NG/ML
GFR SERPL CREATININE-BSD FRML MDRD: >60 ML/MIN/1.73M2
GLUCOSE BLD-MCNC: 100 MG/DL (ref 70–99)
HBA1C MFR BLD: 5 % (ref 4–6)
HCT VFR BLD CALC: 29.4 % (ref 36–46)
HDLC SERPL-MCNC: 59 MG/DL
HEMOGLOBIN: 9.3 G/DL (ref 12–16)
IRON SATURATION: 6 % (ref 20–55)
IRON: 25 UG/DL (ref 37–145)
LDL CHOLESTEROL: 82 MG/DL (ref 0–130)
LYMPHOCYTES # BLD: 34 % (ref 24–44)
MAGNESIUM: 1.8 MG/DL (ref 1.6–2.6)
MCH RBC QN AUTO: 23.9 PG (ref 26–34)
MCHC RBC AUTO-ENTMCNC: 31.7 G/DL (ref 31–37)
MCV RBC AUTO: 75.5 FL (ref 80–100)
MONOCYTES # BLD: 9 % (ref 1–7)
MORPHOLOGY: ABNORMAL
PDW BLD-RTO: 18 % (ref 11.5–14.9)
PLATELET # BLD: 389 K/UL (ref 150–450)
PMV BLD AUTO: 8.3 FL (ref 6–12)
POTASSIUM SERPL-SCNC: 3.8 MMOL/L (ref 3.7–5.3)
PTH INTACT: 30.7 PG/ML (ref 14–72)
RBC # BLD: 3.9 M/UL (ref 4–5.2)
SEG NEUTROPHILS: 55 % (ref 36–66)
SEGMENTED NEUTROPHILS ABSOLUTE COUNT: 3.63 K/UL (ref 1.3–9.1)
SODIUM BLD-SCNC: 140 MMOL/L (ref 135–144)
THYROXINE, FREE: 1.05 NG/DL (ref 0.93–1.7)
TOTAL IRON BINDING CAPACITY: 399 UG/DL (ref 250–450)
TOTAL PROTEIN: 6.8 G/DL (ref 6.4–8.3)
TRIGL SERPL-MCNC: 43 MG/DL
TSH SERPL DL<=0.05 MIU/L-ACNC: 1.38 UIU/ML (ref 0.3–5)
UNSATURATED IRON BINDING CAPACITY: 374 UG/DL (ref 112–347)
VITAMIN B-12: 587 PG/ML (ref 232–1245)
VITAMIN D 25-HYDROXY: 28.1 NG/ML
WBC # BLD: 6.6 K/UL (ref 3.5–11)

## 2023-01-02 PROCEDURE — 82306 VITAMIN D 25 HYDROXY: CPT

## 2023-01-02 PROCEDURE — 84425 ASSAY OF VITAMIN B-1: CPT

## 2023-01-02 PROCEDURE — 85025 COMPLETE CBC W/AUTO DIFF WBC: CPT

## 2023-01-02 PROCEDURE — 82728 ASSAY OF FERRITIN: CPT

## 2023-01-02 PROCEDURE — 80053 COMPREHEN METABOLIC PANEL: CPT

## 2023-01-02 PROCEDURE — 84439 ASSAY OF FREE THYROXINE: CPT

## 2023-01-02 PROCEDURE — 83970 ASSAY OF PARATHORMONE: CPT

## 2023-01-02 PROCEDURE — 80061 LIPID PANEL: CPT

## 2023-01-02 PROCEDURE — 84590 ASSAY OF VITAMIN A: CPT

## 2023-01-02 PROCEDURE — 82607 VITAMIN B-12: CPT

## 2023-01-02 PROCEDURE — 36415 COLL VENOUS BLD VENIPUNCTURE: CPT

## 2023-01-02 PROCEDURE — 84443 ASSAY THYROID STIM HORMONE: CPT

## 2023-01-02 PROCEDURE — 83036 HEMOGLOBIN GLYCOSYLATED A1C: CPT

## 2023-01-02 PROCEDURE — 84630 ASSAY OF ZINC: CPT

## 2023-01-02 PROCEDURE — 82746 ASSAY OF FOLIC ACID SERUM: CPT

## 2023-01-02 PROCEDURE — 83735 ASSAY OF MAGNESIUM: CPT

## 2023-01-02 PROCEDURE — 83550 IRON BINDING TEST: CPT

## 2023-01-02 PROCEDURE — 83540 ASSAY OF IRON: CPT

## 2023-01-03 DIAGNOSIS — D50.9 IRON DEFICIENCY ANEMIA, UNSPECIFIED IRON DEFICIENCY ANEMIA TYPE: ICD-10-CM

## 2023-01-03 DIAGNOSIS — E55.9 VITAMIN D DEFICIENCY: ICD-10-CM

## 2023-01-03 RX ORDER — FERROUS SULFATE 325(65) MG
325 TABLET ORAL
Qty: 90 TABLET | Refills: 1 | Status: SHIPPED | OUTPATIENT
Start: 2023-01-03

## 2023-01-03 RX ORDER — ERGOCALCIFEROL 1.25 MG/1
50000 CAPSULE ORAL WEEKLY
Qty: 8 CAPSULE | Refills: 0 | Status: SHIPPED | OUTPATIENT
Start: 2023-01-03 | End: 2023-02-22

## 2023-01-04 ENCOUNTER — TELEPHONE (OUTPATIENT)
Dept: OBGYN CLINIC | Age: 38
End: 2023-01-04

## 2023-01-04 LAB — ZINC: 81.3 UG/DL (ref 60–120)

## 2023-01-05 ENCOUNTER — INITIAL CONSULT (OUTPATIENT)
Dept: ONCOLOGY | Age: 38
End: 2023-01-05
Payer: MEDICARE

## 2023-01-05 ENCOUNTER — TELEPHONE (OUTPATIENT)
Dept: ONCOLOGY | Age: 38
End: 2023-01-05

## 2023-01-05 VITALS
WEIGHT: 273.5 LBS | HEART RATE: 81 BPM | DIASTOLIC BLOOD PRESSURE: 72 MMHG | BODY MASS INDEX: 48.45 KG/M2 | TEMPERATURE: 97.6 F | SYSTOLIC BLOOD PRESSURE: 127 MMHG

## 2023-01-05 DIAGNOSIS — K90.9 IRON MALABSORPTION: ICD-10-CM

## 2023-01-05 DIAGNOSIS — D50.9 MICROCYTIC ANEMIA: Primary | ICD-10-CM

## 2023-01-05 DIAGNOSIS — D50.8 IRON DEFICIENCY ANEMIA SECONDARY TO INADEQUATE DIETARY IRON INTAKE: ICD-10-CM

## 2023-01-05 LAB
RETINYL PALMITATE: 0.03 MG/L (ref 0–0.1)
VITAMIN A LEVEL: 0.36 MG/L (ref 0.3–1.2)
VITAMIN A, INTERP: NORMAL
VITAMIN B1 WHOLE BLOOD: 61 NMOL/L (ref 70–180)

## 2023-01-05 PROCEDURE — G8417 CALC BMI ABV UP PARAM F/U: HCPCS | Performed by: INTERNAL MEDICINE

## 2023-01-05 PROCEDURE — 99202 OFFICE O/P NEW SF 15 MIN: CPT | Performed by: INTERNAL MEDICINE

## 2023-01-05 PROCEDURE — 3074F SYST BP LT 130 MM HG: CPT | Performed by: INTERNAL MEDICINE

## 2023-01-05 PROCEDURE — G8427 DOCREV CUR MEDS BY ELIG CLIN: HCPCS | Performed by: INTERNAL MEDICINE

## 2023-01-05 PROCEDURE — G8484 FLU IMMUNIZE NO ADMIN: HCPCS | Performed by: INTERNAL MEDICINE

## 2023-01-05 PROCEDURE — 3078F DIAST BP <80 MM HG: CPT | Performed by: INTERNAL MEDICINE

## 2023-01-05 PROCEDURE — 99245 OFF/OP CONSLTJ NEW/EST HI 55: CPT | Performed by: INTERNAL MEDICINE

## 2023-01-05 RX ORDER — DIPHENHYDRAMINE HYDROCHLORIDE 50 MG/ML
50 INJECTION INTRAMUSCULAR; INTRAVENOUS
OUTPATIENT
Start: 2023-01-05

## 2023-01-05 RX ORDER — SODIUM CHLORIDE 9 MG/ML
INJECTION, SOLUTION INTRAVENOUS CONTINUOUS
OUTPATIENT
Start: 2023-01-05

## 2023-01-05 RX ORDER — HEPARIN SODIUM (PORCINE) LOCK FLUSH IV SOLN 100 UNIT/ML 100 UNIT/ML
500 SOLUTION INTRAVENOUS PRN
OUTPATIENT
Start: 2023-01-05

## 2023-01-05 RX ORDER — ALBUTEROL SULFATE 90 UG/1
4 AEROSOL, METERED RESPIRATORY (INHALATION) PRN
OUTPATIENT
Start: 2023-01-05

## 2023-01-05 RX ORDER — EPINEPHRINE 1 MG/ML
0.3 INJECTION, SOLUTION, CONCENTRATE INTRAVENOUS PRN
OUTPATIENT
Start: 2023-01-05

## 2023-01-05 RX ORDER — SODIUM CHLORIDE 0.9 % (FLUSH) 0.9 %
5-40 SYRINGE (ML) INJECTION PRN
OUTPATIENT
Start: 2023-01-05

## 2023-01-05 RX ORDER — SODIUM CHLORIDE 9 MG/ML
5-250 INJECTION, SOLUTION INTRAVENOUS PRN
OUTPATIENT
Start: 2023-01-05

## 2023-01-05 RX ORDER — FAMOTIDINE 10 MG/ML
20 INJECTION, SOLUTION INTRAVENOUS
OUTPATIENT
Start: 2023-01-05

## 2023-01-05 RX ORDER — SODIUM CHLORIDE, SODIUM LACTATE, POTASSIUM CHLORIDE, CALCIUM CHLORIDE 600; 310; 30; 20 MG/100ML; MG/100ML; MG/100ML; MG/100ML
1000 INJECTION, SOLUTION INTRAVENOUS CONTINUOUS
Status: CANCELLED | OUTPATIENT
Start: 2023-01-05

## 2023-01-05 RX ORDER — ONDANSETRON 2 MG/ML
8 INJECTION INTRAMUSCULAR; INTRAVENOUS
OUTPATIENT
Start: 2023-01-05

## 2023-01-05 RX ORDER — ACETAMINOPHEN 325 MG/1
650 TABLET ORAL
OUTPATIENT
Start: 2023-01-05

## 2023-01-05 NOTE — LETTER
_    Madelin Ro MD    2023     Rashawn Brice    [address] [facility] [address]    Dear Dr Ruiz Villarreal: Thank you for referring [facility], 1985, to me for evaluation. Below are the relevant portions of my assessment and plan of care. Ms. [facility] is a very pleasant 40 y.o. female with history of multiple co morbidities as listed. Patient is referred for evaluation and further management of anemia. The patient had chronic iron deficiency secondary to heavy menses. However she developed fibroid uterus and she had significant increase in her symptoms. She has severe bleeding. She had recent hospitalization and of 2022 with hemoglobin below 7. She had blood transfusion twice. Patient continues to have weakness and fatigue. Here menses are easier with the use of Depo injections. She continues to have shortness of breath on exertion. She is craving ice. She has significant fatigue. No other source of bleeding. No melena or hematochezia. No hematemesis. She previously had no response to oral iron. Martell Pearce PAST MEDICAL HISTORY: has a past medical history of Class 3 severe obesity due to excess calories without serious comorbidity with body mass index (BMI) of 60.0 to 69.9 in adult [facility]), Essential hypertension, Hyperlipidemia, Hyperlipidemia, Pneumonia due to COVID-19 virus, Prediabetes, and Under care of team.    PAST SURGICAL HISTORY: has a past surgical history that includes  section; intrauterine device insertion (2021); Upper gastrointestinal endoscopy (N/A, 2021); [place] tooth extraction; Sleeve Gastrectomy (2022); and Sleeve Gastrectomy (N/A, 3/7/2022).      CURRENT MEDICATIONS:  has a current medication list which includes the following prescription(s): vitamin d, ferrous sulfate, lubiprostone, ondansetron, aspirin, atorvastatin, pantoprazole, sucralfate, therapeutic multivitamin-minerals, calcium carbonate, and promethazine. ALLERGIES:  is allergic to vancomycin. FAMILY HISTORY: g father colon cancer. G mother breast and brain cancer. Otherwise negative for any hematological or oncological conditions. SOCIAL HISTORY:  reports that she has never smoked. She has never used smokeless tobacco. She reports that she does not drink alcohol and does not use drugs. REVIEW OF SYSTEMS:     · General: Positive for weakness and fatigue. No unanticipated weight loss or decreased appetite. No fever or chills. · Eyes: No blurred vision, eye pain or double vision. · Ears: No hearing problems or drainage. No tinnitus. · Throat: No sore throat, problems with swallowing or dysphagia. · Respiratory: No cough, sputum or hemoptysis. Positive for exertional shortness of breath. No pleuritic chest pain. · Cardiovascular: No chest pain, orthopnea or PND. No lower extremity edema. No palpitation. · Gastrointestinal: No problems with swallowing. No abdominal pain or bloating. No nausea or vomiting. No diarrhea or constipation. No GI bleeding. · Genitourinary: No dysuria, hematuria, frequency or urgency. · Musculoskeletal: No muscle aches or pains. No limitation of movement. No back pain. No gait disturbance, No joint complaints. · Dermatologic: No skin rashes or pruritus. No skin lesions or discolorations. · Psychiatric: No depression, anxiety, or stress or signs of schizophrenia. No change in mood or affect. · Hematologic: No history of bleeding tendency. No bruises or ecchymosis. No history of clotting problems. · Infectious disease: No fever, chills or frequent infections. · Endocrine: No polydipsia or polyuria. No temperature intolerance. · Neurologic: No headaches or dizziness. No weakness or numbness of the extremities. No changes in balance, coordination,  memory, mentation, behavior. · Allergic/Immunologic: No nasal congestion or hives. No repeated infections.        PHYSICAL EXAM: The patient is not in acute distress. Vital signs: Blood pressure 127/72, pulse 81, temperature 97.6 °F (36.4 °C), temperature source Temporal, weight 273 lb 8 oz (124.1 kg), not currently breastfeeding.      General appearance - well appearing, not in pain or distress  Mental status - good mood, alert and oriented  Eyes - pupils equal and reactive, extraocular eye movements intact  Ears - bilateral TM's and external ear canals normal  Nose - normal and patent, no erythema, discharge or polyps  Mouth - mucous membranes moist, pharynx normal without lesions  Neck - supple, no significant adenopathy  Lymphatics - no palpable lymphadenopathy, no hepatosplenomegaly  Chest - clear to auscultation, no wheezes, rales or rhonchi, symmetric air entry  Heart - normal rate, regular rhythm, normal S1, S2, no murmurs, rubs, clicks or gallops  Abdomen - soft, nontender, nondistended, no masses or organomegaly  Neurological - alert, oriented, normal speech, no focal findings or movement disorder noted  Musculoskeletal - no joint tenderness, deformity or swelling  Extremities - peripheral pulses normal, no pedal edema, no clubbing or cyanosis  Skin - normal coloration and turgor, no rashes, no suspicious skin lesions noted     Review of Diagnostic data:   Lab Results   Component Value Date    WBC 6.6 01/02/2023    HGB 9.3 (L) 01/02/2023    HCT 29.4 (L) 01/02/2023    MCV 75.5 (L) 01/02/2023     01/02/2023       Chemistry        Component Value Date/Time     01/02/2023 0710    K 3.8 01/02/2023 0710     01/02/2023 0710    CO2 28 01/02/2023 0710    BUN 16 01/02/2023 0710    CREATININE 0.70 01/02/2023 0710        Component Value Date/Time    CALCIUM 9.1 01/02/2023 0710    ALKPHOS 80 01/02/2023 0710    AST 12 01/02/2023 0710    ALT 11 01/02/2023 0710    BILITOT 0.2 (L) 01/02/2023 0710          [facility]      IMPRESSION:   Microcytic anemia  Iron deficiency anemia  Lack of response to oral iron  Fibroid uterus  Probable iron malabsorption    PLAN: Records, labs and images were reviewed and discussed with the patient. I explained to the patient the nature of his problem with anemia and underlying cause. Obviously she had significant bleeding secondary to fibroid but also she had with absorption and lack of response to oral iron. So she will benefit from IV iron infusion especially that she is still quite symptomatic. Explained benefits and side effects. She agreed. Patient will continue to follow-up with her OB/GYN for further management of fibroid uterus. Possible need for hysterectomy. We will see her in 3 to 4 months with repeated labs including iron studies. Sooner for any problems. If you have questions, please do not hesitate to call me. I look forward to following Analilia Rowell along with you. Sincerely,                            [address] [facility]                            This note is created with the assistance of a speech recognition program.  While intending to generate a document that actually reflects the content of the visit, the document can still have some errors including those of syntax and sound a like substitutions which may escape proof reading. It such instances, actual meaning can be extrapolated by contextual diversion.

## 2023-01-05 NOTE — PROGRESS NOTES
_           Ms. Slime Walker is a very pleasant 40 y.o. female with history of multiple co morbidities as listed. Patient is referred for evaluation and further management of anemia. The patient had chronic iron deficiency secondary to heavy menses. However she developed fibroid uterus and she had significant increase in her symptoms. She has severe bleeding. She had recent hospitalization and of 2022 with hemoglobin below 7. She had blood transfusion twice. Patient continues to have weakness and fatigue. Here menses are easier with the use of Depo injections. She continues to have shortness of breath on exertion. She is craving ice. She has significant fatigue. No other source of bleeding. No melena or hematochezia. No hematemesis. She previously had no response to oral iron. Brenda Salcedo PAST MEDICAL HISTORY: has a past medical history of Class 3 severe obesity due to excess calories without serious comorbidity with body mass index (BMI) of 60.0 to 69.9 in [facility]), Essential hypertension, Hyperlipidemia, Hyperlipidemia, Pneumonia due to COVID-19 virus, Prediabetes, and Under care of team.    PAST SURGICAL HISTORY: has a past surgical history that includes  section; intrauterine device insertion (2021); Upper gastrointestinal endoscopy (N/A, 2021); [place] tooth extraction; Sleeve Gastrectomy (2022); and Sleeve Gastrectomy (N/A, 3/7/2022). CURRENT MEDICATIONS:  has a current medication list which includes the following prescription(s): vitamin d, ferrous sulfate, lubiprostone, ondansetron, aspirin, atorvastatin, pantoprazole, sucralfate, therapeutic multivitamin-minerals, calcium carbonate, and promethazine. ALLERGIES:  is allergic to vancomycin. FAMILY HISTORY: g father colon cancer. G mother breast and brain cancer.  Otherwise negative for any hematological or oncological conditions. SOCIAL HISTORY:  reports that she has never smoked. She has never used smokeless tobacco. She reports that she does not drink alcohol and does not use drugs. REVIEW OF SYSTEMS:     General: Positive for weakness and fatigue. No unanticipated weight loss or decreased appetite. No fever or chills. Eyes: No blurred vision, eye pain or double vision. Ears: No hearing problems or drainage. No tinnitus. Throat: No sore throat, problems with swallowing or dysphagia. Respiratory: No cough, sputum or hemoptysis. Positive for exertional shortness of breath. No pleuritic chest pain. Cardiovascular: No chest pain, orthopnea or PND. No lower extremity edema. No palpitation. Gastrointestinal: No problems with swallowing. No abdominal pain or bloating. No nausea or vomiting. No diarrhea or constipation. No GI bleeding. Genitourinary: No dysuria, hematuria, frequency or urgency. Musculoskeletal: No muscle aches or pains. No limitation of movement. No back pain. No gait disturbance, No joint complaints. Dermatologic: No skin rashes or pruritus. No skin lesions or discolorations. Psychiatric: No depression, anxiety, or stress or signs of schizophrenia. No change in mood or affect. Hematologic: No history of bleeding tendency. No bruises or ecchymosis. No history of clotting problems. Infectious disease: No fever, chills or frequent infections. Endocrine: No polydipsia or polyuria. No temperature intolerance. Neurologic: No headaches or dizziness. No weakness or numbness of the extremities. No changes in balance, coordination,  memory, mentation, behavior. Allergic/Immunologic: No nasal congestion or hives. No repeated infections. PHYSICAL EXAM:  The patient is not in acute distress. Vital signs: Blood pressure 127/72, pulse 81, temperature 97.6 °F (36.4 °C), temperature source Temporal, weight 273 lb 8 oz (124.1 kg), not currently breastfeeding.      General appearance - well appearing, not in pain or distress  Mental status - good mood, alert and oriented  Eyes - pupils equal and reactive, extraocular eye movements intact  Ears - bilateral TM's and external ear canals normal  Nose - normal and patent, no erythema, discharge or polyps  Mouth - mucous membranes moist, pharynx normal without lesions  Neck - supple, no significant adenopathy  Lymphatics - no palpable lymphadenopathy, no hepatosplenomegaly  Chest - clear to auscultation, no wheezes, rales or rhonchi, symmetric air entry  Heart - normal rate, regular rhythm, normal S1, S2, no murmurs, rubs, clicks or gallops  Abdomen - soft, nontender, nondistended, no masses or organomegaly  Neurological - alert, oriented, normal speech, no focal findings or movement disorder noted  Musculoskeletal - no joint tenderness, deformity or swelling  Extremities - peripheral pulses normal, no pedal edema, no clubbing or cyanosis  Skin - normal coloration and turgor, no rashes, no suspicious skin lesions noted     Review of Diagnostic data:   Lab Results   Component Value Date    WBC 6.6 01/02/2023    HGB 9.3 (L) 01/02/2023    HCT 29.4 (L) 01/02/2023    MCV 75.5 (L) 01/02/2023     01/02/2023       Chemistry        Component Value Date/Time     01/02/2023 0710    K 3.8 01/02/2023 0710     01/02/2023 0710    CO2 28 01/02/2023 0710    BUN 16 01/02/2023 0710    CREATININE 0.70 01/02/2023 0710        Component Value Date/Time    CALCIUM 9.1 01/02/2023 0710    ALKPHOS 80 01/02/2023 0710    AST 12 01/02/2023 0710    ALT 11 01/02/2023 0710    BILITOT 0.2 (L) 01/02/2023 0710          [unfilled]      IMPRESSION:   Microcytic anemia  Iron deficiency anemia  Lack of response to oral iron  Fibroid uterus  Probable iron malabsorption    PLAN: Records, labs and images were reviewed and discussed with the patient. I explained to the patient the nature of his problem with anemia and underlying cause.   Obviously she had significant bleeding secondary to fibroid but also she had with absorption and lack of response to oral iron. So she will benefit from IV iron infusion especially that she is still quite symptomatic. Explained benefits and side effects. She agreed. Patient will continue to follow-up with her OB/GYN for further management of fibroid uterus. Possible need for hysterectomy. We will see her in 3 to 4 months with repeated labs including iron studies. Sooner for any problems.

## 2023-01-05 NOTE — TELEPHONE ENCOUNTER
JULIO HERE FOR CONSULTATION  IV iron infusion soon  RV 3-4 months with CBC, iron studies before RV  NEW ORDER IRON IS PENDING PRECERT  LABS CDP FERRITIN FE TIBC TO BE DONE ON 3/30/23 @ [address]  MD VISIT 4/4/23 @ 8:15AM  AVS PRINTED W/ INSTRUCTIONS AND GIVEN TO PT ON EXIT

## 2023-01-05 NOTE — TELEPHONE ENCOUNTER
Spoke to Jonathon she states that she has not been bleeding for a while so she has changed her mind on the hysterectomy.

## 2023-01-05 NOTE — DISCHARGE INSTRUCTIONS
Pre-operative Instructions           NOTHING to eat or drink after midnight the night prior to surgery   (This includes gum, candy, mints, chewing tobacco, etc). Please arrive at the surgery center (Entrance B) by 5:30-5:45 AM on 1/26/2023 (or as directed by your surgeon's office). See Directons to Surgery Center below. Please take only the following medication(s) the day of surgery with a small sip of water: Pantoprazole (Protonix)    Please stop any blood thinning medications  AS DIRECTED BY PRESCRIBING PROVIDER! : Aspirin  Failure to stop these medications as instructed (too soon or too late) may result in injury to you, or your surgery may need to be rescheduled. Below is a list of some examples for your reference. Antiplatelets : (stop blood cells (called platelets) from sticking together and forming a blood clot):   Aspirin (Bufferin, Ecotrin), Clopidogrel (Plavix), Ticagrelor (Brilinta), Prasugrel (Effient), Dipyridamole/aspirin (Aggrenox), Ticlopidine (Ticlid), Eptifibatide (Integrilin)    Anticoagulants: (slow down your body's process of making clots): Warfarin (Coumadin), Rivaroxaban (Xarelto), Dabigatran (Pradaxa), Apixaban (Eliquis), Edoxaban (Savaysa), Heparin/Enoxaparin (Lovenox), Fondaparinux (Arixtra)    NSAIDS: Aspirin (Bufferin, Ecotrin), Ibuprofen (Motrin, Nuprin,Advil), Naproxen (Aleve),Meloxicam (Mobic), Celecoxib (Celebrex), Diclofenac (Voltaren), Etodolac (Lodine), Indomethacin, Ketorolac, Nabumetone, Oxaprozin (Daypro), Piroxicam (Feldene), Excedrin (has aspirin in it)    Herbal supplements: Bromelain, Cinnamon, [employer], [place] (female ginseng), Fish oil, Garlic, Dahlia,Ginkgo biloba, Grape seed extract, Turmeric, Vitamin E, etc....)    You may continue the rest of your medications through the night before surgery unless instructed otherwise. If applicable:   Do not take diabetic medications on the day of surgery.   Please use/bring daily inhalers with you 1/9/23  11:32 AM      ___________________  _______________________  Signature (Provider)              Signature (Patient)     Day of Surgery/Procedure    As a patient at [facility] you can expect quality medical and nursing care that is centered on your individual needs. Our goal is to make your surgical experience as comfortable as possible    Directions to the [address] [place] [address]. [facility] is located in the Emergency Room parking lot on [facility] or there is additional parking across the street. The address is [address] [place] [address]. Please check in at the [facility] upon arrival.     Patient Instructions  In case of any illness please contact your surgeons office for instructions prior to coming to the hospital.    Due to current restrictions you are only allowed 2 adults to be with you. Masks are to be worn and screening will take place on arrival.     Bring your current list of medications, vitamins, herbals and anything you might take on an  \"as needed \" basis. It is important we have a correct list with dosages and frequencies. Please verify your list with your medications at home or bring all of your bottles with you. If you have been given a blood band be sure to bring it with you on the day of surgery. Do not put it on or close the clasp. Use and bring any inhalers if you are currently using one. It is ok to brush your teeth but do not swallow any water. You may be required to provide a urine sample upon your arrival to the pre-op area, so please take this into consideration prior to using the restroom. No jewelry or piercing's to be worn into surgery because you might be injured because of them. No contact lenses to be worn. It is ok to wear your glasses in pre op but they will be removed prior to going into the operating room.     Dentures/partials will likely need to be removed in pre op depending on your type of anesthesia, please do not use adhesives on the day of surgery. Bathe as instructed with the special soap given to you. No lotions, powders or creams after bathing. Wear loose comfortable clothing / shoes that are easy to get on and off over wounds or casts. If you are going to be admitted after surgery please bring your Cpap or BiPap if you have it at home. Keep patient belongings to a minimum and leave valuables at home. If you are staying overnight with us, please bring a SMALL bag of personal items. We cannot accommodate large items, like suitcases. If you are going home after anesthesia or sedation then you must have a responsible adult with you to take you home and to be with you for the first 24 hours after surgery. If you do not have someone to stay with you your surgery might get cancelled. Please contact your surgeon's office to see if other arrangements can be made if you can not find someone to stay with you. If you have any other questions on the day of surgery please contact [phone] or [phone]    If you have any other questions regarding your procedure/surgery please call  your surgeon's office.

## 2023-01-09 ENCOUNTER — TELEPHONE (OUTPATIENT)
Dept: ONCOLOGY | Age: 38
End: 2023-01-09

## 2023-01-09 ENCOUNTER — HOSPITAL ENCOUNTER (OUTPATIENT)
Dept: PREADMISSION TESTING | Age: 38
Discharge: HOME | End: 2023-01-09

## 2023-01-09 NOTE — TELEPHONE ENCOUNTER
I TRIED TO CALL SHILAALEX TO SCHEDULE HER IRON INFUSIONS AND HAD TO LEAVE A MESSAGE TO CALL THE OFFICE TO SCHEDULE.

## 2023-01-19 ENCOUNTER — HOSPITAL ENCOUNTER (OUTPATIENT)
Dept: INFUSION THERAPY | Facility: MEDICAL CENTER | Age: 38
Discharge: HOME | End: 2023-01-19
Payer: MEDICARE

## 2023-01-19 VITALS — TEMPERATURE: 98.1 F | SYSTOLIC BLOOD PRESSURE: 121 MMHG | DIASTOLIC BLOOD PRESSURE: 73 MMHG | HEART RATE: 95 BPM

## 2023-01-19 DIAGNOSIS — K90.9 IRON MALABSORPTION: ICD-10-CM

## 2023-01-19 DIAGNOSIS — D50.8 IRON DEFICIENCY ANEMIA SECONDARY TO INADEQUATE DIETARY IRON INTAKE: Primary | ICD-10-CM

## 2023-01-19 PROCEDURE — 2580000003 HC RX 258: Performed by: INTERNAL MEDICINE

## 2023-01-19 PROCEDURE — 6360000002 HC RX W HCPCS: Performed by: INTERNAL MEDICINE

## 2023-01-19 PROCEDURE — 96365 THER/PROPH/DIAG IV INF INIT: CPT

## 2023-01-19 RX ORDER — SODIUM CHLORIDE 9 MG/ML
5-250 INJECTION, SOLUTION INTRAVENOUS PRN
Status: CANCELLED | OUTPATIENT
Start: 2023-01-26

## 2023-01-19 RX ORDER — DIPHENHYDRAMINE HYDROCHLORIDE 50 MG/ML
50 INJECTION INTRAMUSCULAR; INTRAVENOUS
Status: CANCELLED | OUTPATIENT
Start: 2023-01-26

## 2023-01-19 RX ORDER — HEPARIN SODIUM (PORCINE) LOCK FLUSH IV SOLN 100 UNIT/ML 100 UNIT/ML
500 SOLUTION INTRAVENOUS PRN
Status: CANCELLED | OUTPATIENT
Start: 2023-01-26

## 2023-01-19 RX ORDER — ACETAMINOPHEN 325 MG/1
650 TABLET ORAL
Status: CANCELLED | OUTPATIENT
Start: 2023-01-26

## 2023-01-19 RX ORDER — SODIUM CHLORIDE 9 MG/ML
5-250 INJECTION, SOLUTION INTRAVENOUS PRN
Status: DISCONTINUED | OUTPATIENT
Start: 2023-01-19 | End: 2023-01-20 | Stop reason: HOSPADM

## 2023-01-19 RX ORDER — FAMOTIDINE 10 MG/ML
20 INJECTION, SOLUTION INTRAVENOUS
Status: CANCELLED | OUTPATIENT
Start: 2023-01-26

## 2023-01-19 RX ORDER — ONDANSETRON 2 MG/ML
8 INJECTION INTRAMUSCULAR; INTRAVENOUS
Status: CANCELLED | OUTPATIENT
Start: 2023-01-26

## 2023-01-19 RX ORDER — ALBUTEROL SULFATE 90 UG/1
4 AEROSOL, METERED RESPIRATORY (INHALATION) PRN
Status: CANCELLED | OUTPATIENT
Start: 2023-01-26

## 2023-01-19 RX ORDER — SODIUM CHLORIDE 0.9 % (FLUSH) 0.9 %
5-40 SYRINGE (ML) INJECTION PRN
Status: CANCELLED | OUTPATIENT
Start: 2023-01-26

## 2023-01-19 RX ORDER — SODIUM CHLORIDE 9 MG/ML
INJECTION, SOLUTION INTRAVENOUS CONTINUOUS
Status: CANCELLED | OUTPATIENT
Start: 2023-01-26

## 2023-01-19 RX ADMIN — FERRIC CARBOXYMALTOSE INJECTION 750 MG: 50 INJECTION, SOLUTION INTRAVENOUS at 09:29

## 2023-01-19 RX ADMIN — SODIUM CHLORIDE 20 ML/HR: 9 INJECTION, SOLUTION INTRAVENOUS at 09:19

## 2023-01-19 NOTE — PROGRESS NOTES
Pt arrive ambulatory for 1 of 2 injectafer infusion  Denies any complaint or concern. Vitals as charted. Peripheral IV established per policy. Injectafer infused with no sign of adverse reaction; line flushed. Pt educated on [place] [employer] provided . Pt verbalizes understanding of possible adverse reaction; denies questions. IV removed , pressure dressing applied.   Pt discharged

## 2023-01-26 ENCOUNTER — HOSPITAL ENCOUNTER (OUTPATIENT)
Dept: INFUSION THERAPY | Facility: MEDICAL CENTER | Age: 38
Discharge: HOME | End: 2023-01-26
Payer: MEDICARE

## 2023-01-26 VITALS
HEART RATE: 71 BPM | SYSTOLIC BLOOD PRESSURE: 130 MMHG | RESPIRATION RATE: 16 BRPM | DIASTOLIC BLOOD PRESSURE: 84 MMHG | TEMPERATURE: 97.8 F

## 2023-01-26 DIAGNOSIS — D50.8 IRON DEFICIENCY ANEMIA SECONDARY TO INADEQUATE DIETARY IRON INTAKE: Primary | ICD-10-CM

## 2023-01-26 DIAGNOSIS — K90.9 IRON MALABSORPTION: ICD-10-CM

## 2023-01-26 PROCEDURE — 6360000002 HC RX W HCPCS: Performed by: INTERNAL MEDICINE

## 2023-01-26 PROCEDURE — 2580000003 HC RX 258: Performed by: INTERNAL MEDICINE

## 2023-01-26 PROCEDURE — 96365 THER/PROPH/DIAG IV INF INIT: CPT

## 2023-01-26 RX ORDER — SODIUM CHLORIDE 9 MG/ML
INJECTION, SOLUTION INTRAVENOUS CONTINUOUS
OUTPATIENT
Start: 2023-02-02

## 2023-01-26 RX ORDER — SODIUM CHLORIDE 9 MG/ML
5-250 INJECTION, SOLUTION INTRAVENOUS PRN
Status: CANCELLED | OUTPATIENT
Start: 2023-02-02

## 2023-01-26 RX ORDER — DIPHENHYDRAMINE HYDROCHLORIDE 50 MG/ML
50 INJECTION INTRAMUSCULAR; INTRAVENOUS
OUTPATIENT
Start: 2023-02-02

## 2023-01-26 RX ORDER — ONDANSETRON 2 MG/ML
8 INJECTION INTRAMUSCULAR; INTRAVENOUS
OUTPATIENT
Start: 2023-02-02

## 2023-01-26 RX ORDER — SODIUM CHLORIDE 9 MG/ML
5-250 INJECTION, SOLUTION INTRAVENOUS PRN
OUTPATIENT
Start: 2023-02-02

## 2023-01-26 RX ORDER — ALBUTEROL SULFATE 90 UG/1
4 AEROSOL, METERED RESPIRATORY (INHALATION) PRN
OUTPATIENT
Start: 2023-02-02

## 2023-01-26 RX ORDER — FAMOTIDINE 10 MG/ML
20 INJECTION, SOLUTION INTRAVENOUS
OUTPATIENT
Start: 2023-02-02

## 2023-01-26 RX ORDER — ACETAMINOPHEN 325 MG/1
650 TABLET ORAL
OUTPATIENT
Start: 2023-02-02

## 2023-01-26 RX ORDER — SODIUM CHLORIDE 0.9 % (FLUSH) 0.9 %
5-40 SYRINGE (ML) INJECTION PRN
OUTPATIENT
Start: 2023-02-02

## 2023-01-26 RX ORDER — HEPARIN SODIUM (PORCINE) LOCK FLUSH IV SOLN 100 UNIT/ML 100 UNIT/ML
500 SOLUTION INTRAVENOUS PRN
OUTPATIENT
Start: 2023-02-02

## 2023-01-26 RX ORDER — SODIUM CHLORIDE 9 MG/ML
5-250 INJECTION, SOLUTION INTRAVENOUS PRN
Status: DISCONTINUED | OUTPATIENT
Start: 2023-01-26 | End: 2023-01-27 | Stop reason: HOSPADM

## 2023-01-26 RX ADMIN — SODIUM CHLORIDE 20 ML/HR: 9 INJECTION, SOLUTION INTRAVENOUS at 10:22

## 2023-01-26 RX ADMIN — FERRIC CARBOXYMALTOSE INJECTION 750 MG: 50 INJECTION, SOLUTION INTRAVENOUS at 10:28

## 2023-01-26 NOTE — PROGRESS NOTES
Pt arrive ambulatory for 2 of 2 injectafer infusion  Denies any complaint or concern. Vitals as charted. Peripheral IV established per policy. Injectafer infused with no sign of adverse reaction; line flushed. Pt educated on [place] [employer] provided . Pt verbalizes understanding of possible adverse reaction; denies questions. IV removed , pressure dressing applied.   Pt discharged

## 2023-02-27 ENCOUNTER — OFFICE VISIT (OUTPATIENT)
Dept: NEUROLOGY | Age: 38
End: 2023-02-27
Payer: MEDICAID

## 2023-02-27 VITALS
HEART RATE: 67 BPM | BODY MASS INDEX: 48.37 KG/M2 | SYSTOLIC BLOOD PRESSURE: 138 MMHG | WEIGHT: 273 LBS | DIASTOLIC BLOOD PRESSURE: 79 MMHG | HEIGHT: 63 IN | OXYGEN SATURATION: 97 % | TEMPERATURE: 97.6 F

## 2023-02-27 DIAGNOSIS — I67.5 MOYA MOYA DISEASE: Primary | ICD-10-CM

## 2023-02-27 PROCEDURE — 3078F DIAST BP <80 MM HG: CPT | Performed by: PSYCHIATRY & NEUROLOGY

## 2023-02-27 PROCEDURE — 3075F SYST BP GE 130 - 139MM HG: CPT | Performed by: PSYCHIATRY & NEUROLOGY

## 2023-02-27 PROCEDURE — 99215 OFFICE O/P EST HI 40 MIN: CPT | Performed by: PSYCHIATRY & NEUROLOGY

## 2023-02-27 NOTE — PROGRESS NOTES
Endovascular Neurosurgery Clinic Note      Reason for evaluation: Radha Rogelio disease     SUBJECTIVE:   History of Chief Complaint:    Ms. Samara Rodriguez is a 39 y/o f with past medical history significant for obesity, hypertension, moyamoya syndrome presented for first hospital follow-up. Briefly, patient presented to [address] [place] [address] with acute onset transient slurred speech, right facial numbness and right arm weakness and numbness. Patient's symptoms resolved and she came back to the baseline. MRI brain was negative for any stroke. CTA head and neck showed left ICA occlusion, moyamoya vessels at left MCA M1 segment, right ICA terminus severe stenosis. During the hospital stay patient had  bleeding with Low Hb therefore DSA could not be scheduled. Patient continues to have on and off heavy uterine bleeding and was recommended to have hysterectomy. Patient is going to schedule the surgery in Jan 2023. Since discharge patient has been taking ASA 81 and did not have any strokelike symptoms. Allergies  is allergic to vancomycin. Medications  Prior to Admission medications    Medication Sig Start Date End Date Taking?  Authorizing Provider   vitamin D (ERGOCALCIFEROL) 1.25 MG (45672 UT) CAPS capsule Take 1 capsule by mouth once a week for 8 doses 1/3/23 2/27/23 Yes JULIETA Nicholson CNP   ferrous sulfate (IRON 325) 325 (65 Fe) MG tablet Take 1 tablet by mouth daily (with breakfast) 1/3/23  Yes JULIETA Nicholson CNP   lubiprostone (AMITIZA) 8 MCG CAPS capsule Take 1 capsule by mouth daily 12/15/22  Yes JULIETA Nicholson CNP   ondansetron (ZOFRAN) 4 MG tablet Take 1 tablet by mouth every 8 hours as needed for Nausea or Vomiting 12/1/22  Yes JULIETA Nicholson CNP   aspirin 81 MG EC tablet Take 1 tablet by mouth daily 11/3/22  Yes Saji Marinellis, DO   atorvastatin (LIPITOR) 80 MG tablet Take 1 tablet by mouth nightly 11/3/22  Yes Saji Shoulders, DO pantoprazole (PROTONIX) 40 MG tablet Take 1 tablet by mouth daily 22  Yes JULIETA Toney CNP   sucralfate (CARAFATE) 1 GM tablet Take 1 tablet by mouth 4 times daily 22  Yes JULIETA Toney CNP   Multiple Vitamins-Minerals (THERAPEUTIC MULTIVITAMIN-MINERALS) tablet Take 1 tablet by mouth in the morning and at bedtime MVI from [facility]   Yes Historical Provider, MD   calcium carbonate (OSCAL) 500 MG TABS tablet Take 500 mg by mouth daily Bariatric fusion   Yes Historical Provider, MD   promethazine (PHENERGAN) 25 MG tablet Take 1 tablet by mouth every 6 hours as needed for Nausea 3/8/22  Yes Kerry Allred MD    Scheduled Meds:  Continuous Infusions:  PRN Meds:.  Past Medical History   has a past medical history of Class 3 severe obesity due to excess calories without serious comorbidity with body mass index (BMI) of 60.0 to 69.9 in [facility]), Essential hypertension, Hyperlipidemia, Hyperlipidemia, Pneumonia due to COVID-19 virus, Prediabetes, and Under care of team.  Past Surgical History   has a past surgical history that includes  section; intrauterine device insertion (2021); Upper gastrointestinal endoscopy (N/A, 2021); [place] tooth extraction; Sleeve Gastrectomy (2022); and Sleeve Gastrectomy (N/A, 3/7/2022). Social History   reports that she has never smoked. She has never used smokeless tobacco.   reports no history of alcohol use. reports no history of drug use. Family History  family history includes Cancer in her maternal grandfather and paternal grandmother; Depression in her mother; Diabetes in her maternal grandfather, maternal grandmother, and mother; Heart Disease in her father, maternal grandfather, and mother; High Blood Pressure in her maternal grandfather, maternal grandmother, and mother; Kidney Disease in her maternal grandmother; Other in her father and mother.     Review of Systems:  CONSTITUTIONAL:  negative for fevers, chills, fatigue and malaise    EYES:  negative for double vision, blurred vision and photophobia     HEENT:  negative for tinnitus, epistaxis and sore throat    RESPIRATORY:  negative for cough, shortness of breath, wheezing    CARDIOVASCULAR:  negative for chest pain, palpitations, syncope, edema    GASTROINTESTINAL:  negative for nausea, vomiting    GENITOURINARY:  negative for incontinence    MUSCULOSKELETAL:  negative for neck or back pain    NEUROLOGICAL:  Negative for weakness and tingling  negative for headaches and dizziness    PSYCHIATRIC:  negative for anxiety      Review of systems otherwise negative. OBJECTIVE:     Vitals:    23 1408   BP: 138/79   Pulse: 67   Temp: 97.6 °F (36.4 °C)   SpO2: 97%        General:  Gen: normal habitus, NAD  HEENT: NCAT, mucosa moist  Cvs: RRR, S1 S2 normal  Resp: symmetric unlabored breathing  Abd: s/nd/nt  Ext: no edema  Skin: no lesions seen, warm and dry    Neuro:  Gen: awake and alert, oriented x3. Lang/speech: no aphasia or dysarthria. Follows commands. CN: PERRL, EOMI, VFF, V1-3 intact, face symmetric, hearing intact, shoulder shrug symmetric, tongue midline  Motor: grossly 5/5 UE and LE b/l  Sense: LT intact in all 4 ext. Coord: FTN and HTS intact b/l  DTR: deferred  Gait: narrow base gait    NIH Stroke Scale:   1a  Level of consciousness: 0 - alert; keenly responsive   1b. LOC questions:  0 - answers both questions correctly   1c. LOC commands: 0 - performs both tasks correctly   2. Best Gaze: 0 - normal   3. Visual: 0 - no visual loss   4. Facial Palsy: 0 - normal symmetric movement   5a. Motor left arm: 0 - no drift, limb holds 90 (or 45) degrees for full 10 seconds   5b. Motor right arm: 0 - no drift, limb holds 90 (or 45) degrees for full 10 seconds   6a. Motor left le - no drift; leg holds 30 degree position for full 5 seconds   6b  Motor right le - no drift; leg holds 30 degree position for full 5 seconds   7. Limb Ataxia: 0 - absent   8. Sensory: 0 - normal; no sensory loss   9. Best Language:  0 - no aphasia, normal   10. Dysarthria: 0 - normal   11. Extinction and Inattention: 0 - no abnormality         Total:   0     MRS: 0      LABS:   Reviewed. Lab Results   Component Value Date    HGB 9.3 (L) 01/02/2023    WBC 6.6 01/02/2023     01/02/2023     01/02/2023    BUN 16 01/02/2023    CREATININE 0.70 01/02/2023    AST 12 01/02/2023    ALT 11 01/02/2023    MG 1.8 01/02/2023    APTT 28.2 10/30/2022    INR 1.1 10/30/2022      Lab Results   Component Value Date/Time    COVID19 Not Detected 08/16/2021 01:24 PM       RADIOLOGY:   Images were personally reviewed including:  CTA head and neck:   Severe stenosis in the distal supraclinoid segment of the right internal   carotid artery, with occlusion in the proximal M1 segment of the right MCA   and at origin of A1 segment of the right NEREIDA, with prominent collateral   vessels, 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 No acute abnormality or flow-limiting stenosis of the major arteries of the   neck. MRI brain:   No acute infarct, hemorrhage or intracranial mass effect    ASSESSMENT:   39 y/o f with past medical history significant for obesity, hypertension, moyamoya syndrome presented for first hospital follow-up. DSA could not be completed during the hospital stay due to anemia and  bleeding. Neuro exam was non focal. NIHSS was 0  PLAN:   --c/w ASA 81 mg daily   --will schedule outpatient DSA once patient is done with hysterectomy and  source of bleeding is taken care of. The risk and benefit of the cerebral angiography was explained at length to the patient and her family, including ut not limited to vessel injury, X-ray dye allergic reaction, kidney dysfunction, stroke and groin hematoma. ---140 mm Hg.   --Avoid hypotension in per-procedural period   --F/up with Dr. Edgardo Velasco on 03/27/23     Consultation Visit Time:  40 minutes  Patient given educational materials - see patient instructions. Discussed use, benefit, and side effects of prescribed medications. Personally reviewed imaging with patients and all questions answered. Pt voiced understanding. Patient agreed with treatment plan. Follow up as directed below. Greater than 50% of the time was for counseling and providing answer to the patient question. The findings and the plan discussed with the patient and all her questions were answered. Thank you very much for your referral, please do not hesitate to contact me with any questions.     Nicolás Garrett MD Pager: [phone]  Stroke, [facility]  [address], [place]    Stroke, [facility]  [address] [place]  Electronically signed 2/27/2023 at 2:16 PM

## 2023-03-10 ENCOUNTER — TELEPHONE (OUTPATIENT)
Dept: ONCOLOGY | Age: 38
End: 2023-03-10

## 2023-03-10 NOTE — TELEPHONE ENCOUNTER
WRITER CALLED PT MAILBOX IS FULL COULDN'T LV A MESSAGE TO RESCHEDULE PT APPT W/  [facility] GUANAKO VAUGHN  FROM 4/4/23 HOWEVER PT IS RESCHEDULED FOR 4/6/23 @ 10:45AM

## 2023-03-23 ENCOUNTER — HOSPITAL ENCOUNTER (OUTPATIENT)
Dept: INTERVENTIONAL RADIOLOGY/VASCULAR | Age: 38
Discharge: HOME | End: 2023-03-25
Payer: MEDICAID

## 2023-03-23 VITALS
HEART RATE: 71 BPM | SYSTOLIC BLOOD PRESSURE: 143 MMHG | TEMPERATURE: 97.4 F | DIASTOLIC BLOOD PRESSURE: 98 MMHG | RESPIRATION RATE: 17 BRPM | OXYGEN SATURATION: 100 %

## 2023-03-23 DIAGNOSIS — I67.5 MOYA MOYA DISEASE: ICD-10-CM

## 2023-03-23 LAB
ANION GAP: 6 MMOL/L (ref 7–16)
EGFR, POC: >60 ML/MIN/1.73M2
GLUCOSE BLD-MCNC: 90 MG/DL (ref 74–100)
POC BUN: 12 MG/DL (ref 8–26)
POC CHLORIDE: 106 MMOL/L (ref 98–107)
POC CREATININE: 0.6 MG/DL (ref 0.51–1.19)
POC HEMATOCRIT: 41 % (ref 36–46)
POC HEMOGLOBIN: 13.9 G/DL (ref 12–16)
POC POTASSIUM: 4.4 MMOL/L (ref 3.5–4.5)
POC SODIUM: 143 MMOL/L (ref 138–146)
POC TCO2: 32 MMOL/L (ref 22–30)

## 2023-03-23 PROCEDURE — 80051 ELECTROLYTE PANEL: CPT

## 2023-03-23 PROCEDURE — 6360000004 HC RX CONTRAST MEDICATION: Performed by: PSYCHIATRY & NEUROLOGY

## 2023-03-23 PROCEDURE — C1887 CATHETER, GUIDING: HCPCS

## 2023-03-23 PROCEDURE — 99152 MOD SED SAME PHYS/QHP 5/>YRS: CPT

## 2023-03-23 PROCEDURE — 36224 PLACE CATH CAROTD ART: CPT

## 2023-03-23 PROCEDURE — 99153 MOD SED SAME PHYS/QHP EA: CPT

## 2023-03-23 PROCEDURE — 82947 ASSAY GLUCOSE BLOOD QUANT: CPT

## 2023-03-23 PROCEDURE — 36226 PLACE CATH VERTEBRAL ART: CPT

## 2023-03-23 PROCEDURE — 2709999900 HC NON-CHARGEABLE SUPPLY

## 2023-03-23 PROCEDURE — C1894 INTRO/SHEATH, NON-LASER: HCPCS

## 2023-03-23 PROCEDURE — 7100000010 HC PHASE II RECOVERY - FIRST 15 MIN

## 2023-03-23 PROCEDURE — C1760 CLOSURE DEV, VASC: HCPCS

## 2023-03-23 PROCEDURE — 82565 ASSAY OF CREATININE: CPT

## 2023-03-23 PROCEDURE — 85014 HEMATOCRIT: CPT

## 2023-03-23 PROCEDURE — 6360000002 HC RX W HCPCS

## 2023-03-23 PROCEDURE — C1769 GUIDE WIRE: HCPCS

## 2023-03-23 PROCEDURE — 7100000011 HC PHASE II RECOVERY - ADDTL 15 MIN

## 2023-03-23 PROCEDURE — 36227 PLACE CATH XTRNL CAROTID: CPT

## 2023-03-23 PROCEDURE — 84520 ASSAY OF UREA NITROGEN: CPT

## 2023-03-23 RX ORDER — IODIXANOL 320 MG/ML
100 INJECTION, SOLUTION INTRAVASCULAR
Status: COMPLETED | OUTPATIENT
Start: 2023-03-23 | End: 2023-03-23

## 2023-03-23 RX ORDER — SODIUM CHLORIDE 0.9 % (FLUSH) 0.9 %
5-40 SYRINGE (ML) INJECTION EVERY 12 HOURS SCHEDULED
Status: DISCONTINUED | OUTPATIENT
Start: 2023-03-23 | End: 2023-03-26 | Stop reason: HOSPADM

## 2023-03-23 RX ORDER — ONDANSETRON 4 MG/1
4 TABLET, ORALLY DISINTEGRATING ORAL EVERY 8 HOURS PRN
Status: DISCONTINUED | OUTPATIENT
Start: 2023-03-23 | End: 2023-03-26 | Stop reason: HOSPADM

## 2023-03-23 RX ORDER — HEPARIN SODIUM 1000 [USP'U]/ML
INJECTION, SOLUTION INTRAVENOUS; SUBCUTANEOUS
Status: COMPLETED | OUTPATIENT
Start: 2023-03-23 | End: 2023-03-23

## 2023-03-23 RX ORDER — SODIUM CHLORIDE 9 MG/ML
INJECTION, SOLUTION INTRAVENOUS PRN
Status: DISCONTINUED | OUTPATIENT
Start: 2023-03-23 | End: 2023-03-26 | Stop reason: HOSPADM

## 2023-03-23 RX ORDER — IODIXANOL 320 MG/ML
100 INJECTION, SOLUTION INTRAVASCULAR
Status: CANCELLED | OUTPATIENT
Start: 2023-03-23

## 2023-03-23 RX ORDER — FENTANYL CITRATE 50 UG/ML
INJECTION, SOLUTION INTRAMUSCULAR; INTRAVENOUS
Status: COMPLETED | OUTPATIENT
Start: 2023-03-23 | End: 2023-03-23

## 2023-03-23 RX ORDER — ONDANSETRON 2 MG/ML
4 INJECTION INTRAMUSCULAR; INTRAVENOUS EVERY 6 HOURS PRN
Status: DISCONTINUED | OUTPATIENT
Start: 2023-03-23 | End: 2023-03-26 | Stop reason: HOSPADM

## 2023-03-23 RX ORDER — ACETAMINOPHEN 325 MG/1
650 TABLET ORAL EVERY 4 HOURS PRN
Status: DISCONTINUED | OUTPATIENT
Start: 2023-03-23 | End: 2023-03-26 | Stop reason: HOSPADM

## 2023-03-23 RX ORDER — SODIUM CHLORIDE 9 MG/ML
INJECTION, SOLUTION INTRAVENOUS CONTINUOUS
OUTPATIENT
Start: 2023-03-23

## 2023-03-23 RX ORDER — MIDAZOLAM HYDROCHLORIDE 2 MG/2ML
INJECTION, SOLUTION INTRAMUSCULAR; INTRAVENOUS
Status: COMPLETED | OUTPATIENT
Start: 2023-03-23 | End: 2023-03-23

## 2023-03-23 RX ORDER — SODIUM CHLORIDE 0.9 % (FLUSH) 0.9 %
5-40 SYRINGE (ML) INJECTION PRN
Status: DISCONTINUED | OUTPATIENT
Start: 2023-03-23 | End: 2023-03-26 | Stop reason: HOSPADM

## 2023-03-23 RX ADMIN — Medication 2000 MG: at 09:27

## 2023-03-23 RX ADMIN — MIDAZOLAM HYDROCHLORIDE 1 MG: 1 INJECTION, SOLUTION INTRAMUSCULAR; INTRAVENOUS at 09:52

## 2023-03-23 RX ADMIN — FENTANYL CITRATE 100 MCG: 50 INJECTION, SOLUTION INTRAMUSCULAR; INTRAVENOUS at 09:52

## 2023-03-23 RX ADMIN — HEPARIN SODIUM 2000 UNITS: 1000 INJECTION, SOLUTION INTRAVENOUS; SUBCUTANEOUS at 09:54

## 2023-03-23 RX ADMIN — IODIXANOL 78 ML: 320 INJECTION, SOLUTION INTRAVASCULAR at 11:15

## 2023-03-23 NOTE — BRIEF OP NOTE
Brief Postoperative Note                    [facility]    NEUROENDOVASCULAR SERVICE: POST-OP NOTE: 3/23/2023    Pt Name: Sarai Palomino  MRN: [id number]  YOB: 1985  Date of Procedure: 3/23/2023  Primary Care Physician: Paulina Gifford DO        Pre-Procedural Diagnosis: Moyamoya vasculopathy  Post-Procedural Diagnosis: same as above       Procedure Performed:Diagnostic Cerebral Angiogram    Surgeon:   Emmie Gale MD    Fellow:  Rachael Bueno MD and Marcin Barrett MD     Assisting Tech:  Ananda Sloan    PRE-PROCEDURAL EXAM:  Prestroke baseline mRS MODIFIED SOPHIA SCORE: 0 - No symptoms at all. 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   Pial collateral/ lepto-meningeal collateralization  right MCA posterior branches from right PCA   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 Groin checks per protocol. Peripheral pulse checks per protocol.   SBP goal 100-140 mm Hg  C/w ASA 81 mg daily  Follow

## 2023-03-23 NOTE — PROGRESS NOTES
Pt sitting up after 3 hours. Pt walked around unit w/o issues. Site clean dry and intact. Discharge paperwork given to pt. All questions answered. IV removed. Fiance at bedside. Dc paper signed.  Education Complete

## 2023-03-23 NOTE — H&P
of Systems:  CONSTITUTIONAL:  negative for fevers, chills, fatigue and malaise    EYES:  negative for double vision, blurred vision and photophobia     HEENT:  negative for tinnitus, epistaxis and sore throat    RESPIRATORY:  negative for cough, shortness of breath, wheezing    CARDIOVASCULAR:  negative for chest pain, palpitations, syncope, edema    GASTROINTESTINAL:  negative for nausea, vomiting    GENITOURINARY:  negative for incontinence    MUSCULOSKELETAL:  negative for neck or back pain    NEUROLOGICAL:  Negative for weakness and tingling  negative for headaches and dizziness    PSYCHIATRIC:  negative for anxiety      Review of systems otherwise negative. OBJECTIVE:   There were no vitals filed for this visit. General:  Gen: normal habitus, NAD  HEENT: NCAT, mucosa moist  Cvs: RRR, S1 S2 normal  Resp: symmetric unlabored breathing  Abd: s/nd/nt  Ext: no edema  Skin: no lesions seen, warm and dry    Neuro:  Gen: awake and alert, oriented x3. Lang/speech: no aphasia or dysarthria. Follows commands. CN: PERRL, EOMI, VFF, V1-3 intact, face symmetric, hearing intact, shoulder shrug symmetric, tongue midline  Motor: grossly 5/5 UE and LE b/l  Sense: LT intact in all 4 ext. Coord: FTN and HTS intact b/l  DTR: deferred  Gait: narrow base gait    NIH Stroke Scale:   1a  Level of consciousness: 0 - alert; keenly responsive   1b. LOC questions:  0 - answers both questions correctly   1c. LOC commands: 0 - performs both tasks correctly   2. Best Gaze: 0 - normal   3. Visual: 0 - no visual loss   4. Facial Palsy: 0 - normal symmetric movement   5a. Motor left arm: 0 - no drift, limb holds 90 (or 45) degrees for full 10 seconds   5b. Motor right arm: 0 - no drift, limb holds 90 (or 45) degrees for full 10 seconds   6a. Motor left le - no drift; leg holds 30 degree position for full 5 seconds   6b  Motor right le - no drift; leg holds 30 degree position for full 5 seconds   7.  Limb Ataxia: 0 - absent

## 2023-03-23 NOTE — DISCHARGE INSTRUCTIONS
Vertebral artery dissection     From [employer], the free encyclopedia   Vertebral artery dissection (abbreviated VAD, often vertebral dissection) is a dissection (a flap-like tear) of the inner lining of the vertebral artery, which is located in the neck and supplies blood to the brain. After the tear, blood enters the arterial wall and forms a blood clot, thickening the artery wall and often impeding blood flow. The symptoms of vertebral artery dissection include head and neck pain and intermittent or permanent stroke symptoms such as difficulty speaking, impaired coordination and visual loss. It is usually diagnosed with a contrast-enhanced CT or MRI scan. Vertebral dissection may occur after physical trauma to the neck, such as a blunt injury (e.g. traffic collision, direct blow to the neck), strangulation or manipulation, but may also happen spontaneously. 1-4% of spontaneous cases have a clear underlying connective tissue disorder affecting the blood vessels. Treatment is usually with either antiplatelet drugs such as aspirin or with anticoagulants such as heparin or warfarin. Discharge Instructions for angiogram  Biju Valero 62 to shower in AM. Discontinue band aid in AM.   Do not apply further band aids. Keep clean, dry and open to air. No powder or lotion. Do not soak in a pool or tub and do not swim for one week. If there is any bleeding at the catheter site, apply firm pressure with your hands until the bleeding stops. If bleeding continues after 3 minutes call 911. If there is any swelling or firm areas at your puncture site, this could be bleeding under the skin(hematoma), and if you have any concerns seek help immediately. Drink plenty of fluids after the test. This will flush the x-ray dye from your system. Return to your normal diet. The sedative will make you sleepy. Rest until the effects have worn off. Ask your doctor when you will be able to return to work.    Avoid

## 2023-03-23 NOTE — PROGRESS NOTES
Patient arrived/checked in room 12 with spouse at bedside. Side rails up x2 call light within reach. Consent signed. Procedure explained to pt. All questions answered. Neuro assessment complete. Consult to PICC ordered. Labs to be completed after IV placed. VS stable and documented. Pt states she takes medication to stop periods and physician at bedside confirmed she could not get pregnant while on that medication. No pregnancy test needed at this time. Belongings in locked cabinet.

## 2023-03-23 NOTE — PROGRESS NOTES
Pt returned to room. Educated to lay flat for 3 hrs until 1:46 pm. Neuro assessment unchanged and WNL. Side rails up x2. Call light within reach. Site clean, dry and intact. VS sequencing per protocol.

## 2023-03-30 ENCOUNTER — TELEPHONE (OUTPATIENT)
Dept: BARIATRICS/WEIGHT MGMT | Age: 38
End: 2023-03-30

## 2023-03-30 ENCOUNTER — OFFICE VISIT (OUTPATIENT)
Dept: BARIATRICS/WEIGHT MGMT | Age: 38
End: 2023-03-30
Payer: MEDICAID

## 2023-03-30 VITALS
HEIGHT: 63 IN | DIASTOLIC BLOOD PRESSURE: 88 MMHG | SYSTOLIC BLOOD PRESSURE: 138 MMHG | BODY MASS INDEX: 48.9 KG/M2 | WEIGHT: 276 LBS | HEART RATE: 70 BPM

## 2023-03-30 DIAGNOSIS — E66.01 OBESITY, CLASS III, BMI 40-49.9 (MORBID OBESITY) (HCC): ICD-10-CM

## 2023-03-30 DIAGNOSIS — Z98.84 S/P LAPAROSCOPIC SLEEVE GASTRECTOMY: ICD-10-CM

## 2023-03-30 DIAGNOSIS — E78.5 HYPERLIPIDEMIA, UNSPECIFIED HYPERLIPIDEMIA TYPE: Primary | ICD-10-CM

## 2023-03-30 DIAGNOSIS — D50.9 MICROCYTIC ANEMIA: ICD-10-CM

## 2023-03-30 DIAGNOSIS — E66.01 MORBID OBESITY WITH BMI OF 50.0-59.9, ADULT (HCC): ICD-10-CM

## 2023-03-30 DIAGNOSIS — I67.5 MOYA MOYA DISEASE: ICD-10-CM

## 2023-03-30 DIAGNOSIS — R73.03 PRE-DIABETES: ICD-10-CM

## 2023-03-30 PROCEDURE — 3075F SYST BP GE 130 - 139MM HG: CPT | Performed by: NURSE PRACTITIONER

## 2023-03-30 PROCEDURE — 3079F DIAST BP 80-89 MM HG: CPT | Performed by: NURSE PRACTITIONER

## 2023-03-30 PROCEDURE — 99213 OFFICE O/P EST LOW 20 MIN: CPT | Performed by: NURSE PRACTITIONER

## 2023-03-30 RX ORDER — ONDANSETRON 4 MG/1
4 TABLET, FILM COATED ORAL EVERY 8 HOURS PRN
Qty: 30 TABLET | Refills: 0 | Status: SHIPPED | OUTPATIENT
Start: 2023-03-30

## 2023-03-30 RX ORDER — PANTOPRAZOLE SODIUM 40 MG/1
40 TABLET, DELAYED RELEASE ORAL DAILY
Qty: 30 TABLET | Refills: 3 | Status: SHIPPED | OUTPATIENT
Start: 2023-03-30

## 2023-03-30 NOTE — PROGRESS NOTES
Post-op Bariatric Surgery Note    Subjective     Patient is 1 year s/p laparoscopic sleeve gastrectomy, down 92 lbs. Overall, doing well. Incisions well healed. Inconsistent use of MVI and calcium. MVI is making her nauseated. Physical activity includes walking. Using Amitiza for severe constipation. No pain or other current issues. Allergies: Allergies   Allergen Reactions    Vancomycin Itching     Patient felt like skin was on fire. Past Medical History:     Past Medical History:   Diagnosis Date    Class 3 severe obesity due to excess calories without serious comorbidity with body mass index (BMI) of 60.0 to 69.9 in adult [facility]) 2019    Essential hypertension 2019    managed by Dr. Georgia Salinas     Hyperlipidemia 2016    Hyperlipidemia     Pneumonia due to COVID-19 virus 2021    SOB, fatigue, cough, diarrhea, dizziness x 1 week; no hospitalization    Prediabetes     Under care of team 2022    PCP: Lul Burroughs/Macho, last visit-patient goes 2022 for clearance   .     Past Surgical History:  Past Surgical History:   Procedure Laterality Date     SECTION      INTRAUTERINE DEVICE INSERTION  2021    [address] [id number] Lot [id number] Exp 2023    SLEEVE GASTRECTOMY  2022    ROBOTIC LAPAROSCOPIC GASTRIC SLEEVE, EGD    SLEEVE GASTRECTOMY N/A 3/7/2022    XI ROBOTIC LAPAROSCOPIC GASTRIC SLEEVE, EGD performed by Mark Carter DO at [address] N/A 2021    EGD BIOPSY performed by Mark Carter DO at [address] [facility] EXTRACTION         Family History:  Family History   Problem Relation Age of Onset    Heart Disease Mother     Other Mother     Heart Disease Father     Other Father     Depression Mother     Diabetes Mother     High Blood Pressure Mother     Diabetes Maternal Grandmother     High Blood Pressure Maternal Grandmother     Kidney Disease Maternal Grandmother

## 2023-03-30 NOTE — TELEPHONE ENCOUNTER
Patient called back, discussed chewable vitamins and taking with food. Discussed importance of not drinking pop post-bariatric surgery and to fill stomach with hydrating fluids and nutrient-dense, protein-rich sources frequently throughout the day.

## 2023-03-30 NOTE — TELEPHONE ENCOUNTER
----- Message from JULIETA De La Garza CNP sent at 3/30/2023  3:50 PM EDT -----  Please call the patient re: switching vitamins. Her current MVI is causing nausea.

## 2023-04-06 ENCOUNTER — HOSPITAL ENCOUNTER (OUTPATIENT)
Age: 38
Discharge: HOME | End: 2023-04-06
Payer: MEDICAID

## 2023-04-06 ENCOUNTER — OFFICE VISIT (OUTPATIENT)
Dept: ONCOLOGY | Age: 38
End: 2023-04-06
Payer: MEDICAID

## 2023-04-06 ENCOUNTER — TELEPHONE (OUTPATIENT)
Dept: ONCOLOGY | Age: 38
End: 2023-04-06

## 2023-04-06 VITALS
DIASTOLIC BLOOD PRESSURE: 80 MMHG | WEIGHT: 281.5 LBS | TEMPERATURE: 97.2 F | RESPIRATION RATE: 16 BRPM | HEART RATE: 64 BPM | SYSTOLIC BLOOD PRESSURE: 136 MMHG | BODY MASS INDEX: 49.87 KG/M2

## 2023-04-06 DIAGNOSIS — E78.5 HYPERLIPIDEMIA, UNSPECIFIED HYPERLIPIDEMIA TYPE: ICD-10-CM

## 2023-04-06 DIAGNOSIS — K90.9 IRON MALABSORPTION: ICD-10-CM

## 2023-04-06 DIAGNOSIS — Z98.84 S/P LAPAROSCOPIC SLEEVE GASTRECTOMY: ICD-10-CM

## 2023-04-06 DIAGNOSIS — R73.03 PRE-DIABETES: ICD-10-CM

## 2023-04-06 DIAGNOSIS — E55.9 VITAMIN D DEFICIENCY: Primary | ICD-10-CM

## 2023-04-06 DIAGNOSIS — D50.8 IRON DEFICIENCY ANEMIA SECONDARY TO INADEQUATE DIETARY IRON INTAKE: Primary | ICD-10-CM

## 2023-04-06 DIAGNOSIS — E66.01 OBESITY, CLASS III, BMI 40-49.9 (MORBID OBESITY) (HCC): ICD-10-CM

## 2023-04-06 DIAGNOSIS — I67.5 MOYA MOYA DISEASE: ICD-10-CM

## 2023-04-06 DIAGNOSIS — D50.9 MICROCYTIC ANEMIA: ICD-10-CM

## 2023-04-06 LAB
25(OH)D3 SERPL-MCNC: 28 NG/ML
ABSOLUTE EOS #: 0.1 K/UL (ref 0–0.4)
ABSOLUTE LYMPH #: 1.9 K/UL (ref 1–4.8)
ABSOLUTE MONO #: 0.5 K/UL (ref 0.1–1.3)
ALBUMIN SERPL-MCNC: 3.9 G/DL (ref 3.5–5.2)
ALP SERPL-CCNC: 82 U/L (ref 35–104)
ALT SERPL-CCNC: 18 U/L (ref 5–33)
ANION GAP SERPL CALCULATED.3IONS-SCNC: 8 MMOL/L (ref 9–17)
AST SERPL-CCNC: 16 U/L
BASOPHILS # BLD: 1 % (ref 0–2)
BASOPHILS ABSOLUTE: 0 K/UL (ref 0–0.2)
BILIRUB SERPL-MCNC: 0.3 MG/DL (ref 0.3–1.2)
BUN SERPL-MCNC: 12 MG/DL (ref 6–20)
CALCIUM SERPL-MCNC: 9 MG/DL (ref 8.6–10.4)
CHLORIDE SERPL-SCNC: 104 MMOL/L (ref 98–107)
CHOLEST SERPL-MCNC: 194 MG/DL
CHOLESTEROL/HDL RATIO: 2.7
CO2 SERPL-SCNC: 29 MMOL/L (ref 20–31)
CREAT SERPL-MCNC: 0.59 MG/DL (ref 0.5–0.9)
EOSINOPHILS RELATIVE PERCENT: 2 % (ref 0–4)
EST. AVERAGE GLUCOSE BLD GHB EST-MCNC: 111 MG/DL
FERRITIN SERPL-MCNC: 135 NG/ML (ref 13–150)
FOLATE SERPL-MCNC: 5 NG/ML
GFR SERPL CREATININE-BSD FRML MDRD: >60 ML/MIN/1.73M2
GLUCOSE SERPL-MCNC: 90 MG/DL (ref 70–99)
HBA1C MFR BLD: 5.5 % (ref 4–6)
HCT VFR BLD AUTO: 35 % (ref 36–46)
HDLC SERPL-MCNC: 72 MG/DL
HGB BLD-MCNC: 11.5 G/DL (ref 12–16)
IRON SATURATION: 34 % (ref 20–55)
IRON SERPL-MCNC: 95 UG/DL (ref 37–145)
LDLC SERPL CALC-MCNC: 111 MG/DL (ref 0–130)
LYMPHOCYTES # BLD: 32 % (ref 24–44)
MAGNESIUM SERPL-MCNC: 1.9 MG/DL (ref 1.6–2.6)
MCH RBC QN AUTO: 26 PG (ref 26–34)
MCHC RBC AUTO-ENTMCNC: 32.9 G/DL (ref 31–37)
MCV RBC AUTO: 79 FL (ref 80–100)
MONOCYTES # BLD: 9 % (ref 1–7)
PDW BLD-RTO: 20 % (ref 11.5–14.9)
PLATELET # BLD AUTO: 320 K/UL (ref 150–450)
PMV BLD AUTO: 7.8 FL (ref 6–12)
POTASSIUM SERPL-SCNC: 4.2 MMOL/L (ref 3.7–5.3)
PROT SERPL-MCNC: 6.8 G/DL (ref 6.4–8.3)
PTH-INTACT SERPL-MCNC: 33.3 PG/ML (ref 14–72)
RBC # BLD: 4.43 M/UL (ref 4–5.2)
SEG NEUTROPHILS: 56 % (ref 36–66)
SEGMENTED NEUTROPHILS ABSOLUTE COUNT: 3.3 K/UL (ref 1.3–9.1)
SODIUM SERPL-SCNC: 141 MMOL/L (ref 135–144)
T4 FREE SERPL-MCNC: 1.06 NG/DL (ref 0.93–1.7)
TIBC SERPL-MCNC: 276 UG/DL (ref 250–450)
TRIGL SERPL-MCNC: 56 MG/DL
TSH SERPL-ACNC: 2.29 UIU/ML (ref 0.3–5)
UNSATURATED IRON BINDING CAPACITY: 181 UG/DL (ref 112–347)
VIT B12 SERPL-MCNC: 479 PG/ML (ref 232–1245)
WBC # BLD AUTO: 5.9 K/UL (ref 3.5–11)

## 2023-04-06 PROCEDURE — 82746 ASSAY OF FOLIC ACID SERUM: CPT

## 2023-04-06 PROCEDURE — 36415 COLL VENOUS BLD VENIPUNCTURE: CPT

## 2023-04-06 PROCEDURE — 82607 VITAMIN B-12: CPT

## 2023-04-06 PROCEDURE — 82306 VITAMIN D 25 HYDROXY: CPT

## 2023-04-06 PROCEDURE — 99214 OFFICE O/P EST MOD 30 MIN: CPT | Performed by: INTERNAL MEDICINE

## 2023-04-06 PROCEDURE — 80053 COMPREHEN METABOLIC PANEL: CPT

## 2023-04-06 PROCEDURE — 83036 HEMOGLOBIN GLYCOSYLATED A1C: CPT

## 2023-04-06 PROCEDURE — 83970 ASSAY OF PARATHORMONE: CPT

## 2023-04-06 PROCEDURE — 84443 ASSAY THYROID STIM HORMONE: CPT

## 2023-04-06 PROCEDURE — 84425 ASSAY OF VITAMIN B-1: CPT

## 2023-04-06 PROCEDURE — 83540 ASSAY OF IRON: CPT

## 2023-04-06 PROCEDURE — 83550 IRON BINDING TEST: CPT

## 2023-04-06 PROCEDURE — 84439 ASSAY OF FREE THYROXINE: CPT

## 2023-04-06 PROCEDURE — 85025 COMPLETE CBC W/AUTO DIFF WBC: CPT

## 2023-04-06 PROCEDURE — 83735 ASSAY OF MAGNESIUM: CPT

## 2023-04-06 PROCEDURE — 3075F SYST BP GE 130 - 139MM HG: CPT | Performed by: INTERNAL MEDICINE

## 2023-04-06 PROCEDURE — 84590 ASSAY OF VITAMIN A: CPT

## 2023-04-06 PROCEDURE — 80061 LIPID PANEL: CPT

## 2023-04-06 PROCEDURE — 84630 ASSAY OF ZINC: CPT

## 2023-04-06 PROCEDURE — 82728 ASSAY OF FERRITIN: CPT

## 2023-04-06 PROCEDURE — 3079F DIAST BP 80-89 MM HG: CPT | Performed by: INTERNAL MEDICINE

## 2023-04-06 PROCEDURE — 99211 OFF/OP EST MAY X REQ PHY/QHP: CPT

## 2023-04-06 RX ORDER — ERGOCALCIFEROL 1.25 MG/1
50000 CAPSULE ORAL WEEKLY
Qty: 8 CAPSULE | Refills: 0 | Status: SHIPPED | OUTPATIENT
Start: 2023-04-06 | End: 2023-05-26

## 2023-04-06 NOTE — PROGRESS NOTES
0.59 04/06/2023 0725        Component Value Date/Time    CALCIUM 9.0 04/06/2023 0725    ALKPHOS 82 04/06/2023 0725    AST 16 04/06/2023 0725    ALT 18 04/06/2023 0725    BILITOT 0.3 04/06/2023 0725          [unfilled]      IMPRESSION:   Microcytic anemia  Iron deficiency anemia  Lack of response to oral iron  Fibroid uterus  Probable iron malabsorption    PLAN: Records, labs and images were reviewed and discussed with the patient. I explained to the patient the nature of his problem with anemia and underlying cause. Obviously she had significant bleeding secondary to fibroid but also she had with absorption and lack of response to oral iron. So patient was treated with IV iron infusion. She had very good response. I explained the results of the recent repeated labs. We recommend continued monitoring. We will repeat the labs in about 6 months including iron studies. Sooner for any problems. Patient will continue to follow-up with her OB/GYN for further management of fibroid uterus. Possible need for hysterectomy. Patient's questions were answered to the best of her satisfaction and she verbalized full understanding and agreement.

## 2023-04-06 NOTE — TELEPHONE ENCOUNTER
Umang Can MD VISIT  RV 6 months with CBC, iron studies before RV  LABS CDP FERRITIN FE TIBC ORDERS GIVEN TO PT ON EXIT TO BE DONE 9/28/23  MD VISIT 10/5/23 @ 1PM  AVS PRINTED W/ INSTRUCTIONS AND GIVEN TO PT ON EXIT

## 2023-04-09 LAB — ZINC: 92.2 UG/DL (ref 60–120)

## 2023-07-10 ENCOUNTER — OFFICE VISIT (OUTPATIENT)
Dept: FAMILY MEDICINE CLINIC | Age: 38
End: 2023-07-10
Payer: MEDICAID

## 2023-07-10 VITALS
HEIGHT: 63 IN | OXYGEN SATURATION: 98 % | SYSTOLIC BLOOD PRESSURE: 140 MMHG | HEART RATE: 86 BPM | BODY MASS INDEX: 47.66 KG/M2 | DIASTOLIC BLOOD PRESSURE: 89 MMHG | WEIGHT: 269 LBS

## 2023-07-10 DIAGNOSIS — E66.01 CLASS 3 SEVERE OBESITY DUE TO EXCESS CALORIES WITHOUT SERIOUS COMORBIDITY WITH BODY MASS INDEX (BMI) OF 60.0 TO 69.9 IN ADULT (HCC): ICD-10-CM

## 2023-07-10 DIAGNOSIS — I10 ESSENTIAL HYPERTENSION: Primary | ICD-10-CM

## 2023-07-10 PROCEDURE — 3077F SYST BP >= 140 MM HG: CPT | Performed by: FAMILY MEDICINE

## 2023-07-10 PROCEDURE — 99213 OFFICE O/P EST LOW 20 MIN: CPT | Performed by: FAMILY MEDICINE

## 2023-07-10 PROCEDURE — 3079F DIAST BP 80-89 MM HG: CPT | Performed by: FAMILY MEDICINE

## 2023-07-10 RX ORDER — PHENTERMINE HYDROCHLORIDE 37.5 MG/1
37.5 TABLET ORAL
Qty: 30 TABLET | Refills: 0 | Status: SHIPPED | OUTPATIENT
Start: 2023-07-10 | End: 2023-08-09

## 2023-07-10 SDOH — ECONOMIC STABILITY: FOOD INSECURITY: WITHIN THE PAST 12 MONTHS, YOU WORRIED THAT YOUR FOOD WOULD RUN OUT BEFORE YOU GOT MONEY TO BUY MORE.: NEVER TRUE

## 2023-07-10 SDOH — ECONOMIC STABILITY: FOOD INSECURITY: WITHIN THE PAST 12 MONTHS, THE FOOD YOU BOUGHT JUST DIDN'T LAST AND YOU DIDN'T HAVE MONEY TO GET MORE.: NEVER TRUE

## 2023-07-10 SDOH — ECONOMIC STABILITY: INCOME INSECURITY: HOW HARD IS IT FOR YOU TO PAY FOR THE VERY BASICS LIKE FOOD, HOUSING, MEDICAL CARE, AND HEATING?: NOT HARD AT ALL

## 2023-07-10 SDOH — ECONOMIC STABILITY: HOUSING INSECURITY
IN THE LAST 12 MONTHS, WAS THERE A TIME WHEN YOU DID NOT HAVE A STEADY PLACE TO SLEEP OR SLEPT IN A SHELTER (INCLUDING NOW)?: NO

## 2023-07-10 ASSESSMENT — PATIENT HEALTH QUESTIONNAIRE - PHQ9
SUM OF ALL RESPONSES TO PHQ QUESTIONS 1-9: 0
SUM OF ALL RESPONSES TO PHQ9 QUESTIONS 1 & 2: 0
2. FEELING DOWN, DEPRESSED OR HOPELESS: 0
SUM OF ALL RESPONSES TO PHQ QUESTIONS 1-9: 0
1. LITTLE INTEREST OR PLEASURE IN DOING THINGS: 0
SUM OF ALL RESPONSES TO PHQ QUESTIONS 1-9: 0
SUM OF ALL RESPONSES TO PHQ QUESTIONS 1-9: 0

## 2023-07-10 NOTE — PROGRESS NOTES
Constitutional: She is oriented to person, place, and time. She   appears well-developed and well-nourished. HENT:   Head: Normocephalic and atraumatic. Right Ear: External ear normal. Tympanic membrane is not erythematous. No middle ear effusion. Left Ear: External ear normal. Tympanic membrane is not erythematous. No middle ear effusion. Nose: No mucosal edema. Mouth/Throat: Oropharynx is clear and moist. No posterior oropharyngeal erythema. Eyes: Conjunctivae and EOM are normal. Pupils are equal, round, and reactive to light. Neck: Normal range of motion. Neck supple. No thyromegaly present. Cardiovascular: Normal rate, regular rhythm and normal heart sounds. No murmur heard. Pulmonary/Chest: Effort normal and breath sounds normal. She has no wheezes. Shehas no rales. Abdominal: Soft. Bowel sounds are normal. She exhibits no distension and no mass. There is no tenderness. There is no rebound and no guarding. Genitourinary/Anorectal:deferred  Musculoskeletal: Normal range of motion. She exhibits no edema or tenderness. Lymphadenopathy: She has no cervical adenopathy. Neurological: She is alert and oriented to person, place, and time. She has normal reflexes. Skin: Skin is warm and dry. No rash noted. Psychiatric: She has a normal mood and affect. Her   behavior is normal.       Assessment:      1. Essential hypertension    2. Class 3 severe obesity due to excess calories without serious comorbidity with body mass index (BMI) of 60.0 to 69.9 in adult [facility])          Plan:      Call or return to clinic prn if these symptoms worsen or fail to improve as anticipated. I have reviewed the instructions with the patient, answering all questions to her satisfaction. No follow-ups on file. No orders of the defined types were placed in this encounter.     Orders Placed This Encounter   Medications    phentermine (ADIPEX-P) 37.5 MG tablet     Sig: Take 1 tablet by mouth every morning

## 2023-11-12 DIAGNOSIS — E66.01 MORBID OBESITY WITH BMI OF 50.0-59.9, ADULT (HCC): ICD-10-CM

## 2023-11-12 DIAGNOSIS — E78.5 HYPERLIPIDEMIA, UNSPECIFIED HYPERLIPIDEMIA TYPE: ICD-10-CM

## 2023-11-12 DIAGNOSIS — Z98.84 S/P LAPAROSCOPIC SLEEVE GASTRECTOMY: ICD-10-CM

## 2023-11-12 DIAGNOSIS — R73.03 PRE-DIABETES: ICD-10-CM

## 2023-11-13 RX ORDER — PANTOPRAZOLE SODIUM 40 MG/1
40 TABLET, DELAYED RELEASE ORAL DAILY
Qty: 90 TABLET | Refills: 3 | Status: SHIPPED | OUTPATIENT
Start: 2023-11-13

## 2025-03-13 ENCOUNTER — TELEPHONE (OUTPATIENT)
Dept: BARIATRICS/WEIGHT MGMT | Age: 40
End: 2025-03-13

## (undated) DEVICE — SUTURE ABSRB BRAID COAT VLT SH 3-0 27IN VCRL J311H

## (undated) DEVICE — SUTURE ETHBND EXCEL SZ 0 L30IN NONABSORBABLE GRN L26MM CT-2 X412H

## (undated) DEVICE — SUTURE MCRYL SZ 4-0 L18IN ABSRB UD L16MM PC-3 3/8 CIR PRIM Y845G

## (undated) DEVICE — SIMPLICITY FLUFF UNDERPAD 23X36, MODERATE: Brand: SIMPLICITY

## (undated) DEVICE — VESSEL SEALER EXTEND: Brand: ENDOWRIST

## (undated) DEVICE — YANKAUER,FLEXIBLE HANDLE,REGLR CAPACITY: Brand: MEDLINE INDUSTRIES, INC.

## (undated) DEVICE — ARM DRAPE

## (undated) DEVICE — 4-PORT MANIFOLD: Brand: NEPTUNE 2

## (undated) DEVICE — GOWN,POLY REINFORCED,LG: Brand: MEDLINE

## (undated) DEVICE — CANNULA SEAL

## (undated) DEVICE — APPLICATOR MEDICATED 26 CC SOLUTION HI LT ORNG CHLORAPREP

## (undated) DEVICE — BITEBLOCK 54FR W/ DENT RIM BLOX

## (undated) DEVICE — GLOVE SURG SZ 6 THK91MIL LTX FREE SYN POLYISOPRENE ANTI

## (undated) DEVICE — GLOVE ORANGE PI 7 1/2   MSG9075

## (undated) DEVICE — Device

## (undated) DEVICE — COVER LT HNDL BLU PLAS

## (undated) DEVICE — BASIN EMSIS 700ML GRAPHITE PLAS KID SHP GRAD

## (undated) DEVICE — FORCEPS BX L240CM JAW DIA2.8MM L CAP W/ NDL MIC MESH TOOTH

## (undated) DEVICE — SUTURE VIC + SH-13-0 27IN  VCP311H

## (undated) DEVICE — BLADELESS OBTURATOR: Brand: WECK VISTA

## (undated) DEVICE — SOLUTION ANTIFOG VIS SYS CLEARIFY LAPSCP

## (undated) DEVICE — TROCAR: Brand: KII FIOS FIRST ENTRY

## (undated) DEVICE — STAPLER 60: Brand: SUREFORM

## (undated) DEVICE — JELLY,LUBE,STERILE,FLIP TOP,TUBE,2-OZ: Brand: MEDLINE

## (undated) DEVICE — ELECTRO LUBE IS A SINGLE PATIENT USE DEVICE THAT IS INTENDED TO BE USED ON ELECTROSURGICAL ELECTRODES TO REDUCE STICKING.: Brand: KEY SURGICAL ELECTRO LUBE

## (undated) DEVICE — GLOVE ORANGE PI 7   MSG9070

## (undated) DEVICE — COUNTER NDL 10 COUNT HLD 20 FOAM BLK SGL MAG

## (undated) DEVICE — ADAPTER,CATHETER/SYRINGE/LUER,STERILE: Brand: MEDLINE

## (undated) DEVICE — SUTURE ETHLN SZ 3-0 L18IN NONABSORBABLE BLK L24MM PS-1 3/8 1663G

## (undated) DEVICE — CONNECTOR TBNG AUX H2O JET DISP FOR OLY 160/180 SER

## (undated) DEVICE — GLOVE SURG SZ 65 THK91MIL LTX FREE SYN POLYISOPRENE

## (undated) DEVICE — SUTURE NONABSORBABLE MONOFILAMENT 3-0 PS-1 18 IN BLK ETHILON 1663H

## (undated) DEVICE — BINDER ABD 3XL H9XL71 82IN E UNISX 3 PNL

## (undated) DEVICE — Device: Brand: DEFENDO VALVE AND CONNECTOR KIT

## (undated) DEVICE — ADHESIVE SKIN CLOSURE TOP 36 CC HI VISC DERMBND MINI

## (undated) DEVICE — SEAL

## (undated) DEVICE — VISIGI 3D®  CALIBRATION SYSTEM  SIZE 36FR STD W/ BULB: Brand: BOEHRINGER® VISIGI 3D™ SLEEVE GASTRECTOMY CALIBRATION SYSTEM, SIZE 36FR W/BULB

## (undated) DEVICE — STAPLER 60 RELOAD BLUE: Brand: SUREFORM

## (undated) DEVICE — GOWN,AURORA,NONREINFORCED,LARGE: Brand: MEDLINE

## (undated) DEVICE — STAPLER 60 RELOAD GREEN: Brand: SUREFORM

## (undated) DEVICE — CANNULA IV 18GA L15IN BLNT FILL LUERLOCK HUB MJCT

## (undated) DEVICE — SUTURE SZ 0 27IN 5/8 CIR UR-6  TAPER PT VIOLET ABSRB VICRYL J603H

## (undated) DEVICE — TUBING, SUCTION, 3/16" X 10', STRAIGHT: Brand: MEDLINE

## (undated) DEVICE — REDUCER: Brand: ENDOWRIST

## (undated) DEVICE — TOWEL,OR,DSP,ST,NATURAL,DLX,4/PK,20PK/CS: Brand: MEDLINE

## (undated) DEVICE — PLUMEPORT SEO LAPAROSCOPIC SMOKE FILTRATION DEVICE: Brand: PLUMEPORT

## (undated) DEVICE — INSUFFLATION TUBING SET WITH FILTER, FUNNEL CONNECTOR AND LUER LOCK: Brand: JOSNOE MEDICAL INC